# Patient Record
Sex: MALE | NOT HISPANIC OR LATINO | Employment: FULL TIME | URBAN - METROPOLITAN AREA
[De-identification: names, ages, dates, MRNs, and addresses within clinical notes are randomized per-mention and may not be internally consistent; named-entity substitution may affect disease eponyms.]

---

## 2020-01-22 ENCOUNTER — CONSULT (OUTPATIENT)
Dept: CARDIOLOGY | Facility: CLINIC | Age: 55
End: 2020-01-22

## 2020-01-22 VITALS
DIASTOLIC BLOOD PRESSURE: 83 MMHG | HEIGHT: 68 IN | SYSTOLIC BLOOD PRESSURE: 126 MMHG | HEART RATE: 89 BPM | BODY MASS INDEX: 36.37 KG/M2 | WEIGHT: 240 LBS

## 2020-01-22 DIAGNOSIS — R93.1 ABNORMAL ECHOCARDIOGRAM: Primary | ICD-10-CM

## 2020-01-22 DIAGNOSIS — I10 ESSENTIAL HYPERTENSION: ICD-10-CM

## 2020-01-22 DIAGNOSIS — R07.2 PRECORDIAL PAIN: ICD-10-CM

## 2020-01-22 DIAGNOSIS — I42.8 OTHER CARDIOMYOPATHY (HCC): ICD-10-CM

## 2020-01-22 DIAGNOSIS — R00.2 PALPITATIONS: ICD-10-CM

## 2020-01-22 PROCEDURE — 99204 OFFICE O/P NEW MOD 45 MIN: CPT | Performed by: INTERNAL MEDICINE

## 2020-01-22 PROCEDURE — 93000 ELECTROCARDIOGRAM COMPLETE: CPT | Performed by: INTERNAL MEDICINE

## 2020-01-22 RX ORDER — VARENICLINE TARTRATE 1 MG/1
1 TABLET, FILM COATED ORAL 2 TIMES DAILY
COMMUNITY

## 2020-01-22 RX ORDER — METOPROLOL SUCCINATE 50 MG/1
50 TABLET, EXTENDED RELEASE ORAL DAILY
COMMUNITY
End: 2020-02-11 | Stop reason: HOSPADM

## 2020-01-22 NOTE — PROGRESS NOTES
Subjective:     Encounter Date:01/22/2020    Primary Care Physician: Saul Cowan MD      Patient ID: Ross Quintero is a 54 y.o. male.    Chief Complaint:Shortness of Breath (consult)    PROBLEM LIST:  1. Cardiomyopathy/systolic congestive heart failure  a. 1/15/2020 echocardiogram EF 15%.  Mild to moderate mitral regurgitation.  RVSP 45 mmHg.  Biatrial enlargement.  Dilated right ventricle with decreased right ventricular systolic function.  2. Hypertension  3. Dyslipidemia  4. ETOH abuse  5. Tobacco abuse  6. Surgeries:  a. Lasik surgery  b. Vasectomy         No Known Allergies      Current Outpatient Medications:   •  metoprolol succinate XL (TOPROL-XL) 50 MG 24 hr tablet, Take 50 mg by mouth Daily., Disp: , Rfl:   •  varenicline (CHANTIX) 1 MG tablet, Take 1 mg by mouth 2 (Two) Times a Day., Disp: , Rfl:         History of Present Illness    Patient is a 54-year-old  male who we are seeing today for further evaluation of heart failure and abnormal echocardiogram with decreased LVEF of 15%.  He has no previous history of coronary disease.  Patient notes within the last month he has had increased shortness of breath.  Within the last week or so he began to experience waking up short of breath and noted orthopnea.  He also has some occasional chest discomfort which he just describes as an odd sensation.  Describes some intermittent palpitations.  Denies any syncope, or edema.  Started Chantix recently and is a currently attempting smoking cessation.  Has previous history of EtOH abuse but notes no significant intake since November per his report.  Chest discomfort with exertion walking in from parking lot to work    The following portions of the patient's history were reviewed and updated as appropriate: allergies, current medications, past family history, past medical history, past social history, past surgical history and problem list.    Family History   Adopted: Yes       Social History  "    Tobacco Use   • Smoking status: Current Every Day Smoker     Packs/day: 1.00     Years: 30.00     Pack years: 30.00   Substance Use Topics   • Alcohol use: Not Currently   • Drug use: Never         Review of Systems   Constitution: Negative for fever and malaise/fatigue.   HENT: Negative for nosebleeds.    Eyes: Negative for redness and visual disturbance.   Cardiovascular: Positive for chest pain, orthopnea and palpitations. Negative for paroxysmal nocturnal dyspnea.   Respiratory: Positive for cough and shortness of breath. Negative for snoring, sputum production and wheezing.    Hematologic/Lymphatic: Negative for bleeding problem.   Skin: Negative for flushing, itching and rash.   Musculoskeletal: Negative for falls, joint pain and muscle cramps.   Gastrointestinal: Positive for heartburn. Negative for abdominal pain, diarrhea, nausea and vomiting.   Genitourinary: Negative for hematuria.   Neurological: Positive for excessive daytime sleepiness and headaches. Negative for dizziness, tremors and weakness.   Psychiatric/Behavioral: Negative for substance abuse. The patient is not nervous/anxious.           Objective:   /83 (BP Location: Right arm, Patient Position: Sitting)   Pulse 89   Ht 172.7 cm (68\")   Wt 109 kg (240 lb)   BMI 36.49 kg/m²         Physical Exam   Constitutional: He is oriented to person, place, and time. He appears well-developed and well-nourished.   HENT:   Head: Normocephalic and atraumatic.   Mouth/Throat: Oropharynx is clear and moist.   Eyes: Pupils are equal, round, and reactive to light. Conjunctivae are normal.   Neck: Normal carotid pulses and no JVD present. Carotid bruit is not present. No thyromegaly present.   Cardiovascular: Normal rate, regular rhythm, S1 normal and S2 normal. Exam reveals no gallop and no friction rub.   No murmur heard.  Pulses:       Carotid pulses are 2+ on the right side, and 2+ on the left side.       Dorsalis pedis pulses are 2+ on the " right side, and 2+ on the left side.        Posterior tibial pulses are 2+ on the right side, and 2+ on the left side.   Pulmonary/Chest: No respiratory distress. He has no wheezes. He has no rales. He exhibits no tenderness.   Abdominal: He exhibits no distension, no abdominal bruit and no mass. There is no hepatosplenomegaly. There is no tenderness. There is no rebound.   Musculoskeletal: He exhibits no edema, tenderness or deformity.   Lymphadenopathy:     He has no cervical adenopathy.   Neurological: He is alert and oriented to person, place, and time. He has normal strength.   Skin: Skin is warm and dry. No rash noted. No cyanosis. Nails show no clubbing.   Psychiatric: He has a normal mood and affect. Cognition and memory are normal.         ECG 12 Lead  Date/Time: 1/22/2020 3:13 PM  Performed by: Darinel Martinez MD  Authorized by: Darinel Martinez MD   Comparison: compared with previous ECG from 1/13/2020  Similar to previous ECG  Comparison to previous ECG: Possible left atrial enlargement noted. Otherwise no significant change noted  Rhythm: sinus rhythm  Ectopy: unifocal PVCs  Other findings: non-specific ST-T wave changes    Clinical impression: abnormal EKG                  Assessment:   Assessment/Plan      Ross was seen today for shortness of breath.    Diagnoses and all orders for this visit:    Abnormal echocardiogram  -     Case Request Cath Lab: Left Heart Cath  -     ECG 12 Lead    Other cardiomyopathy (CMS/HCC)    Precordial pain    Essential hypertension    Palpitations      1.  Cardiomyopathy, with functional class III heart failure symptoms.  New onset.  2.  Exertional chest discomfort possible angina  3.  Hypertension currently controlled  4.  Dyslipidemia last LDL of 154  5.  Tobacco abuse, patient in the process of quitting.  Start Chantix next week with target date of quitting next week.  6.  Palpitations consistent with PVCs, as noted on EKG.    Recommendations 1.  Long discussion  regarding cardiomyopathy and need to institute medical therapy to improve LVEF.  2.  We will start Entresto 24/26 at this time.  3.  Has malaise fatigue with Toprol.  Once we have stabilized his medical regimen we can switch him to Coreg.  4.  Left heart catheterization as soon as possible to rule out ischemia given his chest discomfort multiple cardiac risk factors.  5.  May institute statin at the time of cath pending the results.  Also after evaluation of renal failure, we may start diuretics.       Radha EDWARDS scribed portions of this dictation for Dr. Darinel Martinez.    Dictated utilizing Dragon dictation

## 2020-01-23 PROBLEM — R93.1 ABNORMAL ECHOCARDIOGRAM: Status: ACTIVE | Noted: 2020-01-23

## 2020-01-30 ENCOUNTER — HOSPITAL ENCOUNTER (OUTPATIENT)
Facility: HOSPITAL | Age: 55
Setting detail: HOSPITAL OUTPATIENT SURGERY
Discharge: HOME | End: 2020-01-30
Attending: INTERNAL MEDICINE | Admitting: INTERNAL MEDICINE

## 2020-01-30 VITALS
SYSTOLIC BLOOD PRESSURE: 101 MMHG | WEIGHT: 236.25 LBS | DIASTOLIC BLOOD PRESSURE: 63 MMHG | TEMPERATURE: 96.8 F | RESPIRATION RATE: 16 BRPM | HEART RATE: 71 BPM | BODY MASS INDEX: 35.8 KG/M2 | OXYGEN SATURATION: 94 % | HEIGHT: 68 IN

## 2020-01-30 DIAGNOSIS — R93.1 ABNORMAL ECHOCARDIOGRAM: ICD-10-CM

## 2020-01-30 LAB
ALBUMIN SERPL-MCNC: 4.2 G/DL (ref 3.5–5.2)
ALBUMIN/GLOB SERPL: 1.1 G/DL
ALP SERPL-CCNC: 83 U/L (ref 39–117)
ALT SERPL W P-5'-P-CCNC: 24 U/L (ref 1–41)
ANION GAP SERPL CALCULATED.3IONS-SCNC: 13 MMOL/L (ref 5–15)
AST SERPL-CCNC: 23 U/L (ref 1–40)
BILIRUB SERPL-MCNC: 0.8 MG/DL (ref 0.2–1.2)
BUN BLD-MCNC: 16 MG/DL (ref 6–20)
BUN/CREAT SERPL: 18 (ref 7–25)
CALCIUM SPEC-SCNC: 10.1 MG/DL (ref 8.6–10.5)
CHLORIDE SERPL-SCNC: 102 MMOL/L (ref 98–107)
CHOLEST SERPL-MCNC: 210 MG/DL (ref 0–200)
CO2 SERPL-SCNC: 24 MMOL/L (ref 22–29)
CREAT BLD-MCNC: 0.89 MG/DL (ref 0.76–1.27)
DEPRECATED RDW RBC AUTO: 52.2 FL (ref 37–54)
ERYTHROCYTE [DISTWIDTH] IN BLOOD BY AUTOMATED COUNT: 14.6 % (ref 12.3–15.4)
GFR SERPL CREATININE-BSD FRML MDRD: 109 ML/MIN/1.73
GFR SERPL CREATININE-BSD FRML MDRD: 90 ML/MIN/1.73
GLOBULIN UR ELPH-MCNC: 3.9 GM/DL
GLUCOSE BLD-MCNC: 113 MG/DL (ref 65–99)
HBA1C MFR BLD: 6.4 % (ref 4.8–5.6)
HCT VFR BLD AUTO: 48.7 % (ref 37.5–51)
HDLC SERPL-MCNC: 42 MG/DL (ref 40–60)
HGB BLD-MCNC: 15.9 G/DL (ref 13–17.7)
LDLC SERPL CALC-MCNC: 149 MG/DL (ref 0–100)
LDLC/HDLC SERPL: 3.54 {RATIO}
MCH RBC QN AUTO: 31.2 PG (ref 26.6–33)
MCHC RBC AUTO-ENTMCNC: 32.6 G/DL (ref 31.5–35.7)
MCV RBC AUTO: 95.5 FL (ref 79–97)
PLATELET # BLD AUTO: 235 10*3/MM3 (ref 140–450)
PMV BLD AUTO: 11.1 FL (ref 6–12)
POTASSIUM BLD-SCNC: 4.5 MMOL/L (ref 3.5–5.2)
PROT SERPL-MCNC: 8.1 G/DL (ref 6–8.5)
RBC # BLD AUTO: 5.1 10*6/MM3 (ref 4.14–5.8)
SODIUM BLD-SCNC: 139 MMOL/L (ref 136–145)
TRIGL SERPL-MCNC: 96 MG/DL (ref 0–150)
VLDLC SERPL-MCNC: 19.2 MG/DL
WBC NRBC COR # BLD: 7.09 10*3/MM3 (ref 3.4–10.8)

## 2020-01-30 PROCEDURE — C1887 CATHETER, GUIDING: HCPCS | Performed by: INTERNAL MEDICINE

## 2020-01-30 PROCEDURE — 80061 LIPID PANEL: CPT | Performed by: NURSE PRACTITIONER

## 2020-01-30 PROCEDURE — C1769 GUIDE WIRE: HCPCS | Performed by: INTERNAL MEDICINE

## 2020-01-30 PROCEDURE — 25010000002 BIVALIRUDIN TRIFLUOROACETATE 250 MG RECONSTITUTED SOLUTION 1 EACH VIAL: Performed by: INTERNAL MEDICINE

## 2020-01-30 PROCEDURE — 80053 COMPREHEN METABOLIC PANEL: CPT | Performed by: NURSE PRACTITIONER

## 2020-01-30 PROCEDURE — 99244 OFF/OP CNSLTJ NEW/EST MOD 40: CPT | Performed by: THORACIC SURGERY (CARDIOTHORACIC VASCULAR SURGERY)

## 2020-01-30 PROCEDURE — 93458 L HRT ARTERY/VENTRICLE ANGIO: CPT | Performed by: INTERNAL MEDICINE

## 2020-01-30 PROCEDURE — 25010000002 HEPARIN (PORCINE) PER 1000 UNITS: Performed by: INTERNAL MEDICINE

## 2020-01-30 PROCEDURE — 25010000002 MIDAZOLAM PER 1 MG: Performed by: INTERNAL MEDICINE

## 2020-01-30 PROCEDURE — 93571 IV DOP VEL&/PRESS C FLO 1ST: CPT | Performed by: INTERNAL MEDICINE

## 2020-01-30 PROCEDURE — C1894 INTRO/SHEATH, NON-LASER: HCPCS | Performed by: INTERNAL MEDICINE

## 2020-01-30 PROCEDURE — 93572 IV DOP VEL&/PRESS C FLO EA: CPT | Performed by: INTERNAL MEDICINE

## 2020-01-30 PROCEDURE — 83036 HEMOGLOBIN GLYCOSYLATED A1C: CPT | Performed by: NURSE PRACTITIONER

## 2020-01-30 PROCEDURE — 85027 COMPLETE CBC AUTOMATED: CPT | Performed by: NURSE PRACTITIONER

## 2020-01-30 PROCEDURE — 36415 COLL VENOUS BLD VENIPUNCTURE: CPT

## 2020-01-30 PROCEDURE — 0 IOPAMIDOL PER 1 ML: Performed by: INTERNAL MEDICINE

## 2020-01-30 PROCEDURE — 25010000002 FENTANYL CITRATE (PF) 100 MCG/2ML SOLUTION: Performed by: INTERNAL MEDICINE

## 2020-01-30 RX ORDER — ACETAMINOPHEN 325 MG/1
650 TABLET ORAL EVERY 4 HOURS PRN
Status: DISCONTINUED | OUTPATIENT
Start: 2020-01-30 | End: 2020-01-30 | Stop reason: HOSPADM

## 2020-01-30 RX ORDER — ATORVASTATIN CALCIUM 40 MG/1
40 TABLET, FILM COATED ORAL DAILY
Qty: 30 TABLET | Refills: 11 | Status: SHIPPED | OUTPATIENT
Start: 2020-01-30 | End: 2020-04-07 | Stop reason: SDUPTHER

## 2020-01-30 RX ORDER — MIDAZOLAM HYDROCHLORIDE 1 MG/ML
INJECTION INTRAMUSCULAR; INTRAVENOUS AS NEEDED
Status: DISCONTINUED | OUTPATIENT
Start: 2020-01-30 | End: 2020-01-30 | Stop reason: HOSPADM

## 2020-01-30 RX ORDER — SODIUM CHLORIDE 9 MG/ML
100 INJECTION, SOLUTION INTRAVENOUS CONTINUOUS
Status: ACTIVE | OUTPATIENT
Start: 2020-01-30 | End: 2020-01-30

## 2020-01-30 RX ORDER — SODIUM CHLORIDE 0.9 % (FLUSH) 0.9 %
3 SYRINGE (ML) INJECTION EVERY 12 HOURS SCHEDULED
Status: DISCONTINUED | OUTPATIENT
Start: 2020-01-30 | End: 2020-01-30 | Stop reason: HOSPADM

## 2020-01-30 RX ORDER — ASPIRIN 325 MG
325 TABLET, DELAYED RELEASE (ENTERIC COATED) ORAL DAILY
Status: DISCONTINUED | OUTPATIENT
Start: 2020-01-31 | End: 2020-01-30 | Stop reason: HOSPADM

## 2020-01-30 RX ORDER — FEXOFENADINE HCL 180 MG/1
180 TABLET ORAL DAILY
COMMUNITY
End: 2020-03-03

## 2020-01-30 RX ORDER — LIDOCAINE HYDROCHLORIDE 10 MG/ML
INJECTION, SOLUTION EPIDURAL; INFILTRATION; INTRACAUDAL; PERINEURAL AS NEEDED
Status: DISCONTINUED | OUTPATIENT
Start: 2020-01-30 | End: 2020-01-30 | Stop reason: HOSPADM

## 2020-01-30 RX ORDER — NITROGLYCERIN 0.4 MG/1
0.4 TABLET SUBLINGUAL
Status: DISCONTINUED | OUTPATIENT
Start: 2020-01-30 | End: 2020-01-30 | Stop reason: HOSPADM

## 2020-01-30 RX ORDER — FENTANYL CITRATE 50 UG/ML
INJECTION, SOLUTION INTRAMUSCULAR; INTRAVENOUS AS NEEDED
Status: DISCONTINUED | OUTPATIENT
Start: 2020-01-30 | End: 2020-01-30 | Stop reason: HOSPADM

## 2020-01-30 RX ORDER — SODIUM CHLORIDE 0.9 % (FLUSH) 0.9 %
10 SYRINGE (ML) INJECTION AS NEEDED
Status: DISCONTINUED | OUTPATIENT
Start: 2020-01-30 | End: 2020-01-30 | Stop reason: HOSPADM

## 2020-01-30 RX ORDER — ONDANSETRON 2 MG/ML
4 INJECTION INTRAMUSCULAR; INTRAVENOUS EVERY 6 HOURS PRN
Status: DISCONTINUED | OUTPATIENT
Start: 2020-01-30 | End: 2020-01-30 | Stop reason: HOSPADM

## 2020-01-30 RX ORDER — ASPIRIN 325 MG
325 TABLET ORAL ONCE
Status: COMPLETED | OUTPATIENT
Start: 2020-01-30 | End: 2020-01-30

## 2020-01-30 RX ADMIN — ASPIRIN 325 MG ORAL TABLET 325 MG: 325 PILL ORAL at 10:54

## 2020-01-30 RX ADMIN — SODIUM CHLORIDE 100 ML/HR: 9 INJECTION, SOLUTION INTRAVENOUS at 10:27

## 2020-01-31 ENCOUNTER — PREP FOR SURGERY (OUTPATIENT)
Dept: OTHER | Facility: HOSPITAL | Age: 55
End: 2020-01-31

## 2020-01-31 DIAGNOSIS — I25.10 ATHEROSCLEROSIS OF NATIVE CORONARY ARTERY OF NATIVE HEART, ANGINA PRESENCE UNSPECIFIED: Primary | ICD-10-CM

## 2020-01-31 DIAGNOSIS — I20.8 ANGINA AT REST (HCC): Primary | ICD-10-CM

## 2020-01-31 PROBLEM — I20.89 ANGINA AT REST: Status: ACTIVE | Noted: 2020-01-31

## 2020-01-31 RX ORDER — ACETAMINOPHEN 325 MG/1
650 TABLET ORAL EVERY 4 HOURS PRN
Status: CANCELLED | OUTPATIENT
Start: 2020-02-05

## 2020-01-31 RX ORDER — ASPIRIN 325 MG
325 TABLET ORAL NIGHTLY
Status: CANCELLED | OUTPATIENT
Start: 2020-02-04 | End: 2020-02-05

## 2020-01-31 RX ORDER — CHLORHEXIDINE GLUCONATE 500 MG/1
1 CLOTH TOPICAL EVERY 12 HOURS PRN
Status: CANCELLED | OUTPATIENT
Start: 2020-02-04

## 2020-01-31 RX ORDER — CHLORHEXIDINE GLUCONATE 500 MG/1
1 CLOTH TOPICAL EVERY 12 HOURS PRN
Status: CANCELLED | OUTPATIENT
Start: 2020-02-05

## 2020-01-31 RX ORDER — NITROGLYCERIN 0.4 MG/1
0.4 TABLET SUBLINGUAL
Status: CANCELLED | OUTPATIENT
Start: 2020-02-05

## 2020-01-31 RX ORDER — CHLORHEXIDINE GLUCONATE 0.12 MG/ML
15 RINSE ORAL ONCE
Status: CANCELLED | OUTPATIENT
Start: 2020-02-05 | End: 2020-02-05

## 2020-02-04 ENCOUNTER — HOSPITAL ENCOUNTER (OUTPATIENT)
Dept: PULMONOLOGY | Facility: HOSPITAL | Age: 55
Discharge: HOME | End: 2020-02-04

## 2020-02-04 ENCOUNTER — ANESTHESIA EVENT (OUTPATIENT)
Dept: PERIOP | Facility: HOSPITAL | Age: 55
End: 2020-02-04

## 2020-02-04 ENCOUNTER — HOSPITAL ENCOUNTER (OUTPATIENT)
Dept: GENERAL RADIOLOGY | Facility: HOSPITAL | Age: 55
Discharge: HOME | End: 2020-02-04
Admitting: PHYSICIAN ASSISTANT

## 2020-02-04 ENCOUNTER — APPOINTMENT (OUTPATIENT)
Dept: PREADMISSION TESTING | Facility: HOSPITAL | Age: 55
End: 2020-02-04

## 2020-02-04 VITALS — WEIGHT: 236.99 LBS | OXYGEN SATURATION: 99 % | BODY MASS INDEX: 35.92 KG/M2 | HEIGHT: 68 IN

## 2020-02-04 DIAGNOSIS — I25.10 ATHEROSCLEROSIS OF NATIVE CORONARY ARTERY OF NATIVE HEART, ANGINA PRESENCE UNSPECIFIED: ICD-10-CM

## 2020-02-04 LAB
ABO GROUP BLD: NORMAL
ALBUMIN SERPL-MCNC: 4.3 G/DL (ref 3.5–5.2)
ALBUMIN/GLOB SERPL: 1.2 G/DL
ALP SERPL-CCNC: 83 U/L (ref 39–117)
ALT SERPL W P-5'-P-CCNC: 19 U/L (ref 1–41)
AMPHET+METHAMPHET UR QL: NEGATIVE
AMPHETAMINES UR QL: NEGATIVE
ANION GAP SERPL CALCULATED.3IONS-SCNC: 13 MMOL/L (ref 5–15)
APTT PPP: 32.1 SECONDS (ref 24–37)
AST SERPL-CCNC: 19 U/L (ref 1–40)
BARBITURATES UR QL SCN: NEGATIVE
BASOPHILS # BLD AUTO: 0.08 10*3/MM3 (ref 0–0.2)
BASOPHILS NFR BLD AUTO: 1.1 % (ref 0–1.5)
BENZODIAZ UR QL SCN: NEGATIVE
BILIRUB SERPL-MCNC: 0.7 MG/DL (ref 0.2–1.2)
BLD GP AB SCN SERPL QL: NEGATIVE
BUN BLD-MCNC: 12 MG/DL (ref 6–20)
BUN/CREAT SERPL: 14 (ref 7–25)
BUPRENORPHINE SERPL-MCNC: NEGATIVE NG/ML
CALCIUM SPEC-SCNC: 9.3 MG/DL (ref 8.6–10.5)
CANNABINOIDS SERPL QL: NEGATIVE
CHLORIDE SERPL-SCNC: 101 MMOL/L (ref 98–107)
CO2 SERPL-SCNC: 25 MMOL/L (ref 22–29)
COCAINE UR QL: NEGATIVE
CREAT BLD-MCNC: 0.86 MG/DL (ref 0.76–1.27)
DEPRECATED RDW RBC AUTO: 50.1 FL (ref 37–54)
EOSINOPHIL # BLD AUTO: 0.17 10*3/MM3 (ref 0–0.4)
EOSINOPHIL NFR BLD AUTO: 2.4 % (ref 0.3–6.2)
ERYTHROCYTE [DISTWIDTH] IN BLOOD BY AUTOMATED COUNT: 14.2 % (ref 12.3–15.4)
GFR SERPL CREATININE-BSD FRML MDRD: 113 ML/MIN/1.73
GFR SERPL CREATININE-BSD FRML MDRD: 93 ML/MIN/1.73
GLOBULIN UR ELPH-MCNC: 3.5 GM/DL
GLUCOSE BLD-MCNC: 84 MG/DL (ref 65–99)
HCT VFR BLD AUTO: 45.1 % (ref 37.5–51)
HGB BLD-MCNC: 15.1 G/DL (ref 13–17.7)
IMM GRANULOCYTES # BLD AUTO: 0.01 10*3/MM3 (ref 0–0.05)
IMM GRANULOCYTES NFR BLD AUTO: 0.1 % (ref 0–0.5)
INR PPP: 1.09 (ref 0.85–1.16)
LYMPHOCYTES # BLD AUTO: 3.63 10*3/MM3 (ref 0.7–3.1)
LYMPHOCYTES NFR BLD AUTO: 51.6 % (ref 19.6–45.3)
MAGNESIUM SERPL-MCNC: 2 MG/DL (ref 1.6–2.6)
MCH RBC QN AUTO: 31.9 PG (ref 26.6–33)
MCHC RBC AUTO-ENTMCNC: 33.5 G/DL (ref 31.5–35.7)
MCV RBC AUTO: 95.1 FL (ref 79–97)
METHADONE UR QL SCN: NEGATIVE
MONOCYTES # BLD AUTO: 0.67 10*3/MM3 (ref 0.1–0.9)
MONOCYTES NFR BLD AUTO: 9.5 % (ref 5–12)
NEUTROPHILS # BLD AUTO: 2.48 10*3/MM3 (ref 1.7–7)
NEUTROPHILS NFR BLD AUTO: 35.3 % (ref 42.7–76)
NRBC BLD AUTO-RTO: 0 /100 WBC (ref 0–0.2)
OPIATES UR QL: NEGATIVE
OXYCODONE UR QL SCN: NEGATIVE
PA ADP PRP-ACNC: 180 PRU
PCP UR QL SCN: NEGATIVE
PLATELET # BLD AUTO: 230 10*3/MM3 (ref 140–450)
PMV BLD AUTO: 10.6 FL (ref 6–12)
POTASSIUM BLD-SCNC: 4.2 MMOL/L (ref 3.5–5.2)
PROPOXYPH UR QL: NEGATIVE
PROT SERPL-MCNC: 7.8 G/DL (ref 6–8.5)
PROTHROMBIN TIME: 13.6 SECONDS (ref 11.2–14.3)
RBC # BLD AUTO: 4.74 10*6/MM3 (ref 4.14–5.8)
RH BLD: POSITIVE
SODIUM BLD-SCNC: 139 MMOL/L (ref 136–145)
T&S EXPIRATION DATE: NORMAL
TRICYCLICS UR QL SCN: NEGATIVE
WBC NRBC COR # BLD: 7.04 10*3/MM3 (ref 3.4–10.8)

## 2020-02-04 PROCEDURE — 36415 COLL VENOUS BLD VENIPUNCTURE: CPT

## 2020-02-04 PROCEDURE — 85730 THROMBOPLASTIN TIME PARTIAL: CPT | Performed by: PHYSICIAN ASSISTANT

## 2020-02-04 PROCEDURE — 94010 BREATHING CAPACITY TEST: CPT | Performed by: INTERNAL MEDICINE

## 2020-02-04 PROCEDURE — 80306 DRUG TEST PRSMV INSTRMNT: CPT | Performed by: PHYSICIAN ASSISTANT

## 2020-02-04 PROCEDURE — 86923 COMPATIBILITY TEST ELECTRIC: CPT

## 2020-02-04 PROCEDURE — 85576 BLOOD PLATELET AGGREGATION: CPT | Performed by: PHYSICIAN ASSISTANT

## 2020-02-04 PROCEDURE — 83735 ASSAY OF MAGNESIUM: CPT | Performed by: PHYSICIAN ASSISTANT

## 2020-02-04 PROCEDURE — 71046 X-RAY EXAM CHEST 2 VIEWS: CPT

## 2020-02-04 PROCEDURE — 86901 BLOOD TYPING SEROLOGIC RH(D): CPT | Performed by: PHYSICIAN ASSISTANT

## 2020-02-04 PROCEDURE — 86850 RBC ANTIBODY SCREEN: CPT | Performed by: PHYSICIAN ASSISTANT

## 2020-02-04 PROCEDURE — 85610 PROTHROMBIN TIME: CPT | Performed by: PHYSICIAN ASSISTANT

## 2020-02-04 PROCEDURE — 86900 BLOOD TYPING SEROLOGIC ABO: CPT | Performed by: PHYSICIAN ASSISTANT

## 2020-02-04 PROCEDURE — 94010 BREATHING CAPACITY TEST: CPT

## 2020-02-04 PROCEDURE — 80053 COMPREHEN METABOLIC PANEL: CPT | Performed by: PHYSICIAN ASSISTANT

## 2020-02-04 PROCEDURE — 85025 COMPLETE CBC W/AUTO DIFF WBC: CPT | Performed by: PHYSICIAN ASSISTANT

## 2020-02-04 RX ORDER — CETIRIZINE HYDROCHLORIDE 10 MG/1
10 TABLET ORAL DAILY
COMMUNITY
End: 2020-03-03

## 2020-02-04 RX ORDER — CHLORHEXIDINE GLUCONATE 500 MG/1
1 CLOTH TOPICAL EVERY 12 HOURS PRN
Status: DISCONTINUED | OUTPATIENT
Start: 2020-02-04 | End: 2020-02-06

## 2020-02-04 RX ORDER — SODIUM CHLORIDE 0.9 % (FLUSH) 0.9 %
10 SYRINGE (ML) INJECTION AS NEEDED
Status: CANCELLED | OUTPATIENT
Start: 2020-02-04

## 2020-02-04 RX ORDER — SODIUM CHLORIDE 0.9 % (FLUSH) 0.9 %
10 SYRINGE (ML) INJECTION EVERY 12 HOURS SCHEDULED
Status: CANCELLED | OUTPATIENT
Start: 2020-02-04

## 2020-02-04 RX ORDER — ASPIRIN 325 MG
325 TABLET ORAL NIGHTLY
Status: SHIPPED | OUTPATIENT
Start: 2020-02-04 | End: 2020-02-05

## 2020-02-04 NOTE — PAT
Patient to apply Chlorhexadine wipes  to surgical area (as instructed) the night before procedure and the AM of procedure. Wipes provided.    Patient/family provided a [facility] Heart Surgery book (if not already provided by the CT surgeon's office).  Encouraged patient and family to read the Heart Surgery book if they had not already done so.  Also completed Heart Surgery pre surgery checklist with patient.  Verified with patient that exercise handout was received, if not, then one was provided during PAT visit.      Education during PAT visit included general perioperative instructions that are routine for all surgical patients (PAT PASS, wipes, directions to pre-op,e tc.).  Additionally, a handout was provided and discussed that stressed the importance of Honesty, Incentive Spirometry use, Ambulation, and Pain control.  This handout also explained the tubes in place during immediate post op recovery.  Verbal instructions given as well.     Patient and/or family verbalized understanding of education and reinforcement was provided as needed.    Instructed patient to take 325 mg the night before heart surgery as per CT surgeon's order.  Patient and/or family verbalized understanding.    Pt had chest x-ray just prior to PAT visit.    EKG done on 1-    A1C  Done 1-    O2 sats 99% on room air.     PFT done.

## 2020-02-05 ENCOUNTER — ANESTHESIA (OUTPATIENT)
Dept: PERIOP | Facility: HOSPITAL | Age: 55
End: 2020-02-05

## 2020-02-05 ENCOUNTER — HOSPITAL ENCOUNTER (INPATIENT)
Facility: HOSPITAL | Age: 55
LOS: 6 days | Discharge: HOME | End: 2020-02-11
Attending: THORACIC SURGERY (CARDIOTHORACIC VASCULAR SURGERY) | Admitting: THORACIC SURGERY (CARDIOTHORACIC VASCULAR SURGERY)

## 2020-02-05 ENCOUNTER — APPOINTMENT (OUTPATIENT)
Dept: GENERAL RADIOLOGY | Facility: HOSPITAL | Age: 55
End: 2020-02-05

## 2020-02-05 DIAGNOSIS — I25.10 ATHEROSCLEROSIS OF NATIVE CORONARY ARTERY OF NATIVE HEART, ANGINA PRESENCE UNSPECIFIED: ICD-10-CM

## 2020-02-05 PROBLEM — I10 HTN (HYPERTENSION): Status: ACTIVE | Noted: 2020-02-05

## 2020-02-05 PROBLEM — I50.22 CHRONIC SYSTOLIC CHF (CONGESTIVE HEART FAILURE) (HCC): Status: ACTIVE | Noted: 2020-02-05

## 2020-02-05 LAB
ABO GROUP BLD: NORMAL
ACT BLD: 120 SECONDS (ref 82–152)
ACT BLD: 120 SECONDS (ref 82–152)
ACT BLD: 494 SECONDS (ref 82–152)
ACT BLD: 543 SECONDS (ref 82–152)
ACT BLD: 543 SECONDS (ref 82–152)
ACT BLD: 555 SECONDS (ref 82–152)
ALBUMIN SERPL-MCNC: 3.7 G/DL (ref 3.5–5.2)
ALBUMIN SERPL-MCNC: 4.2 G/DL (ref 3.5–5.2)
ALBUMIN SERPL-MCNC: 4.3 G/DL (ref 3.5–5.2)
ANION GAP SERPL CALCULATED.3IONS-SCNC: 10 MMOL/L (ref 5–15)
ANION GAP SERPL CALCULATED.3IONS-SCNC: 10 MMOL/L (ref 5–15)
ANION GAP SERPL CALCULATED.3IONS-SCNC: 13 MMOL/L (ref 5–15)
APTT PPP: 31.1 SECONDS (ref 24–37)
ARTERIAL PATENCY WRIST A: ABNORMAL
ARTERIAL PATENCY WRIST A: ABNORMAL
ATMOSPHERIC PRESS: ABNORMAL MM[HG]
ATMOSPHERIC PRESS: ABNORMAL MM[HG]
BASE EXCESS BLDA CALC-SCNC: -0.2 MMOL/L (ref 0–2)
BASE EXCESS BLDA CALC-SCNC: -1 MMOL/L (ref -5–5)
BASE EXCESS BLDA CALC-SCNC: -2 MMOL/L (ref -5–5)
BASE EXCESS BLDA CALC-SCNC: 0 MMOL/L (ref -5–5)
BASE EXCESS BLDA CALC-SCNC: 0.5 MMOL/L (ref 0–2)
BASE EXCESS BLDA CALC-SCNC: 2 MMOL/L (ref -5–5)
BASE EXCESS BLDA CALC-SCNC: 3 MMOL/L (ref -5–5)
BASE EXCESS BLDA CALC-SCNC: 3 MMOL/L (ref -5–5)
BDY SITE: ABNORMAL
BDY SITE: ABNORMAL
BODY TEMPERATURE: 37 C
BODY TEMPERATURE: 37 C
BUN BLD-MCNC: 12 MG/DL (ref 6–20)
BUN BLD-MCNC: 13 MG/DL (ref 6–20)
BUN BLD-MCNC: 13 MG/DL (ref 6–20)
BUN/CREAT SERPL: 13.6 (ref 7–25)
BUN/CREAT SERPL: 14 (ref 7–25)
BUN/CREAT SERPL: 16.7 (ref 7–25)
CA-I BLDA-SCNC: 0.93 MMOL/L (ref 1.2–1.32)
CA-I BLDA-SCNC: 0.95 MMOL/L (ref 1.2–1.32)
CA-I BLDA-SCNC: 0.98 MMOL/L (ref 1.2–1.32)
CA-I BLDA-SCNC: 0.99 MMOL/L (ref 1.2–1.32)
CA-I BLDA-SCNC: 1.12 MMOL/L (ref 1.2–1.32)
CA-I BLDA-SCNC: 1.2 MMOL/L (ref 1.2–1.32)
CA-I SERPL ISE-MCNC: 1.21 MMOL/L (ref 1.12–1.32)
CALCIUM SPEC-SCNC: 8.3 MG/DL (ref 8.6–10.5)
CALCIUM SPEC-SCNC: 8.3 MG/DL (ref 8.6–10.5)
CALCIUM SPEC-SCNC: 8.5 MG/DL (ref 8.6–10.5)
CHLORIDE SERPL-SCNC: 103 MMOL/L (ref 98–107)
CHLORIDE SERPL-SCNC: 108 MMOL/L (ref 98–107)
CHLORIDE SERPL-SCNC: 108 MMOL/L (ref 98–107)
CO2 BLDA-SCNC: 23 MMOL/L (ref 24–29)
CO2 BLDA-SCNC: 25 MMOL/L (ref 24–29)
CO2 BLDA-SCNC: 27 MMOL/L (ref 22–33)
CO2 BLDA-SCNC: 27 MMOL/L (ref 24–29)
CO2 BLDA-SCNC: 28 MMOL/L (ref 24–29)
CO2 BLDA-SCNC: 29 MMOL/L (ref 24–29)
CO2 BLDA-SCNC: 29 MMOL/L (ref 24–29)
CO2 BLDA-SCNC: 29.1 MMOL/L (ref 22–33)
CO2 SERPL-SCNC: 23 MMOL/L (ref 22–29)
CO2 SERPL-SCNC: 24 MMOL/L (ref 22–29)
CO2 SERPL-SCNC: 25 MMOL/L (ref 22–29)
COHGB MFR BLD: 0.5 % (ref 0–2)
COHGB MFR BLD: 0.5 % (ref 0–2)
CPAP: 5 CMH2O
CREAT BLD-MCNC: 0.78 MG/DL (ref 0.76–1.27)
CREAT BLD-MCNC: 0.88 MG/DL (ref 0.76–1.27)
CREAT BLD-MCNC: 0.93 MG/DL (ref 0.76–1.27)
DEPRECATED RDW RBC AUTO: 48 FL (ref 37–54)
DEPRECATED RDW RBC AUTO: 49 FL (ref 37–54)
DEPRECATED RDW RBC AUTO: 49.5 FL (ref 37–54)
ERYTHROCYTE [DISTWIDTH] IN BLOOD BY AUTOMATED COUNT: 14.2 % (ref 12.3–15.4)
ERYTHROCYTE [DISTWIDTH] IN BLOOD BY AUTOMATED COUNT: 14.3 % (ref 12.3–15.4)
ERYTHROCYTE [DISTWIDTH] IN BLOOD BY AUTOMATED COUNT: 14.4 % (ref 12.3–15.4)
GFR SERPL CREATININE-BSD FRML MDRD: 103 ML/MIN/1.73
GFR SERPL CREATININE-BSD FRML MDRD: 105 ML/MIN/1.73
GFR SERPL CREATININE-BSD FRML MDRD: 110 ML/MIN/1.73
GFR SERPL CREATININE-BSD FRML MDRD: 127 ML/MIN/1.73
GFR SERPL CREATININE-BSD FRML MDRD: 85 ML/MIN/1.73
GFR SERPL CREATININE-BSD FRML MDRD: 91 ML/MIN/1.73
GLUCOSE BLD-MCNC: 105 MG/DL (ref 65–99)
GLUCOSE BLD-MCNC: 140 MG/DL (ref 65–99)
GLUCOSE BLD-MCNC: 188 MG/DL (ref 65–99)
GLUCOSE BLDC GLUCOMTR-MCNC: 112 MG/DL (ref 70–130)
GLUCOSE BLDC GLUCOMTR-MCNC: 120 MG/DL (ref 70–130)
GLUCOSE BLDC GLUCOMTR-MCNC: 124 MG/DL (ref 70–130)
GLUCOSE BLDC GLUCOMTR-MCNC: 125 MG/DL (ref 70–130)
GLUCOSE BLDC GLUCOMTR-MCNC: 130 MG/DL (ref 70–130)
GLUCOSE BLDC GLUCOMTR-MCNC: 134 MG/DL (ref 70–130)
GLUCOSE BLDC GLUCOMTR-MCNC: 145 MG/DL (ref 70–130)
GLUCOSE BLDC GLUCOMTR-MCNC: 155 MG/DL (ref 70–130)
GLUCOSE BLDC GLUCOMTR-MCNC: 158 MG/DL (ref 70–130)
GLUCOSE BLDC GLUCOMTR-MCNC: 168 MG/DL (ref 70–130)
GLUCOSE BLDC GLUCOMTR-MCNC: 178 MG/DL (ref 70–130)
GLUCOSE BLDC GLUCOMTR-MCNC: 93 MG/DL (ref 70–130)
HCO3 BLDA-SCNC: 22.3 MMOL/L (ref 22–26)
HCO3 BLDA-SCNC: 23.8 MMOL/L (ref 22–26)
HCO3 BLDA-SCNC: 25.5 MMOL/L (ref 22–26)
HCO3 BLDA-SCNC: 25.6 MMOL/L (ref 20–26)
HCO3 BLDA-SCNC: 26.7 MMOL/L (ref 22–26)
HCO3 BLDA-SCNC: 27.3 MMOL/L (ref 22–26)
HCO3 BLDA-SCNC: 27.4 MMOL/L (ref 20–26)
HCO3 BLDA-SCNC: 27.6 MMOL/L (ref 22–26)
HCT VFR BLD AUTO: 33 % (ref 37.5–51)
HCT VFR BLD AUTO: 35.1 % (ref 37.5–51)
HCT VFR BLD AUTO: 35.2 % (ref 37.5–51)
HCT VFR BLD CALC: 37.3 %
HCT VFR BLD CALC: 39.2 %
HCT VFR BLDA CALC: 26 % (ref 38–51)
HCT VFR BLDA CALC: 29 % (ref 38–51)
HCT VFR BLDA CALC: 29 % (ref 38–51)
HCT VFR BLDA CALC: 30 % (ref 38–51)
HCT VFR BLDA CALC: 37 % (ref 38–51)
HCT VFR BLDA CALC: 40 % (ref 38–51)
HGB BLD-MCNC: 11.1 G/DL (ref 13–17.7)
HGB BLD-MCNC: 12 G/DL (ref 13–17.7)
HGB BLD-MCNC: 12.1 G/DL (ref 13–17.7)
HGB BLDA-MCNC: 10.2 G/DL (ref 12–17)
HGB BLDA-MCNC: 12.2 G/DL (ref 13.5–17.5)
HGB BLDA-MCNC: 12.6 G/DL (ref 12–17)
HGB BLDA-MCNC: 12.8 G/DL (ref 13.5–17.5)
HGB BLDA-MCNC: 13.6 G/DL (ref 12–17)
HGB BLDA-MCNC: 8.8 G/DL (ref 12–17)
HGB BLDA-MCNC: 9.9 G/DL (ref 12–17)
HGB BLDA-MCNC: 9.9 G/DL (ref 12–17)
HOROWITZ INDEX BLD+IHG-RTO: 100 %
HOROWITZ INDEX BLD+IHG-RTO: 40 %
INR PPP: 1.47 (ref 0.85–1.16)
MAGNESIUM SERPL-MCNC: 2.3 MG/DL (ref 1.6–2.6)
MAGNESIUM SERPL-MCNC: 2.6 MG/DL (ref 1.6–2.6)
MAGNESIUM SERPL-MCNC: 2.9 MG/DL (ref 1.6–2.6)
MCH RBC QN AUTO: 32 PG (ref 26.6–33)
MCH RBC QN AUTO: 32.1 PG (ref 26.6–33)
MCH RBC QN AUTO: 32.2 PG (ref 26.6–33)
MCHC RBC AUTO-ENTMCNC: 33.6 G/DL (ref 31.5–35.7)
MCHC RBC AUTO-ENTMCNC: 34.1 G/DL (ref 31.5–35.7)
MCHC RBC AUTO-ENTMCNC: 34.5 G/DL (ref 31.5–35.7)
MCV RBC AUTO: 92.9 FL (ref 79–97)
MCV RBC AUTO: 94.4 FL (ref 79–97)
MCV RBC AUTO: 95.4 FL (ref 79–97)
METHGB BLD QL: 1.1 % (ref 0–1.5)
METHGB BLD QL: 1.3 % (ref 0–1.5)
MODALITY: ABNORMAL
MODALITY: ABNORMAL
NOTE: ABNORMAL
NOTE: ABNORMAL
OXYHGB MFR BLDV: 92.9 % (ref 94–99)
OXYHGB MFR BLDV: 97 % (ref 94–99)
PCO2 BLDA: 34.2 MM HG (ref 35–45)
PCO2 BLDA: 38.3 MM HG (ref 35–45)
PCO2 BLDA: 40.3 MM HG (ref 35–45)
PCO2 BLDA: 41.3 MM HG (ref 35–45)
PCO2 BLDA: 43.2 MM HG (ref 35–45)
PCO2 BLDA: 43.7 MM HG (ref 35–45)
PCO2 BLDA: 45.6 MM HG
PCO2 BLDA: 53.3 MM HG
PCO2 TEMP ADJ BLD: 45.6 MM HG (ref 35–48)
PCO2 TEMP ADJ BLD: 53.3 MM HG (ref 35–48)
PEEP RESPIRATORY: 10 CM[H2O]
PH BLDA: 7.32 PH UNITS (ref 7.35–7.45)
PH BLDA: 7.36 PH UNITS (ref 7.35–7.45)
PH BLDA: 7.38 PH UNITS (ref 7.35–7.6)
PH BLDA: 7.4 PH UNITS (ref 7.35–7.6)
PH BLDA: 7.41 PH UNITS (ref 7.35–7.6)
PH BLDA: 7.42 PH UNITS (ref 7.35–7.6)
PH BLDA: 7.43 PH UNITS (ref 7.35–7.6)
PH BLDA: 7.43 PH UNITS (ref 7.35–7.6)
PH, TEMP CORRECTED: 7.32 PH UNITS
PH, TEMP CORRECTED: 7.36 PH UNITS
PHOSPHATE SERPL-MCNC: 2.2 MG/DL (ref 2.5–4.5)
PHOSPHATE SERPL-MCNC: 3.1 MG/DL (ref 2.5–4.5)
PHOSPHATE SERPL-MCNC: 3.9 MG/DL (ref 2.5–4.5)
PLATELET # BLD AUTO: 133 10*3/MM3 (ref 140–450)
PLATELET # BLD AUTO: 138 10*3/MM3 (ref 140–450)
PLATELET # BLD AUTO: 140 10*3/MM3 (ref 140–450)
PMV BLD AUTO: 10 FL (ref 6–12)
PMV BLD AUTO: 10.4 FL (ref 6–12)
PMV BLD AUTO: 9.9 FL (ref 6–12)
PO2 BLDA: 135 MM HG (ref 83–108)
PO2 BLDA: 139 MMHG (ref 80–105)
PO2 BLDA: 197 MMHG (ref 80–105)
PO2 BLDA: 383 MMHG (ref 80–105)
PO2 BLDA: 394 MMHG (ref 80–105)
PO2 BLDA: 397 MMHG (ref 80–105)
PO2 BLDA: 398 MMHG (ref 80–105)
PO2 BLDA: 80.9 MM HG (ref 83–108)
PO2 TEMP ADJ BLD: 135 MM HG (ref 83–108)
PO2 TEMP ADJ BLD: 80.9 MM HG (ref 83–108)
POTASSIUM BLD-SCNC: 3.6 MMOL/L (ref 3.5–5.2)
POTASSIUM BLD-SCNC: 4.4 MMOL/L (ref 3.5–5.2)
POTASSIUM BLD-SCNC: 4.5 MMOL/L (ref 3.5–5.2)
POTASSIUM BLDA-SCNC: 4.1 MMOL/L (ref 3.5–4.9)
POTASSIUM BLDA-SCNC: 4.5 MMOL/L (ref 3.5–4.9)
POTASSIUM BLDA-SCNC: 5.2 MMOL/L (ref 3.5–4.9)
POTASSIUM BLDA-SCNC: 5.5 MMOL/L (ref 3.5–4.9)
POTASSIUM BLDA-SCNC: 6.3 MMOL/L (ref 3.5–4.9)
POTASSIUM BLDA-SCNC: 6.4 MMOL/L (ref 3.5–4.9)
PROTHROMBIN TIME: 17.2 SECONDS (ref 11.2–14.3)
PSV: 10 CMH2O
PSV: 10 CMH2O
RBC # BLD AUTO: 3.46 10*6/MM3 (ref 4.14–5.8)
RBC # BLD AUTO: 3.73 10*6/MM3 (ref 4.14–5.8)
RBC # BLD AUTO: 3.78 10*6/MM3 (ref 4.14–5.8)
RH BLD: POSITIVE
SAO2 % BLDA: 100 % (ref 95–98)
SAO2 % BLDA: 99 % (ref 95–98)
SET MECH RESP RATE: 18
SODIUM BLD-SCNC: 139 MMOL/L (ref 136–145)
SODIUM BLD-SCNC: 142 MMOL/L (ref 136–145)
SODIUM BLD-SCNC: 143 MMOL/L (ref 136–145)
SODIUM BLDA-SCNC: 135 MMOL/L (ref 138–146)
SODIUM BLDA-SCNC: 136 MMOL/L (ref 138–146)
SODIUM BLDA-SCNC: 137 MMOL/L (ref 138–146)
SODIUM BLDA-SCNC: 141 MMOL/L (ref 138–146)
TOTAL RATE: 18 BREATHS/MINUTE
TOTAL RATE: 19 BREATHS/MINUTE
VENTILATOR MODE: ABNORMAL
VT ON VENT VENT: 500 ML
WBC NRBC COR # BLD: 10.02 10*3/MM3 (ref 3.4–10.8)
WBC NRBC COR # BLD: 10.83 10*3/MM3 (ref 3.4–10.8)
WBC NRBC COR # BLD: 11.07 10*3/MM3 (ref 3.4–10.8)

## 2020-02-05 PROCEDURE — 25010000002 FENTANYL CITRATE (PF) 100 MCG/2ML SOLUTION: Performed by: THORACIC SURGERY (CARDIOTHORACIC VASCULAR SURGERY)

## 2020-02-05 PROCEDURE — 25010000002 PROPOFOL 10 MG/ML EMULSION: Performed by: THORACIC SURGERY (CARDIOTHORACIC VASCULAR SURGERY)

## 2020-02-05 PROCEDURE — C1751 CATH, INF, PER/CENT/MIDLINE: HCPCS | Performed by: THORACIC SURGERY (CARDIOTHORACIC VASCULAR SURGERY)

## 2020-02-05 PROCEDURE — 021209W BYPASS CORONARY ARTERY, THREE ARTERIES FROM AORTA WITH AUTOLOGOUS VENOUS TISSUE, OPEN APPROACH: ICD-10-PCS | Performed by: THORACIC SURGERY (CARDIOTHORACIC VASCULAR SURGERY)

## 2020-02-05 PROCEDURE — 93005 ELECTROCARDIOGRAM TRACING: CPT | Performed by: PHYSICIAN ASSISTANT

## 2020-02-05 PROCEDURE — 25010000002 CEFUROXIME: Performed by: PHYSICIAN ASSISTANT

## 2020-02-05 PROCEDURE — 80069 RENAL FUNCTION PANEL: CPT | Performed by: THORACIC SURGERY (CARDIOTHORACIC VASCULAR SURGERY)

## 2020-02-05 PROCEDURE — 25010000002 ONDANSETRON PER 1 MG: Performed by: PHYSICIAN ASSISTANT

## 2020-02-05 PROCEDURE — 84132 ASSAY OF SERUM POTASSIUM: CPT

## 2020-02-05 PROCEDURE — 25010000002 MIDAZOLAM PER 1 MG: Performed by: ANESTHESIOLOGY

## 2020-02-05 PROCEDURE — 94799 UNLISTED PULMONARY SVC/PX: CPT

## 2020-02-05 PROCEDURE — 5A1221Z PERFORMANCE OF CARDIAC OUTPUT, CONTINUOUS: ICD-10-PCS | Performed by: THORACIC SURGERY (CARDIOTHORACIC VASCULAR SURGERY)

## 2020-02-05 PROCEDURE — P9041 ALBUMIN (HUMAN),5%, 50ML: HCPCS | Performed by: THORACIC SURGERY (CARDIOTHORACIC VASCULAR SURGERY)

## 2020-02-05 PROCEDURE — C1751 CATH, INF, PER/CENT/MIDLINE: HCPCS | Performed by: ANESTHESIOLOGY

## 2020-02-05 PROCEDURE — 85610 PROTHROMBIN TIME: CPT | Performed by: PHYSICIAN ASSISTANT

## 2020-02-05 PROCEDURE — 71045 X-RAY EXAM CHEST 1 VIEW: CPT

## 2020-02-05 PROCEDURE — P9041 ALBUMIN (HUMAN),5%, 50ML: HCPCS

## 2020-02-05 PROCEDURE — 82803 BLOOD GASES ANY COMBINATION: CPT

## 2020-02-05 PROCEDURE — 25010000002 PROTAMINE SULFATE PER 10 MG: Performed by: ANESTHESIOLOGY

## 2020-02-05 PROCEDURE — 25010000002 PROPOFOL 10 MG/ML EMULSION: Performed by: ANESTHESIOLOGY

## 2020-02-05 PROCEDURE — 63710000001 INSULIN REGULAR HUMAN PER 5 UNITS: Performed by: ANESTHESIOLOGY

## 2020-02-05 PROCEDURE — 82330 ASSAY OF CALCIUM: CPT | Performed by: PHYSICIAN ASSISTANT

## 2020-02-05 PROCEDURE — 25010000002 PHENYLEPHRINE PER 1 ML: Performed by: ANESTHESIOLOGY

## 2020-02-05 PROCEDURE — 25010000002 MORPHINE PER 10 MG: Performed by: THORACIC SURGERY (CARDIOTHORACIC VASCULAR SURGERY)

## 2020-02-05 PROCEDURE — 85027 COMPLETE CBC AUTOMATED: CPT | Performed by: PHYSICIAN ASSISTANT

## 2020-02-05 PROCEDURE — 82805 BLOOD GASES W/O2 SATURATION: CPT

## 2020-02-05 PROCEDURE — 25010000002 HEPARIN (PORCINE) PER 1000 UNITS: Performed by: ANESTHESIOLOGY

## 2020-02-05 PROCEDURE — 83735 ASSAY OF MAGNESIUM: CPT | Performed by: THORACIC SURGERY (CARDIOTHORACIC VASCULAR SURGERY)

## 2020-02-05 PROCEDURE — 33519 CABG ARTERY-VEIN THREE: CPT | Performed by: THORACIC SURGERY (CARDIOTHORACIC VASCULAR SURGERY)

## 2020-02-05 PROCEDURE — 25010000002 ALBUMIN HUMAN 5% PER 50 ML: Performed by: THORACIC SURGERY (CARDIOTHORACIC VASCULAR SURGERY)

## 2020-02-05 PROCEDURE — 25010000002 ALBUMIN HUMAN 5% PER 50 ML

## 2020-02-05 PROCEDURE — 94770: CPT

## 2020-02-05 PROCEDURE — 86900 BLOOD TYPING SEROLOGIC ABO: CPT

## 2020-02-05 PROCEDURE — 85347 COAGULATION TIME ACTIVATED: CPT

## 2020-02-05 PROCEDURE — 84295 ASSAY OF SERUM SODIUM: CPT

## 2020-02-05 PROCEDURE — A4648 IMPLANTABLE TISSUE MARKER: HCPCS | Performed by: THORACIC SURGERY (CARDIOTHORACIC VASCULAR SURGERY)

## 2020-02-05 PROCEDURE — 86901 BLOOD TYPING SEROLOGIC RH(D): CPT

## 2020-02-05 PROCEDURE — 94002 VENT MGMT INPAT INIT DAY: CPT

## 2020-02-05 PROCEDURE — 25010000003 POTASSIUM CHLORIDE PER 2 MEQ: Performed by: PHYSICIAN ASSISTANT

## 2020-02-05 PROCEDURE — 85730 THROMBOPLASTIN TIME PARTIAL: CPT | Performed by: PHYSICIAN ASSISTANT

## 2020-02-05 PROCEDURE — 06BP4ZZ EXCISION OF RIGHT SAPHENOUS VEIN, PERCUTANEOUS ENDOSCOPIC APPROACH: ICD-10-PCS | Performed by: THORACIC SURGERY (CARDIOTHORACIC VASCULAR SURGERY)

## 2020-02-05 PROCEDURE — 85014 HEMATOCRIT: CPT

## 2020-02-05 PROCEDURE — 25010000002 ALBUMIN HUMAN 5% PER 50 ML: Performed by: ANESTHESIOLOGY

## 2020-02-05 PROCEDURE — 93010 ELECTROCARDIOGRAM REPORT: CPT | Performed by: INTERNAL MEDICINE

## 2020-02-05 PROCEDURE — 25010000002 HEPARIN (PORCINE) PER 1000 UNITS: Performed by: THORACIC SURGERY (CARDIOTHORACIC VASCULAR SURGERY)

## 2020-02-05 PROCEDURE — 85027 COMPLETE CBC AUTOMATED: CPT | Performed by: THORACIC SURGERY (CARDIOTHORACIC VASCULAR SURGERY)

## 2020-02-05 PROCEDURE — 82947 ASSAY GLUCOSE BLOOD QUANT: CPT

## 2020-02-05 PROCEDURE — 25010000002 PROTAMINE SULFATE PER 10 MG: Performed by: THORACIC SURGERY (CARDIOTHORACIC VASCULAR SURGERY)

## 2020-02-05 PROCEDURE — 33533 CABG ARTERIAL SINGLE: CPT | Performed by: THORACIC SURGERY (CARDIOTHORACIC VASCULAR SURGERY)

## 2020-02-05 PROCEDURE — 25010000002 PAPAVERINE PER 60 MG: Performed by: THORACIC SURGERY (CARDIOTHORACIC VASCULAR SURGERY)

## 2020-02-05 PROCEDURE — 99232 SBSQ HOSP IP/OBS MODERATE 35: CPT | Performed by: PHYSICIAN ASSISTANT

## 2020-02-05 PROCEDURE — 80069 RENAL FUNCTION PANEL: CPT | Performed by: PHYSICIAN ASSISTANT

## 2020-02-05 PROCEDURE — 33508 ENDOSCOPIC VEIN HARVEST: CPT | Performed by: THORACIC SURGERY (CARDIOTHORACIC VASCULAR SURGERY)

## 2020-02-05 PROCEDURE — P9041 ALBUMIN (HUMAN),5%, 50ML: HCPCS | Performed by: ANESTHESIOLOGY

## 2020-02-05 PROCEDURE — C1894 INTRO/SHEATH, NON-LASER: HCPCS | Performed by: THORACIC SURGERY (CARDIOTHORACIC VASCULAR SURGERY)

## 2020-02-05 PROCEDURE — 82330 ASSAY OF CALCIUM: CPT

## 2020-02-05 PROCEDURE — 25010000003 CEFUROXIME SODIUM 1.5 G RECONSTITUTED SOLUTION: Performed by: PHYSICIAN ASSISTANT

## 2020-02-05 PROCEDURE — 02100Z9 BYPASS CORONARY ARTERY, ONE ARTERY FROM LEFT INTERNAL MAMMARY, OPEN APPROACH: ICD-10-PCS | Performed by: THORACIC SURGERY (CARDIOTHORACIC VASCULAR SURGERY)

## 2020-02-05 PROCEDURE — 99232 SBSQ HOSP IP/OBS MODERATE 35: CPT | Performed by: INTERNAL MEDICINE

## 2020-02-05 PROCEDURE — 25810000003 DEXTROSE 5 % WITH KCL 20 MEQ 20-5 MEQ/L-% SOLUTION: Performed by: PHYSICIAN ASSISTANT

## 2020-02-05 PROCEDURE — 83735 ASSAY OF MAGNESIUM: CPT | Performed by: PHYSICIAN ASSISTANT

## 2020-02-05 DEVICE — DISK-SHAPED STYLE, SILICONE (1 PER STERILE PKG)
Type: IMPLANTABLE DEVICE | Status: FUNCTIONAL
Brand: SCANLAN® RADIOMARK® GRAFT MARKERS

## 2020-02-05 RX ORDER — FENTANYL CITRATE 50 UG/ML
25 INJECTION, SOLUTION INTRAMUSCULAR; INTRAVENOUS
Status: DISCONTINUED | OUTPATIENT
Start: 2020-02-05 | End: 2020-02-06

## 2020-02-05 RX ORDER — AMOXICILLIN 250 MG
2 CAPSULE ORAL 2 TIMES DAILY
Status: DISCONTINUED | OUTPATIENT
Start: 2020-02-05 | End: 2020-02-11 | Stop reason: HOSPADM

## 2020-02-05 RX ORDER — PAPAVERINE HYDROCHLORIDE 30 MG/ML
INJECTION INTRAMUSCULAR; INTRAVENOUS AS NEEDED
Status: DISCONTINUED | OUTPATIENT
Start: 2020-02-05 | End: 2020-02-05 | Stop reason: HOSPADM

## 2020-02-05 RX ORDER — CHLORHEXIDINE GLUCONATE 0.12 MG/ML
15 RINSE ORAL ONCE
Status: COMPLETED | OUTPATIENT
Start: 2020-02-05 | End: 2020-02-05

## 2020-02-05 RX ORDER — MORPHINE SULFATE 2 MG/ML
2 INJECTION, SOLUTION INTRAMUSCULAR; INTRAVENOUS
Status: DISCONTINUED | OUTPATIENT
Start: 2020-02-05 | End: 2020-02-06

## 2020-02-05 RX ORDER — ONDANSETRON 2 MG/ML
4 INJECTION INTRAMUSCULAR; INTRAVENOUS EVERY 6 HOURS PRN
Status: DISCONTINUED | OUTPATIENT
Start: 2020-02-05 | End: 2020-02-11 | Stop reason: HOSPADM

## 2020-02-05 RX ORDER — SODIUM CHLORIDE, SODIUM LACTATE, POTASSIUM CHLORIDE, CALCIUM CHLORIDE 600; 310; 30; 20 MG/100ML; MG/100ML; MG/100ML; MG/100ML
9 INJECTION, SOLUTION INTRAVENOUS CONTINUOUS PRN
Status: DISCONTINUED | OUTPATIENT
Start: 2020-02-05 | End: 2020-02-05 | Stop reason: HOSPADM

## 2020-02-05 RX ORDER — ASPIRIN 325 MG
325 TABLET ORAL ONCE
Status: COMPLETED | OUTPATIENT
Start: 2020-02-05 | End: 2020-02-05

## 2020-02-05 RX ORDER — PHENYLEPHRINE HCL IN 0.9% NACL 0.5 MG/5ML
.5-3 SYRINGE (ML) INTRAVENOUS CONTINUOUS PRN
Status: DISCONTINUED | OUTPATIENT
Start: 2020-02-05 | End: 2020-02-07

## 2020-02-05 RX ORDER — ALBUTEROL SULFATE 2.5 MG/3ML
2.5 SOLUTION RESPIRATORY (INHALATION) EVERY 4 HOURS PRN
Status: DISCONTINUED | OUTPATIENT
Start: 2020-02-05 | End: 2020-02-06

## 2020-02-05 RX ORDER — MILRINONE LACTATE 0.2 MG/ML
.25-.75 INJECTION, SOLUTION INTRAVENOUS
Status: DISCONTINUED | OUTPATIENT
Start: 2020-02-05 | End: 2020-02-06

## 2020-02-05 RX ORDER — SUFENTANIL CITRATE 50 UG/ML
INJECTION EPIDURAL; INTRAVENOUS AS NEEDED
Status: DISCONTINUED | OUTPATIENT
Start: 2020-02-05 | End: 2020-02-05 | Stop reason: SURG

## 2020-02-05 RX ORDER — NITROGLYCERIN 0.4 MG/1
0.4 TABLET SUBLINGUAL
Status: DISCONTINUED | OUTPATIENT
Start: 2020-02-05 | End: 2020-02-05 | Stop reason: HOSPADM

## 2020-02-05 RX ORDER — NOREPINEPHRINE BITARTRATE 1 MG/ML
INJECTION, SOLUTION INTRAVENOUS CONTINUOUS PRN
Status: DISCONTINUED | OUTPATIENT
Start: 2020-02-05 | End: 2020-02-05 | Stop reason: SURG

## 2020-02-05 RX ORDER — CALCIUM CHLORIDE 100 MG/ML
INJECTION INTRAVENOUS; INTRAVENTRICULAR AS NEEDED
Status: DISCONTINUED | OUTPATIENT
Start: 2020-02-05 | End: 2020-02-05 | Stop reason: SURG

## 2020-02-05 RX ORDER — OXYCODONE HYDROCHLORIDE AND ACETAMINOPHEN 5; 325 MG/1; MG/1
2 TABLET ORAL EVERY 4 HOURS PRN
Status: DISCONTINUED | OUTPATIENT
Start: 2020-02-05 | End: 2020-02-06

## 2020-02-05 RX ORDER — ATORVASTATIN CALCIUM 40 MG/1
40 TABLET, FILM COATED ORAL NIGHTLY
Status: DISCONTINUED | OUTPATIENT
Start: 2020-02-05 | End: 2020-02-05

## 2020-02-05 RX ORDER — NOREPINEPHRINE BIT/0.9 % NACL 8 MG/250ML
.02-.3 INFUSION BOTTLE (ML) INTRAVENOUS CONTINUOUS PRN
Status: DISCONTINUED | OUTPATIENT
Start: 2020-02-05 | End: 2020-02-07

## 2020-02-05 RX ORDER — MAGNESIUM HYDROXIDE 1200 MG/15ML
LIQUID ORAL AS NEEDED
Status: DISCONTINUED | OUTPATIENT
Start: 2020-02-05 | End: 2020-02-05 | Stop reason: HOSPADM

## 2020-02-05 RX ORDER — DOBUTAMINE HYDROCHLORIDE 100 MG/100ML
2-20 INJECTION INTRAVENOUS CONTINUOUS PRN
Status: DISCONTINUED | OUTPATIENT
Start: 2020-02-05 | End: 2020-02-07

## 2020-02-05 RX ORDER — ROCURONIUM BROMIDE 10 MG/ML
INJECTION, SOLUTION INTRAVENOUS AS NEEDED
Status: DISCONTINUED | OUTPATIENT
Start: 2020-02-05 | End: 2020-02-05 | Stop reason: SURG

## 2020-02-05 RX ORDER — ATORVASTATIN CALCIUM 40 MG/1
80 TABLET, FILM COATED ORAL NIGHTLY
Status: DISCONTINUED | OUTPATIENT
Start: 2020-02-05 | End: 2020-02-11 | Stop reason: HOSPADM

## 2020-02-05 RX ORDER — ALBUMIN, HUMAN INJ 5% 5 %
SOLUTION INTRAVENOUS CONTINUOUS PRN
Status: DISCONTINUED | OUTPATIENT
Start: 2020-02-05 | End: 2020-02-05 | Stop reason: SURG

## 2020-02-05 RX ORDER — BISACODYL 10 MG
10 SUPPOSITORY, RECTAL RECTAL DAILY PRN
Status: DISCONTINUED | OUTPATIENT
Start: 2020-02-05 | End: 2020-02-11 | Stop reason: HOSPADM

## 2020-02-05 RX ORDER — LIDOCAINE HYDROCHLORIDE 20 MG/ML
INJECTION, SOLUTION INFILTRATION; PERINEURAL AS NEEDED
Status: DISCONTINUED | OUTPATIENT
Start: 2020-02-05 | End: 2020-02-05 | Stop reason: SURG

## 2020-02-05 RX ORDER — ETOMIDATE 2 MG/ML
INJECTION INTRAVENOUS AS NEEDED
Status: DISCONTINUED | OUTPATIENT
Start: 2020-02-05 | End: 2020-02-05 | Stop reason: SURG

## 2020-02-05 RX ORDER — ALBUMIN, HUMAN INJ 5% 5 %
500 SOLUTION INTRAVENOUS AS NEEDED
Status: COMPLETED | OUTPATIENT
Start: 2020-02-05 | End: 2020-02-05

## 2020-02-05 RX ORDER — POTASSIUM CHLORIDE 1.5 G/1.77G
40 POWDER, FOR SOLUTION ORAL AS NEEDED
Status: DISCONTINUED | OUTPATIENT
Start: 2020-02-05 | End: 2020-02-11 | Stop reason: HOSPADM

## 2020-02-05 RX ORDER — ASPIRIN 325 MG
325 TABLET, DELAYED RELEASE (ENTERIC COATED) ORAL DAILY
Status: DISCONTINUED | OUTPATIENT
Start: 2020-02-06 | End: 2020-02-11 | Stop reason: HOSPADM

## 2020-02-05 RX ORDER — HYDROCODONE BITARTRATE AND ACETAMINOPHEN 7.5; 325 MG/1; MG/1
1 TABLET ORAL EVERY 4 HOURS PRN
Status: DISCONTINUED | OUTPATIENT
Start: 2020-02-05 | End: 2020-02-11 | Stop reason: HOSPADM

## 2020-02-05 RX ORDER — NITROGLYCERIN 20 MG/100ML
5-200 INJECTION INTRAVENOUS CONTINUOUS PRN
Status: DISCONTINUED | OUTPATIENT
Start: 2020-02-05 | End: 2020-02-07

## 2020-02-05 RX ORDER — VECURONIUM BROMIDE 1 MG/ML
INJECTION, POWDER, LYOPHILIZED, FOR SOLUTION INTRAVENOUS AS NEEDED
Status: DISCONTINUED | OUTPATIENT
Start: 2020-02-05 | End: 2020-02-05 | Stop reason: SURG

## 2020-02-05 RX ORDER — POTASSIUM CHLORIDE 29.8 MG/ML
20 INJECTION INTRAVENOUS
Status: DISCONTINUED | OUTPATIENT
Start: 2020-02-05 | End: 2020-02-06

## 2020-02-05 RX ORDER — PROTAMINE SULFATE 10 MG/ML
100 INJECTION, SOLUTION INTRAVENOUS ONCE
Status: COMPLETED | OUTPATIENT
Start: 2020-02-05 | End: 2020-02-05

## 2020-02-05 RX ORDER — POTASSIUM CHLORIDE, DEXTROSE MONOHYDRATE 150; 5 MG/100ML; G/100ML
30 INJECTION, SOLUTION INTRAVENOUS CONTINUOUS
Status: DISCONTINUED | OUTPATIENT
Start: 2020-02-05 | End: 2020-02-07

## 2020-02-05 RX ORDER — AMINOCAPROIC ACID 250 MG/ML
INJECTION, SOLUTION INTRAVENOUS AS NEEDED
Status: DISCONTINUED | OUTPATIENT
Start: 2020-02-05 | End: 2020-02-05 | Stop reason: SURG

## 2020-02-05 RX ORDER — DOPAMINE HYDROCHLORIDE 160 MG/100ML
2-20 INJECTION, SOLUTION INTRAVENOUS CONTINUOUS PRN
Status: DISCONTINUED | OUTPATIENT
Start: 2020-02-05 | End: 2020-02-06

## 2020-02-05 RX ORDER — PROTAMINE SULFATE 10 MG/ML
50 INJECTION, SOLUTION INTRAVENOUS ONCE
Status: DISCONTINUED | OUTPATIENT
Start: 2020-02-05 | End: 2020-02-05

## 2020-02-05 RX ORDER — GLYCOPYRROLATE 0.2 MG/ML
INJECTION INTRAMUSCULAR; INTRAVENOUS AS NEEDED
Status: DISCONTINUED | OUTPATIENT
Start: 2020-02-05 | End: 2020-02-05 | Stop reason: SURG

## 2020-02-05 RX ORDER — PROTAMINE SULFATE 10 MG/ML
INJECTION, SOLUTION INTRAVENOUS AS NEEDED
Status: DISCONTINUED | OUTPATIENT
Start: 2020-02-05 | End: 2020-02-05 | Stop reason: SURG

## 2020-02-05 RX ORDER — FAMOTIDINE 20 MG/1
20 TABLET, FILM COATED ORAL
Status: DISCONTINUED | OUTPATIENT
Start: 2020-02-05 | End: 2020-02-05 | Stop reason: HOSPADM

## 2020-02-05 RX ORDER — BISACODYL 5 MG/1
10 TABLET, DELAYED RELEASE ORAL DAILY PRN
Status: DISCONTINUED | OUTPATIENT
Start: 2020-02-05 | End: 2020-02-11 | Stop reason: HOSPADM

## 2020-02-05 RX ORDER — ACETAMINOPHEN 325 MG/1
650 TABLET ORAL EVERY 4 HOURS PRN
Status: DISCONTINUED | OUTPATIENT
Start: 2020-02-05 | End: 2020-02-11 | Stop reason: HOSPADM

## 2020-02-05 RX ORDER — MEPERIDINE HYDROCHLORIDE 25 MG/ML
25 INJECTION INTRAMUSCULAR; INTRAVENOUS; SUBCUTANEOUS EVERY 4 HOURS PRN
Status: DISCONTINUED | OUTPATIENT
Start: 2020-02-05 | End: 2020-02-06

## 2020-02-05 RX ORDER — SODIUM CHLORIDE 0.9 % (FLUSH) 0.9 %
30 SYRINGE (ML) INJECTION ONCE AS NEEDED
Status: DISCONTINUED | OUTPATIENT
Start: 2020-02-05 | End: 2020-02-06

## 2020-02-05 RX ORDER — ALBUMIN, HUMAN INJ 5% 5 %
SOLUTION INTRAVENOUS
Status: COMPLETED
Start: 2020-02-05 | End: 2020-02-05

## 2020-02-05 RX ORDER — ACETAMINOPHEN 325 MG/1
650 TABLET ORAL EVERY 4 HOURS PRN
Status: DISCONTINUED | OUTPATIENT
Start: 2020-02-05 | End: 2020-02-05 | Stop reason: HOSPADM

## 2020-02-05 RX ORDER — HEPARIN SODIUM 1000 [USP'U]/ML
INJECTION, SOLUTION INTRAVENOUS; SUBCUTANEOUS AS NEEDED
Status: DISCONTINUED | OUTPATIENT
Start: 2020-02-05 | End: 2020-02-05 | Stop reason: SURG

## 2020-02-05 RX ORDER — POTASSIUM CHLORIDE 750 MG/1
40 CAPSULE, EXTENDED RELEASE ORAL AS NEEDED
Status: DISCONTINUED | OUTPATIENT
Start: 2020-02-05 | End: 2020-02-11 | Stop reason: HOSPADM

## 2020-02-05 RX ORDER — LIDOCAINE HYDROCHLORIDE 10 MG/ML
0.5 INJECTION, SOLUTION EPIDURAL; INFILTRATION; INTRACAUDAL; PERINEURAL ONCE AS NEEDED
Status: COMPLETED | OUTPATIENT
Start: 2020-02-05 | End: 2020-02-05

## 2020-02-05 RX ORDER — NALOXONE HYDROCHLORIDE 0.4 MG/ML
0.2 INJECTION, SOLUTION INTRAMUSCULAR; INTRAVENOUS; SUBCUTANEOUS AS NEEDED
Status: DISCONTINUED | OUTPATIENT
Start: 2020-02-05 | End: 2020-02-11 | Stop reason: HOSPADM

## 2020-02-05 RX ORDER — MIDAZOLAM HYDROCHLORIDE 1 MG/ML
INJECTION INTRAMUSCULAR; INTRAVENOUS AS NEEDED
Status: DISCONTINUED | OUTPATIENT
Start: 2020-02-05 | End: 2020-02-05 | Stop reason: SURG

## 2020-02-05 RX ORDER — SODIUM CHLORIDE 9 MG/ML
INJECTION, SOLUTION INTRAVENOUS AS NEEDED
Status: DISCONTINUED | OUTPATIENT
Start: 2020-02-05 | End: 2020-02-05 | Stop reason: HOSPADM

## 2020-02-05 RX ORDER — CHLORHEXIDINE GLUCONATE 0.12 MG/ML
15 RINSE ORAL EVERY 12 HOURS SCHEDULED
Status: DISCONTINUED | OUTPATIENT
Start: 2020-02-05 | End: 2020-02-06

## 2020-02-05 RX ORDER — METOPROLOL TARTRATE 5 MG/5ML
2.5 INJECTION INTRAVENOUS EVERY 6 HOURS SCHEDULED
Status: DISCONTINUED | OUTPATIENT
Start: 2020-02-05 | End: 2020-02-06

## 2020-02-05 RX ORDER — ALBUMIN, HUMAN INJ 5% 5 %
500 SOLUTION INTRAVENOUS ONCE
Status: COMPLETED | OUTPATIENT
Start: 2020-02-05 | End: 2020-02-05

## 2020-02-05 RX ORDER — SODIUM CHLORIDE 9 MG/ML
30 INJECTION, SOLUTION INTRAVENOUS CONTINUOUS PRN
Status: DISCONTINUED | OUTPATIENT
Start: 2020-02-05 | End: 2020-02-06

## 2020-02-05 RX ORDER — SODIUM CHLORIDE 9 MG/ML
INJECTION, SOLUTION INTRAVENOUS CONTINUOUS PRN
Status: DISCONTINUED | OUTPATIENT
Start: 2020-02-05 | End: 2020-02-05 | Stop reason: SURG

## 2020-02-05 RX ADMIN — CEFUROXIME 1.5 G: 1.5 INJECTION, POWDER, FOR SOLUTION INTRAVENOUS at 07:17

## 2020-02-05 RX ADMIN — SUFENTANIL CITRATE 50 MCG: 50 INJECTION EPIDURAL; INTRAVENOUS at 07:05

## 2020-02-05 RX ADMIN — INSULIN HUMAN 10 UNITS: 100 INJECTION, SOLUTION PARENTERAL at 10:33

## 2020-02-05 RX ADMIN — INSULIN HUMAN 2.4 UNITS/HR: 1 INJECTION, SOLUTION INTRAVENOUS at 15:29

## 2020-02-05 RX ADMIN — ASPIRIN 325 MG ORAL TABLET 325 MG: 325 PILL ORAL at 14:42

## 2020-02-05 RX ADMIN — ALBUMIN HUMAN 500 ML: 0.05 INJECTION, SOLUTION INTRAVENOUS at 12:10

## 2020-02-05 RX ADMIN — ROCURONIUM BROMIDE 100 MG: 10 INJECTION INTRAVENOUS at 07:05

## 2020-02-05 RX ADMIN — CHLORHEXIDINE GLUCONATE 0.12% ORAL RINSE 15 ML: 1.2 LIQUID ORAL at 06:12

## 2020-02-05 RX ADMIN — ALBUMIN HUMAN: 0.05 INJECTION, SOLUTION INTRAVENOUS at 10:30

## 2020-02-05 RX ADMIN — CALCIUM CHLORIDE 1 G: 100 INJECTION INTRAVENOUS; INTRAVENTRICULAR at 10:13

## 2020-02-05 RX ADMIN — LIDOCAINE HYDROCHLORIDE 0.2 ML: 10 INJECTION, SOLUTION EPIDURAL; INFILTRATION; INTRACAUDAL; PERINEURAL at 05:55

## 2020-02-05 RX ADMIN — MUPIROCIN 1 APPLICATION: 20 OINTMENT TOPICAL at 06:11

## 2020-02-05 RX ADMIN — ATORVASTATIN CALCIUM 80 MG: 40 TABLET, FILM COATED ORAL at 22:40

## 2020-02-05 RX ADMIN — PROTAMINE SULFATE 100 MG: 10 INJECTION, SOLUTION INTRAVENOUS at 12:09

## 2020-02-05 RX ADMIN — NOREPINEPHRINE BITARTRATE 0.05 MCG/KG/MIN: 1 INJECTION, SOLUTION, CONCENTRATE INTRAVENOUS at 10:09

## 2020-02-05 RX ADMIN — FENTANYL CITRATE 25 MCG: 0.05 INJECTION, SOLUTION INTRAMUSCULAR; INTRAVENOUS at 18:42

## 2020-02-05 RX ADMIN — ALBUMIN, HUMAN INJ 5% 500 ML: 5 SOLUTION at 12:10

## 2020-02-05 RX ADMIN — LIDOCAINE HYDROCHLORIDE 100 MG: 20 INJECTION, SOLUTION INFILTRATION; PERINEURAL at 07:05

## 2020-02-05 RX ADMIN — HYDROCODONE BITARTRATE AND ACETAMINOPHEN 1 TABLET: 7.5; 325 TABLET ORAL at 14:41

## 2020-02-05 RX ADMIN — MILRINONE LACTATE 0.38 MCG/KG/MIN: 1 INJECTION, SOLUTION INTRAVENOUS at 10:09

## 2020-02-05 RX ADMIN — MORPHINE SULFATE 2 MG: 2 INJECTION, SOLUTION INTRAMUSCULAR; INTRAVENOUS at 19:36

## 2020-02-05 RX ADMIN — ALBUMIN HUMAN: 0.05 INJECTION, SOLUTION INTRAVENOUS at 10:23

## 2020-02-05 RX ADMIN — SUFENTANIL CITRATE 50 MCG: 50 INJECTION EPIDURAL; INTRAVENOUS at 10:10

## 2020-02-05 RX ADMIN — PHENYLEPHRINE HYDROCHLORIDE 100 MCG: 10 INJECTION INTRAVENOUS at 07:24

## 2020-02-05 RX ADMIN — OXYCODONE HYDROCHLORIDE AND ACETAMINOPHEN 2 TABLET: 5; 325 TABLET ORAL at 22:41

## 2020-02-05 RX ADMIN — MORPHINE SULFATE 2 MG: 2 INJECTION, SOLUTION INTRAMUSCULAR; INTRAVENOUS at 23:10

## 2020-02-05 RX ADMIN — FENTANYL CITRATE 25 MCG: 0.05 INJECTION, SOLUTION INTRAMUSCULAR; INTRAVENOUS at 16:35

## 2020-02-05 RX ADMIN — PROPOFOL 40 MCG/KG/MIN: 10 INJECTION, EMULSION INTRAVENOUS at 14:23

## 2020-02-05 RX ADMIN — POTASSIUM CHLORIDE AND DEXTROSE MONOHYDRATE 30 ML/HR: 150; 5 INJECTION, SOLUTION INTRAVENOUS at 11:14

## 2020-02-05 RX ADMIN — CEFUROXIME 1.5 G: 1.5 INJECTION, POWDER, FOR SOLUTION INTRAVENOUS at 10:13

## 2020-02-05 RX ADMIN — AMINOCAPROIC ACID 10 G: 250 INJECTION, SOLUTION INTRAVENOUS at 10:13

## 2020-02-05 RX ADMIN — DEXMEDETOMIDINE 0.2 MCG/KG/HR: 100 INJECTION, SOLUTION, CONCENTRATE INTRAVENOUS at 14:00

## 2020-02-05 RX ADMIN — SODIUM CHLORIDE: 9 INJECTION, SOLUTION INTRAVENOUS at 07:17

## 2020-02-05 RX ADMIN — CEFUROXIME SODIUM 1.5 G: 1.5 INJECTION, POWDER, FOR SOLUTION INTRAVENOUS at 17:09

## 2020-02-05 RX ADMIN — PROPOFOL 25 MCG/KG/MIN: 10 INJECTION, EMULSION INTRAVENOUS at 10:48

## 2020-02-05 RX ADMIN — MIDAZOLAM 2 MG: 1 INJECTION INTRAMUSCULAR; INTRAVENOUS at 07:05

## 2020-02-05 RX ADMIN — ETOMIDATE 6 MG: 2 INJECTION, SOLUTION INTRAVENOUS at 07:05

## 2020-02-05 RX ADMIN — SUFENTANIL CITRATE 50 MCG: 50 INJECTION EPIDURAL; INTRAVENOUS at 07:53

## 2020-02-05 RX ADMIN — ONDANSETRON 4 MG: 2 INJECTION INTRAMUSCULAR; INTRAVENOUS at 17:22

## 2020-02-05 RX ADMIN — HEPARIN SODIUM 39000 UNITS: 1000 INJECTION, SOLUTION INTRAVENOUS; SUBCUTANEOUS at 08:27

## 2020-02-05 RX ADMIN — SODIUM CHLORIDE, POTASSIUM CHLORIDE, SODIUM LACTATE AND CALCIUM CHLORIDE 9 ML/HR: 600; 310; 30; 20 INJECTION, SOLUTION INTRAVENOUS at 05:55

## 2020-02-05 RX ADMIN — GLYCOPYRROLATE 0.2 MG: 0.2 INJECTION, SOLUTION INTRAMUSCULAR; INTRAVENOUS at 10:09

## 2020-02-05 RX ADMIN — POTASSIUM CHLORIDE 20 MEQ: 29.8 INJECTION, SOLUTION INTRAVENOUS at 13:51

## 2020-02-05 RX ADMIN — PHENYLEPHRINE HYDROCHLORIDE 100 MCG: 10 INJECTION INTRAVENOUS at 07:20

## 2020-02-05 RX ADMIN — VECURONIUM BROMIDE 10 MG: 1 INJECTION, POWDER, LYOPHILIZED, FOR SOLUTION INTRAVENOUS at 08:49

## 2020-02-05 RX ADMIN — PROTAMINE SULFATE 500 MG: 10 INJECTION, SOLUTION INTRAVENOUS at 10:13

## 2020-02-05 RX ADMIN — MILRINONE LACTATE IN DEXTROSE 0.38 MCG/KG/MIN: 200 INJECTION, SOLUTION INTRAVENOUS at 14:42

## 2020-02-05 RX ADMIN — FAMOTIDINE 20 MG: 20 TABLET, FILM COATED ORAL at 06:10

## 2020-02-05 RX ADMIN — ALBUMIN HUMAN 500 ML: 0.05 INJECTION, SOLUTION INTRAVENOUS at 11:20

## 2020-02-05 RX ADMIN — SUFENTANIL CITRATE 50 MCG: 50 INJECTION EPIDURAL; INTRAVENOUS at 10:20

## 2020-02-05 RX ADMIN — POTASSIUM CHLORIDE AND DEXTROSE MONOHYDRATE 30 ML/HR: 150; 5 INJECTION, SOLUTION INTRAVENOUS at 11:15

## 2020-02-05 RX ADMIN — SENNOSIDES AND DOCUSATE SODIUM 2 TABLET: 8.6; 5 TABLET ORAL at 22:41

## 2020-02-05 RX ADMIN — SUFENTANIL CITRATE 50 MCG: 50 INJECTION EPIDURAL; INTRAVENOUS at 08:20

## 2020-02-05 RX ADMIN — ALBUMIN HUMAN 250 ML: 0.05 INJECTION, SOLUTION INTRAVENOUS at 21:04

## 2020-02-05 RX ADMIN — MORPHINE SULFATE 2 MG: 2 INJECTION, SOLUTION INTRAMUSCULAR; INTRAVENOUS at 21:35

## 2020-02-05 RX ADMIN — CHLORHEXIDINE GLUCONATE 0.12% ORAL RINSE 15 ML: 1.2 LIQUID ORAL at 14:41

## 2020-02-05 RX ADMIN — POTASSIUM PHOSPHATE, MONOBASIC AND POTASSIUM PHOSPHATE, DIBASIC 15 MMOL: 224; 236 INJECTION, SOLUTION INTRAVENOUS at 14:44

## 2020-02-05 RX ADMIN — MILRINONE LACTATE IN DEXTROSE 0.38 MCG/KG/MIN: 200 INJECTION, SOLUTION INTRAVENOUS at 23:41

## 2020-02-05 RX ADMIN — ALBUMIN, HUMAN INJ 5% 500 ML: 5 SOLUTION at 11:20

## 2020-02-05 RX ADMIN — AMINOCAPROIC ACID 10 G: 250 INJECTION, SOLUTION INTRAVENOUS at 07:30

## 2020-02-05 NOTE — ANESTHESIA PREPROCEDURE EVALUATION
Anesthesia Evaluation     Patient summary reviewed and Nursing notes reviewed   NPO Solid Status: > 8 hours  NPO Liquid Status: > 2 hours           Airway   Mallampati: II  TM distance: >3 FB  Neck ROM: full  No difficulty expected  Dental - normal exam     Pulmonary - normal exam    breath sounds clear to auscultation  (+) a smoker (quit x 2 wks, previous 1 ppd smoker) Former, sleep apnea (Possible),   Cardiovascular - normal exam    ECG reviewed  Rhythm: regular  Rate: normal    (+) hypertension, CAD, CHF ,     ROS comment: 1.  80% ostial LAD stenosis, involving origin of a large ramus, and large bifurcating diagonal.  Abnormal IFR in each vessel.  2.  70% stenosis in mid LAD.  3.  Upper division first diagonal 70% stenosis.  4.  90% distal RCA stenosis  5.  LVEF 25%.    Neuro/Psych- negative ROS  GI/Hepatic/Renal/Endo    (+) obesity,       Musculoskeletal (-) negative ROS    Abdominal    Substance History      OB/GYN          Other - negative ROS                       Anesthesia Plan    ASA 4     general   (A-line, CVP/PAC, ICU/Vent post-op)  intravenous induction     Anesthetic plan, all risks, benefits, and alternatives have been provided, discussed and informed consent has been obtained with: patient.

## 2020-02-05 NOTE — PROGRESS NOTES
Intensive Care Follow-up     Hospital:  LOS: 0 days   Mr. Ross Quintero, 54 y.o. male is followed for:   Atherosclerosis of native coronary artery of native heart   Status post CABG x4 followed postoperatively for respiratory insufficiency and hyperglycemia     Subjective   Interval History:  The chart has been reviewed.  This is a 54-year-old gentleman with a history of probable cardio myopathy secondary to ischemic heart disease with an EF on an outside echocardiogram reportedly in the 20% range and later found to have multivessel coronary artery disease who underwent coronary artery bypass grafting x4 today.  He is brought to the intensive care unit for ventilator weaning and critical care support.  He was a previous smoker in the past.  I have been able to review his preoperative spirometry which shows normal flows with no evidence of obstruction.    The patient's past medical, surgical and social history were reviewed and updated in Epic as appropriate.        Objective     Infusions:    dexmedetomidine 0.2-1.5 mcg/kg/hr    dextrose 5 % with KCl 20 mEq 30 mL/hr Last Rate: 30 mL/hr (02/05/20 1115)   DOBUTamine 2-20 mcg/kg/min    DOPamine 2-20 mcg/kg/min    EPINEPHrine 0.02-0.3 mcg/kg/min    insulin 0-50 Units/hr    milrinone 0.25-0.75 mcg/kg/min Last Rate: 0.375 mcg/kg/min (02/05/20 1442)   niCARdipine 5-15 mg/hr    nitroglycerin 5-200 mcg/min    norepinephrine 0.02-0.3 mcg/kg/min Last Rate: 0.12 mcg/kg/min (02/05/20 1125)   phenylephrine 0.5-3 mcg/kg/min    propofol 5-50 mcg/kg/min Last Rate: 40 mcg/kg/min (02/05/20 1423)   sodium chloride 30 mL/hr    vasopressin 0.02-0.1 Units/min      Medications:    [START ON 2/6/2020] aspirin 325 mg Oral Daily   atorvastatin 40 mg Oral Nightly   cefuroxime 1.5 g Intravenous Q8H   chlorhexidine 15 mL Mouth/Throat Q12H   [START ON 2/6/2020] metoprolol tartrate 12.5 mg Oral Q12H   metoprolol tartrate 2.5 mg Intravenous Q6H   [START ON 2/6/2020] pharmacy consult - MTM   Does not apply Daily   sennosides-docusate 2 tablet Oral BID       Vital Sign Min/Max for last 24 hours  Temp  Min: 97.1 °F (36.2 °C)  Max: 97.9 °F (36.6 °C)   BP  Min: 90/57  Max: 135/96   Pulse  Min: 74  Max: 95   Resp  Min: 18  Max: 18   SpO2  Min: 97 %  Max: 100 %   No data recorded       Input/Output for last 24 hour shift  No intake/output data recorded.   FiO2 (%):  [40 %-100 %] 40 %  S RR:  [18] 18  PEEP/CPAP (cm H2O):  [5 cm H20-10 cm H20] 7.5 cm H20  TX SUP:  [10 cm H20] 10 cm H20  MAP (cm H2O):  [10-16] 13  Objective:  General Appearance:  Comfortable and well-appearing (The patient is currently sedated on the ventilator.).    Vital signs: (most recent): Blood pressure 98/60, pulse 81, temperature 98.6 °F (37 °C), temperature source Core, resp. rate 18, SpO2 99 %.    HEENT: (Endotracheal tube in place.  There is a right internal jugular introducer with PA catheter.)    Lungs:  Normal effort and normal respiratory rate.  Breath sounds clear to auscultation.  He is not in respiratory distress.  No wheezes.    Heart: (Minimal rub is heard.  No murmurs heard.  Patient is status post median sternotomy.  Mediastinal tubes in place.)  Chest: Symmetric chest wall expansion.   Abdomen: Abdomen is soft and non-distended.    Extremities: Normal range of motion.  There is no dependent edema.    Neurological: (Sedated).    Pupils:  Pupils are equal, round, and reactive to light.  Pupils are equal.   Skin:  Warm.              Results from last 7 days   Lab Units 02/05/20  1134 02/05/20  1027 02/05/20  1000  02/04/20  1535 01/30/20  1000   WBC 10*3/mm3 10.02  --   --   --  7.04 7.09   HEMOGLOBIN g/dL 12.0*  --   --   --  15.1 15.9   HEMOGLOBIN, POC g/dL  --  9.9* 10.2*   < >  --   --    PLATELETS 10*3/mm3 133*  --   --   --  230 235    < > = values in this interval not displayed.     Results from last 7 days   Lab Units 02/05/20  1134 02/04/20  1535 01/30/20  1000   SODIUM mmol/L 142 139 139   POTASSIUM mmol/L 3.6 4.2  4.5   CO2 mmol/L 24.0 25.0 24.0   BUN mg/dL 13 12 16   CREATININE mg/dL 0.78 0.86 0.89   MAGNESIUM mg/dL 2.9* 2.0  --    PHOSPHORUS mg/dL 2.2*  --   --    GLUCOSE mg/dL 105* 84 113*     Estimated Creatinine Clearance: 131.9 mL/min (by C-G formula based on SCr of 0.78 mg/dL).    Results from last 7 days   Lab Units 02/05/20  1140   PH, ARTERIAL pH units 7.358   PCO2, ARTERIAL mm Hg 45.6   PO2 ART mm Hg 135.0*           I reviewed the patient's results and images.     Assessment/Plan   Impression        Coronary artery disease status post coronary artery bypass grafting x4.    Chronic systolic CHF (congestive heart failure) (CMS/[facility])    HTN (hypertension)       Plan        We will wean the ventilator as tolerated.  The patient's preoperative spirometry was normal.  Continue with insulin drip.  He likely will be able to come off of this easily and just be covered with subcutaneous insulin for hyperglycemia.  There is no evidence or history of diabetes mellitus and his A1c was 6.4.  Pain control will be ensured.       I discussed the patient's findings and my recommendations with nursing staff       Bryon Hamilton MD, [facility]  Pulmonology and Critical Care Medicine

## 2020-02-05 NOTE — ANESTHESIA PROCEDURE NOTES
Airway  Urgency: elective    Date/Time: 2/5/2020 7:08 AM  Airway not difficult    General Information and Staff    Patient location during procedure: OR    Indications and Patient Condition  Indications for airway management: airway protection    Preoxygenated: yes  MILS not maintained throughout  Mask difficulty assessment: 1 - vent by mask    Final Airway Details  Final airway type: endotracheal airway      Successful airway: ETT  Cuffed: yes   Successful intubation technique: direct laryngoscopy  Endotracheal tube insertion site: oral  Blade: Kendrick  Blade size: 3  ETT size (mm): 8.0  Cormack-Lehane Classification: grade I - full view of glottis  Placement verified by: chest auscultation and capnometry   Measured from: lips  ETT/EBT  to lips (cm): 20  Number of attempts at approach: 1  Assessment: lips, teeth, and gum same as pre-op and atraumatic intubation    Additional Comments  Negative epigastric sounds, Breath sound equal bilaterally with symmetric chest rise and fall

## 2020-02-05 NOTE — ANESTHESIA PROCEDURE NOTES
Central Line      Patient reassessed immediately prior to procedure    Patient location during procedure: OR  Indications: vascular access  Staff  Anesthesiologist: Joel Shipman MD  Preanesthetic Checklist  Completed: patient identified, site marked, surgical consent, pre-op evaluation, timeout performed, IV checked, risks and benefits discussed and monitors and equipment checked  Central Line Prep  Sterile Tech:cap, gloves, gown, mask and sterile barriers  Prep: chloraprep  Patient monitoring: blood pressure monitoring, continuous pulse oximetry and EKG  Central Line Procedure  Laterality:right  Location:internal jugular  Catheter Type:Cordis and Dunning-Dawit  Catheter Size:9 Fr  Guidance:landmark technique and palpation technique  PROCEDURE NOTE/ULTRASOUND INTERPRETATION.  Using ultrasound guidance the potential vascular sites for insertion of the catheter were visualized to determine the patency of the vessel to be used for vascular access.  After selecting the appropriate site for insertion, the needle was visualized under ultrasound being inserted into the internal jugular vein, followed by ultrasound confirmation of wire and catheter placement. There were no abnormalities seen on ultrasound; an image was taken; and the patient tolerated the procedure with no complications. Images: still images obtained, printed/placed on chart  Assessment  Post procedure:biopatch applied, line sutured, occlusive dressing applied and secured with tape  Assessement:blood return through all ports, free fluid flow, chest x-ray ordered and Jelani Test  Complications:no  Patient Tolerance:patient tolerated the procedure well with no apparent complications

## 2020-02-05 NOTE — RESEARCH
RISK SCORES Procedure: Isolated CAB CALCULATE   Risk of Mortality:  0.810%    Renal Failure:  1.345%    Permanent Stroke:  0.584%    Prolonged Ventilation:  10.140%    DSW Infection:  0.356%    Reoperation:  1.411%    Morbidity or Mortality:  12.603%    Short Length of Stay:  54.339%    Long Length of Stay:  3.275%

## 2020-02-05 NOTE — PROGRESS NOTES
Continued Stay Note   Mary     Patient Name: Ross Quintero  MRN: [id number]  Today's Date: 2/5/2020    Admit Date: 2/5/2020    Discharge Plan     Row Name 02/05/20 1235       Plan    Plan  case management following    Plan Comments  Pt is sedated and on ventilator following surgery.  SW will follow-up with pt/family tomorrow, 2/6/20 regarding consult for discharge planning.          Discharge Codes    No documentation.             ANTONIO Castillo

## 2020-02-05 NOTE — ANESTHESIA PROCEDURE NOTES
Arterial Line      Patient reassessed immediately prior to procedure    Patient location during procedure: pre-op  Start time: 2/5/2020 6:30 AM   Line placed for hemodynamic monitoring.  Performed By   Anesthesiologist: Joel Shipman MD  Preanesthetic Checklist  Completed: patient identified, site marked, surgical consent, pre-op evaluation, timeout performed, IV checked, risks and benefits discussed and monitors and equipment checked  Arterial Line Prep   Sterile Tech: cap, gloves and sterile barriers  Prep: ChloraPrep  Patient monitoring: blood pressure monitoring, continuous pulse oximetry and EKG  Arterial Line Procedure   Laterality:right  Location:  radial artery  Catheter size: 20 G   Guidance: palpation technique  Number of attempts: 1  Successful placement: yes  Post Assessment   Dressing Type: line sutured, occlusive dressing applied, secured with tape and wrist guard applied.   Complications no  Circ/Move/Sens Assessment: normal and unchanged.   Patient Tolerance: patient tolerated the procedure well with no apparent complications

## 2020-02-05 NOTE — OP NOTE
Operative Report  Ross Quintero  [id number]  1965    Preop Diagnosis: Severe multivessel coronary artery disease        Postop Diagnosis same        Procedure: Coronary artery bypass graft x4 with EVH #1 saphenous vein graft to diagonal and to ramus circumflex #2 saphenous vein graft to PDA #3 left internal mammary artery LAD        Surgeons: Darinel Pardo        Assistant: Roman Joshi        Operative Findings:         Description: The patient was brought to the operating room placed under general anesthesia.  Patient had placement of [place]-Dawit catheter, arterial line, and Contreras catheter.  Patient was sterilely prepped and draped.  The vein was harvested from the right leg from mid tibia to the groin using EVH technique.  Simultaneously median sternotomy incision was then made.  The left internal mammary arteries taken down divided distally.  The patient was heparinized activating clotting time was confirmed be adequate.  Pericardium is open stay sutures were placed.  2 Ethibond sutures and placed in a sending aorta and right atrial appendage for cannulation purposes.  The patient was cannulated arterial venous cannula.  Cardiopulmonary bypass initiated aorta is crossclamped blood cardioplegia is given.  The heart was elevated the ramus circumflex vessels open sharply extended proximally and distally.  End-to-side anastomosis running 7-0 Prolene suture was constructed was tied down.  The diagonal vessels open sharply.  The side of the vein graft was open.  A side-to-side anastomosis running 7-0 Prolene suture was constructed was tied down.  The PDA vessel was opened sharply extended proximally and distally.  An end-to-side anastomosis running 7-0 Prolene suture was constructed was tied down.  LAD was open midportion.  Left internal mammary was brought to the pericardial tunnel.  Is anastomosed to this running 7-0 Prolene suture.  2 aortotomies were then made.  Proximal anastomosis was carried out with  running 6-0 Prolene suture.  Prior to tying the last one down a hotshot of cardioplegia given.  Aortic cross-clamp was removed.  The patient was warmed and weaned from cardiopulmonary bypass in standard fashion as protamine is administered reversal of heparin decannulation was carried out.  Single mediastinal tube was placed.  Left chest tube was placed.  The sternum was reapproximated #7 wire.  The linea alba was closed with #1 Ethibond sutures.  The subcutaneous tissues were closed with 4 layers of running 0 Vicryl, 2-0 Vicryl, 3-0 Vicryl, 3-0 Monocryl subcuticular stitch.  The cardiopulmonary bypass was 84 minutes and cross-clamp was 77 minutes.        EBL: 700 cc      Please note that portions of this note were completed with a voice recognition program. Efforts were made to edit the dictations, but words may be mistranscribed      Darinel Pardo MD  02/05/20 10:49 AM

## 2020-02-05 NOTE — ANESTHESIA POSTPROCEDURE EVALUATION
Patient: Ross Quintero    Procedure Summary     Date:  02/05/20 Room / Location:   [facility] [address] [place] [address] [facility]    Anesthesia Start:  0701 Anesthesia Stop:  1115    Procedure:  MEDIAN STERNOTOMY/ CORONARY ARTERY BYPASS X 4 WITH LEFT INTERNAL MAMMARY ARTERY GRAFT, ENDOSCOPIC VEIN HARVEST OF RIGHT GREATER SAPHENOUS VEIN (N/A Chest) Diagnosis:       Angina at rest (CMS/HCC)      (Angina at rest (CMS/HCC) [I20.8])    Surgeon:  Darinel Pardo MD Provider:  Joel Shipman MD    Anesthesia Type:  general ASA Status:  4          Anesthesia Type: general    Vitals  Vitals Value Taken Time   BP 96/37 2/5/2020 11:15 AM   Temp 97.9 °F (36.6 °C) 2/5/2020 11:15 AM   Pulse 93 2/5/2020 11:15 AM   Resp     SpO2 99 % 2/5/2020 11:15 AM           Post Anesthesia Care and Evaluation    Patient location during evaluation: ICU  Patient participation: complete - patient cannot participate  Post-procedure mental status: sedated.  Pain score: 0  Pain management: adequate  Airway patency: intubated.  Anesthetic complications: No anesthetic complications  PONV Status: none  Cardiovascular status: acceptable  Respiratory status: intubated and ventilator  Hydration status: acceptable

## 2020-02-05 NOTE — PROGRESS NOTES
[facility] at [facility]  PROGRESS NOTE    Date of Admission: 2/5/2020  Date of Service: 02/05/20    Primary Care Physician: Saul Cowan MD    Chief Complaint: f/u SHF, HLD  Problem List:   1. Ischemic Cardiomyopathy/systolic congestive heart failure  a. 1/15/2020 echocardiogram EF 15%.  Mild to moderate mitral regurgitation.  RVSP 45 mmHg.  Biatrial enlargement.  Dilated right ventricle with decreased right ventricular systolic function.  b. LHC 1/30/2020: MVCAD, LVEF 25%  c. CABG x4 2/5/2020: LIMA to LAD, SVG to diagonal and to ramus, SVG to PDA  2. Hypertension  3. Dyslipidemia  4. ETOH abuse  5. Tobacco abuse  6. Surgeries:  a. Lasik surgery  b. Vasectomy        Subjective      Events noted. Patient seen immediately post-op, intubated and sedated       Objective   Vitals: BP 98/60   Pulse 81   Temp 98.6 °F (37 °C) (Core)   Resp 18   SpO2 99%     Physical Exam:  GENERAL: Sedated, intubated   HEART: No discrete PMI is noted. Regular rhythm, normal rate, and no murmurs, gallops, or rubs.   LUNGS: Intubated on mechanical ventilation. No wheezing, rales or rhonchi.  ABDOMEN: Soft, bowel sounds present  NEUROLOGIC: Sedated   EXTREMITIES: No clubbing, cyanosis, or edema noted. RLE wrap in place     Results:  Results from last 7 days   Lab Units 02/05/20  1134 02/05/20  1027 02/05/20  1000  02/04/20  1535 01/30/20  1000   WBC 10*3/mm3 10.02  --   --   --  7.04 7.09   HEMOGLOBIN g/dL 12.0*  --   --   --  15.1 15.9   HEMOGLOBIN, POC g/dL  --  9.9* 10.2*   < >  --   --    HEMATOCRIT % 35.2*  --   --   --  45.1 48.7   HEMATOCRIT POC %  --  29* 30*   < >  --   --    PLATELETS 10*3/mm3 133*  --   --   --  230 235    < > = values in this interval not displayed.     Results from last 7 days   Lab Units 02/05/20  1134 02/04/20  1535 01/30/20  1000   SODIUM mmol/L 142 139 139   POTASSIUM mmol/L 3.6 4.2 4.5   CHLORIDE mmol/L 108* 101 102   CO2 mmol/L 24.0 25.0 24.0   BUN mg/dL 13 12 16    CREATININE mg/dL 0.78 0.86 0.89   GLUCOSE mg/dL 105* 84 113*      Lab Results   Component Value Date    CHOL 210 (H) 01/30/2020    TRIG 96 01/30/2020    HDL 42 01/30/2020     (H) 01/30/2020    AST 19 02/04/2020    ALT 19 02/04/2020     Results from last 7 days   Lab Units 01/30/20  1000   HEMOGLOBIN A1C % 6.40*     Results from last 7 days   Lab Units 01/30/20  1000   CHOLESTEROL mg/dL 210*   TRIGLYCERIDES mg/dL 96   HDL CHOL mg/dL 42   LDL CHOL mg/dL 149*         Results from last 7 days   Lab Units 02/05/20  1134 02/04/20  1535   PROTIME Seconds 17.2* 13.6   INR  1.47* 1.09   APTT seconds 31.1 32.1       Intake/Output Summary (Last 24 hours) at 2/5/2020 1509  Last data filed at 2/5/2020 1400  Gross per 24 hour   Intake 1500 ml   Output 3905 ml   Net -2405 ml       I personally reviewed the patient's EKG/Telemetry data    Radiology Data:   CXR 2/5/2020:  IMPRESSION:  Poststernotomy chest with minimal left basilar discoid  atelectasis. No evidence of pneumothorax or other significant disease is  seen.       Current Medications:    [START ON 2/6/2020] aspirin 325 mg Oral Daily   atorvastatin 40 mg Oral Nightly   cefuroxime 1.5 g Intravenous Q8H   chlorhexidine 15 mL Mouth/Throat Q12H   [START ON 2/6/2020] metoprolol tartrate 12.5 mg Oral Q12H   metoprolol tartrate 2.5 mg Intravenous Q6H   [START ON 2/6/2020] pharmacy consult - MTM  Does not apply Daily   sennosides-docusate 2 tablet Oral BID       dexmedetomidine 0.2-1.5 mcg/kg/hr    dextrose 5 % with KCl 20 mEq 30 mL/hr Last Rate: 30 mL/hr (02/05/20 1115)   DOBUTamine 2-20 mcg/kg/min    DOPamine 2-20 mcg/kg/min    EPINEPHrine 0.02-0.3 mcg/kg/min    insulin 0-50 Units/hr    milrinone 0.25-0.75 mcg/kg/min Last Rate: 0.375 mcg/kg/min (02/05/20 1442)   niCARdipine 5-15 mg/hr    nitroglycerin 5-200 mcg/min    norepinephrine 0.02-0.3 mcg/kg/min Last Rate: 0.12 mcg/kg/min (02/05/20 1125)   phenylephrine 0.5-3 mcg/kg/min    propofol 5-50 mcg/kg/min Last Rate: 40  mcg/kg/min (02/05/20 1423)   sodium chloride 30 mL/hr    vasopressin 0.02-0.1 Units/min        Assessment and Plan:   1. Ischemic CM  - LVEF 25% pre-op with MVCAD by cath   - CABG x4, POD 0  - on low dose Milrinone 0.375mcg/kg/min and NE at 0.08mcg/kg/min with CI 2.2  - continue support   - post op EKG reviewed     2. SHF  - will need to resume Entresto, BB when BP allows for SHF  - will also need Lifevest at discharge     3. HLD  - , needs high dose statin  - will increase Lipitor to 80mg     Electronically signed by Cata Zuniga PA-C, 02/05/20, 3:21 PM.

## 2020-02-05 NOTE — INTERVAL H&P NOTE
2/5/2020      Chief complaint inability to sleep due to shortness of air and fatigue  Subjective:    Patient is a 54 y.o.male presents with abnormal EKG and found to have an EF of 15 - 20% along with 3 vessel disease   Review of Systems:  General ROS: negative  Cardiovascular ROS: no chest pain or dyspnea on exertion  Respiratory ROS:  cough, shortness of breath      Allergies: No Known Allergies       Latex: no allergy  Contrast Dye no allergy    Home Meds    Medications Prior to Admission   Medication Sig Dispense Refill Last Dose   • aspirin 81 MG tablet Take 1 tablet by mouth Daily. (Patient taking differently: Take 81 mg by mouth Daily. Instructed to take 4 tablets at 2100 per orders) 30 tablet 11 2/5/2020 at 0330   • atorvastatin (LIPITOR) 40 MG tablet Take 1 tablet by mouth Daily. 30 tablet 11 2/5/2020 at 0330   • cetirizine (zyrTEC) 10 MG tablet Take 10 mg by mouth Daily. Alternates with Allegra.   2/4/2020 at Unknown time   • metoprolol succinate XL (TOPROL-XL) 50 MG 24 hr tablet Take 50 mg by mouth Daily.   2/5/2020 at 0330   • sacubitril-valsartan (ENTRESTO) 24-26 MG tablet Take 1 tablet by mouth 2 (Two) Times a Day.   2/5/2020 at 0330   • varenicline (CHANTIX) 1 MG tablet Take 1 mg by mouth 2 (Two) Times a Day.   2/5/2020 at 0330   • fexofenadine (ALLEGRA) 180 MG tablet Take 180 mg by mouth Daily. Pt states he alternates with Zyrtec   2/2/2020     PMH:   Past Medical History:   Diagnosis Date   • Cardiomyopathy (CMS/HCC)    • CHF (congestive heart failure) (CMS/HCC)    • Hyperlipidemia    • Hypertension    • Tobacco abuse      PSH:    Past Surgical History:   Procedure Laterality Date   • CARDIAC CATHETERIZATION N/A 1/30/2020    Procedure: Left Heart Cath;  Surgeon: Darinel Martinez MD;  [facility];  Service: Cardiovascular   • CARDIAC CATHETERIZATION  01/30/2020   • LASIK     • VASECTOMY      STATES RECEIVED GENERAL ANESTHESIA FOR THIS   • WISDOM TOOTH EXTRACTION        Immunization History: pneumo no   Flu no  Tetanus no  Social History:   Tobacco quit smoking cigarettes three days ago   Alcohol quit drinking daily 2 months ago      Physical Exam:/69 (BP Location: Left arm, Patient Position: Lying)   Pulse 74   Temp 97.9 °F (36.6 °C) (Temporal)   Resp 18   SpO2 97%       General Appearance:    Alert, cooperative, no distress, appears stated age   Head:    Normocephalic, without obvious abnormality, atraumatic   Lungs:     Clear to auscultation bilaterally, respirations unlabored    Heart: Regular rate and rhythm, S1 and S2 normal, no murmur, rub    or gallop    Abdomen:    Soft without tenderness   Breast Exam:    deferred   Genitalia:    deferred   Extremities:   Extremities normal, atraumatic, no cyanosis or edema   Skin:   Skin color, texture, turgor normal, no rashes or lesions   Neurologic:   Grossly intact     Results Review:   LABS:  Lab Results   Component Value Date    WBC 7.04 02/04/2020    HGB 15.1 02/04/2020    HCT 45.1 02/04/2020    MCV 95.1 02/04/2020     02/04/2020    NEUTROABS 2.48 02/04/2020    GLUCOSE 84 02/04/2020    BUN 12 02/04/2020    CREATININE 0.86 02/04/2020    EGFRIFNONA 93 02/04/2020    EGFRIFAFRI 113 02/04/2020     02/04/2020    K 4.2 02/04/2020     02/04/2020    CO2 25.0 02/04/2020    MG 2.0 02/04/2020    CALCIUM 9.3 02/04/2020    ALBUMIN 4.30 02/04/2020    AST 19 02/04/2020    ALT 19 02/04/2020    BILITOT 0.7 02/04/2020       RADIOLOGY:  Imaging Results (Last 72 Hours)     ** No results found for the last 72 hours. **               Cancer Patient: __ yes __no __unknown; If yes, clinical stage T:__ N:__M:__, stage group    Impression: 54 male presenting with CAD  Plan: CABG today by Dr Darinel Haney, PA 2/5/2020 6:41 AM

## 2020-02-06 ENCOUNTER — APPOINTMENT (OUTPATIENT)
Dept: GENERAL RADIOLOGY | Facility: HOSPITAL | Age: 55
End: 2020-02-06

## 2020-02-06 LAB
ALBUMIN SERPL-MCNC: 4.3 G/DL (ref 3.5–5.2)
ALBUMIN SERPL-MCNC: 4.4 G/DL (ref 3.5–5.2)
ALBUMIN SERPL-MCNC: 4.5 G/DL (ref 3.5–5.2)
ANION GAP SERPL CALCULATED.3IONS-SCNC: 11 MMOL/L (ref 5–15)
ANION GAP SERPL CALCULATED.3IONS-SCNC: 12 MMOL/L (ref 5–15)
ANION GAP SERPL CALCULATED.3IONS-SCNC: 9 MMOL/L (ref 5–15)
BASOPHILS # BLD AUTO: 0.07 10*3/MM3 (ref 0–0.2)
BASOPHILS NFR BLD AUTO: 0.8 % (ref 0–1.5)
BUN BLD-MCNC: 11 MG/DL (ref 6–20)
BUN BLD-MCNC: 13 MG/DL (ref 6–20)
BUN BLD-MCNC: 13 MG/DL (ref 6–20)
BUN/CREAT SERPL: 12.4 (ref 7–25)
BUN/CREAT SERPL: 12.6 (ref 7–25)
BUN/CREAT SERPL: 12.6 (ref 7–25)
CALCIUM SPEC-SCNC: 8 MG/DL (ref 8.6–10.5)
CALCIUM SPEC-SCNC: 8.1 MG/DL (ref 8.6–10.5)
CALCIUM SPEC-SCNC: 8.6 MG/DL (ref 8.6–10.5)
CHLORIDE SERPL-SCNC: 102 MMOL/L (ref 98–107)
CHLORIDE SERPL-SCNC: 105 MMOL/L (ref 98–107)
CHLORIDE SERPL-SCNC: 107 MMOL/L (ref 98–107)
CO2 SERPL-SCNC: 24 MMOL/L (ref 22–29)
CO2 SERPL-SCNC: 24 MMOL/L (ref 22–29)
CO2 SERPL-SCNC: 25 MMOL/L (ref 22–29)
CREAT BLD-MCNC: 0.89 MG/DL (ref 0.76–1.27)
CREAT BLD-MCNC: 1.03 MG/DL (ref 0.76–1.27)
CREAT BLD-MCNC: 1.03 MG/DL (ref 0.76–1.27)
DEPRECATED RDW RBC AUTO: 47.3 FL (ref 37–54)
DEPRECATED RDW RBC AUTO: 49.1 FL (ref 37–54)
EOSINOPHIL # BLD AUTO: 0.06 10*3/MM3 (ref 0–0.4)
EOSINOPHIL NFR BLD AUTO: 0.7 % (ref 0.3–6.2)
ERYTHROCYTE [DISTWIDTH] IN BLOOD BY AUTOMATED COUNT: 14.2 % (ref 12.3–15.4)
ERYTHROCYTE [DISTWIDTH] IN BLOOD BY AUTOMATED COUNT: 14.2 % (ref 12.3–15.4)
GFR SERPL CREATININE-BSD FRML MDRD: 109 ML/MIN/1.73
GFR SERPL CREATININE-BSD FRML MDRD: 76 ML/MIN/1.73
GFR SERPL CREATININE-BSD FRML MDRD: 76 ML/MIN/1.73
GFR SERPL CREATININE-BSD FRML MDRD: 90 ML/MIN/1.73
GFR SERPL CREATININE-BSD FRML MDRD: 92 ML/MIN/1.73
GFR SERPL CREATININE-BSD FRML MDRD: 92 ML/MIN/1.73
GLUCOSE BLD-MCNC: 127 MG/DL (ref 65–99)
GLUCOSE BLD-MCNC: 132 MG/DL (ref 65–99)
GLUCOSE BLD-MCNC: 138 MG/DL (ref 65–99)
GLUCOSE BLDC GLUCOMTR-MCNC: 122 MG/DL (ref 70–130)
GLUCOSE BLDC GLUCOMTR-MCNC: 123 MG/DL (ref 70–130)
GLUCOSE BLDC GLUCOMTR-MCNC: 134 MG/DL (ref 70–130)
GLUCOSE BLDC GLUCOMTR-MCNC: 134 MG/DL (ref 70–130)
GLUCOSE BLDC GLUCOMTR-MCNC: 135 MG/DL (ref 70–130)
GLUCOSE BLDC GLUCOMTR-MCNC: 136 MG/DL (ref 70–130)
GLUCOSE BLDC GLUCOMTR-MCNC: 142 MG/DL (ref 70–130)
GLUCOSE BLDC GLUCOMTR-MCNC: 150 MG/DL (ref 70–130)
GLUCOSE BLDC GLUCOMTR-MCNC: 151 MG/DL (ref 70–130)
GLUCOSE BLDC GLUCOMTR-MCNC: 154 MG/DL (ref 70–130)
GLUCOSE BLDC GLUCOMTR-MCNC: 156 MG/DL (ref 70–130)
GLUCOSE BLDC GLUCOMTR-MCNC: 169 MG/DL (ref 70–130)
GLUCOSE BLDC GLUCOMTR-MCNC: 180 MG/DL (ref 70–130)
HCT VFR BLD AUTO: 29.4 % (ref 37.5–51)
HCT VFR BLD AUTO: 30.9 % (ref 37.5–51)
HGB BLD-MCNC: 10.5 G/DL (ref 13–17.7)
HGB BLD-MCNC: 9.7 G/DL (ref 13–17.7)
IMM GRANULOCYTES # BLD AUTO: 0.03 10*3/MM3 (ref 0–0.05)
IMM GRANULOCYTES NFR BLD AUTO: 0.4 % (ref 0–0.5)
INR PPP: 1.38 (ref 0.85–1.16)
LYMPHOCYTES # BLD AUTO: 1.74 10*3/MM3 (ref 0.7–3.1)
LYMPHOCYTES NFR BLD AUTO: 20.8 % (ref 19.6–45.3)
MAGNESIUM SERPL-MCNC: 2.1 MG/DL (ref 1.6–2.6)
MAGNESIUM SERPL-MCNC: 2.2 MG/DL (ref 1.6–2.6)
MAGNESIUM SERPL-MCNC: 2.2 MG/DL (ref 1.6–2.6)
MCH RBC QN AUTO: 31.1 PG (ref 26.6–33)
MCH RBC QN AUTO: 31.7 PG (ref 26.6–33)
MCHC RBC AUTO-ENTMCNC: 33 G/DL (ref 31.5–35.7)
MCHC RBC AUTO-ENTMCNC: 34 G/DL (ref 31.5–35.7)
MCV RBC AUTO: 93.4 FL (ref 79–97)
MCV RBC AUTO: 94.2 FL (ref 79–97)
MONOCYTES # BLD AUTO: 0.8 10*3/MM3 (ref 0.1–0.9)
MONOCYTES NFR BLD AUTO: 9.5 % (ref 5–12)
NEUTROPHILS # BLD AUTO: 5.68 10*3/MM3 (ref 1.7–7)
NEUTROPHILS NFR BLD AUTO: 67.8 % (ref 42.7–76)
NRBC BLD AUTO-RTO: 0 /100 WBC (ref 0–0.2)
PHOSPHATE SERPL-MCNC: 2.7 MG/DL (ref 2.5–4.5)
PHOSPHATE SERPL-MCNC: 4 MG/DL (ref 2.5–4.5)
PHOSPHATE SERPL-MCNC: 4 MG/DL (ref 2.5–4.5)
PLATELET # BLD AUTO: 127 10*3/MM3 (ref 140–450)
PLATELET # BLD AUTO: 134 10*3/MM3 (ref 140–450)
PMV BLD AUTO: 10.4 FL (ref 6–12)
PMV BLD AUTO: 10.7 FL (ref 6–12)
POTASSIUM BLD-SCNC: 4.1 MMOL/L (ref 3.5–5.2)
POTASSIUM BLD-SCNC: 4.1 MMOL/L (ref 3.5–5.2)
POTASSIUM BLD-SCNC: 4.3 MMOL/L (ref 3.5–5.2)
PROTHROMBIN TIME: 16.4 SECONDS (ref 11.2–14.3)
RBC # BLD AUTO: 3.12 10*6/MM3 (ref 4.14–5.8)
RBC # BLD AUTO: 3.31 10*6/MM3 (ref 4.14–5.8)
SODIUM BLD-SCNC: 135 MMOL/L (ref 136–145)
SODIUM BLD-SCNC: 141 MMOL/L (ref 136–145)
SODIUM BLD-SCNC: 143 MMOL/L (ref 136–145)
WBC NRBC COR # BLD: 8.38 10*3/MM3 (ref 3.4–10.8)
WBC NRBC COR # BLD: 9.04 10*3/MM3 (ref 3.4–10.8)

## 2020-02-06 PROCEDURE — 85610 PROTHROMBIN TIME: CPT | Performed by: PHYSICIAN ASSISTANT

## 2020-02-06 PROCEDURE — 83735 ASSAY OF MAGNESIUM: CPT | Performed by: PHYSICIAN ASSISTANT

## 2020-02-06 PROCEDURE — 83735 ASSAY OF MAGNESIUM: CPT | Performed by: THORACIC SURGERY (CARDIOTHORACIC VASCULAR SURGERY)

## 2020-02-06 PROCEDURE — 80069 RENAL FUNCTION PANEL: CPT | Performed by: PHYSICIAN ASSISTANT

## 2020-02-06 PROCEDURE — 82962 GLUCOSE BLOOD TEST: CPT

## 2020-02-06 PROCEDURE — 63710000001 INSULIN LISPRO (HUMAN) PER 5 UNITS: Performed by: THORACIC SURGERY (CARDIOTHORACIC VASCULAR SURGERY)

## 2020-02-06 PROCEDURE — 99232 SBSQ HOSP IP/OBS MODERATE 35: CPT | Performed by: INTERNAL MEDICINE

## 2020-02-06 PROCEDURE — 25010000002 MORPHINE PER 10 MG: Performed by: THORACIC SURGERY (CARDIOTHORACIC VASCULAR SURGERY)

## 2020-02-06 PROCEDURE — 85025 COMPLETE CBC W/AUTO DIFF WBC: CPT | Performed by: PHYSICIAN ASSISTANT

## 2020-02-06 PROCEDURE — 85027 COMPLETE CBC AUTOMATED: CPT | Performed by: THORACIC SURGERY (CARDIOTHORACIC VASCULAR SURGERY)

## 2020-02-06 PROCEDURE — 80069 RENAL FUNCTION PANEL: CPT | Performed by: THORACIC SURGERY (CARDIOTHORACIC VASCULAR SURGERY)

## 2020-02-06 PROCEDURE — 93010 ELECTROCARDIOGRAM REPORT: CPT | Performed by: INTERNAL MEDICINE

## 2020-02-06 PROCEDURE — 71045 X-RAY EXAM CHEST 1 VIEW: CPT

## 2020-02-06 PROCEDURE — 93005 ELECTROCARDIOGRAM TRACING: CPT | Performed by: PHYSICIAN ASSISTANT

## 2020-02-06 PROCEDURE — 25010000003 CEFUROXIME SODIUM 1.5 G RECONSTITUTED SOLUTION: Performed by: PHYSICIAN ASSISTANT

## 2020-02-06 PROCEDURE — 25010000002 DOBUTAMINE PER 250 MG: Performed by: PHYSICIAN ASSISTANT

## 2020-02-06 RX ORDER — ZOLPIDEM TARTRATE 10 MG/1
10 TABLET ORAL NIGHTLY PRN
COMMUNITY

## 2020-02-06 RX ORDER — MORPHINE SULFATE 2 MG/ML
2 INJECTION, SOLUTION INTRAMUSCULAR; INTRAVENOUS
Status: DISCONTINUED | OUTPATIENT
Start: 2020-02-06 | End: 2020-02-11 | Stop reason: HOSPADM

## 2020-02-06 RX ORDER — OXYCODONE HYDROCHLORIDE 5 MG/1
10 TABLET ORAL EVERY 4 HOURS PRN
Status: DISCONTINUED | OUTPATIENT
Start: 2020-02-06 | End: 2020-02-11 | Stop reason: HOSPADM

## 2020-02-06 RX ADMIN — OXYCODONE HYDROCHLORIDE AND ACETAMINOPHEN 2 TABLET: 5; 325 TABLET ORAL at 06:31

## 2020-02-06 RX ADMIN — MORPHINE SULFATE 2 MG: 2 INJECTION, SOLUTION INTRAMUSCULAR; INTRAVENOUS at 06:59

## 2020-02-06 RX ADMIN — MORPHINE SULFATE 2 MG: 2 INJECTION, SOLUTION INTRAMUSCULAR; INTRAVENOUS at 02:36

## 2020-02-06 RX ADMIN — NOREPINEPHRINE BITARTRATE 0.1 MCG/KG/MIN: 1 INJECTION, SOLUTION, CONCENTRATE INTRAVENOUS at 14:13

## 2020-02-06 RX ADMIN — CEFUROXIME SODIUM 1.5 G: 1.5 INJECTION, POWDER, FOR SOLUTION INTRAVENOUS at 10:06

## 2020-02-06 RX ADMIN — MORPHINE SULFATE 2 MG: 2 INJECTION, SOLUTION INTRAMUSCULAR; INTRAVENOUS at 19:55

## 2020-02-06 RX ADMIN — SENNOSIDES AND DOCUSATE SODIUM 2 TABLET: 8.6; 5 TABLET ORAL at 08:23

## 2020-02-06 RX ADMIN — MORPHINE SULFATE 2 MG: 2 INJECTION, SOLUTION INTRAMUSCULAR; INTRAVENOUS at 11:48

## 2020-02-06 RX ADMIN — OXYCODONE HYDROCHLORIDE 10 MG: 5 TABLET ORAL at 20:42

## 2020-02-06 RX ADMIN — CEFUROXIME SODIUM 1.5 G: 1.5 INJECTION, POWDER, FOR SOLUTION INTRAVENOUS at 02:09

## 2020-02-06 RX ADMIN — MORPHINE SULFATE 2 MG: 2 INJECTION, SOLUTION INTRAMUSCULAR; INTRAVENOUS at 22:55

## 2020-02-06 RX ADMIN — ASPIRIN 325 MG: 325 TABLET, DELAYED RELEASE ORAL at 08:23

## 2020-02-06 RX ADMIN — MORPHINE SULFATE 2 MG: 2 INJECTION, SOLUTION INTRAMUSCULAR; INTRAVENOUS at 08:24

## 2020-02-06 RX ADMIN — HYDROCODONE BITARTRATE AND ACETAMINOPHEN 1 TABLET: 7.5; 325 TABLET ORAL at 14:22

## 2020-02-06 RX ADMIN — NOREPINEPHRINE BITARTRATE 0.2 MCG/KG/MIN: 1 INJECTION, SOLUTION, CONCENTRATE INTRAVENOUS at 04:41

## 2020-02-06 RX ADMIN — OXYCODONE HYDROCHLORIDE AND ACETAMINOPHEN 2 TABLET: 5; 325 TABLET ORAL at 16:32

## 2020-02-06 RX ADMIN — HYDROCODONE BITARTRATE AND ACETAMINOPHEN 1 TABLET: 7.5; 325 TABLET ORAL at 08:23

## 2020-02-06 RX ADMIN — MORPHINE SULFATE 2 MG: 2 INJECTION, SOLUTION INTRAMUSCULAR; INTRAVENOUS at 05:05

## 2020-02-06 RX ADMIN — OXYCODONE HYDROCHLORIDE AND ACETAMINOPHEN 2 TABLET: 5; 325 TABLET ORAL at 02:32

## 2020-02-06 RX ADMIN — OXYCODONE HYDROCHLORIDE AND ACETAMINOPHEN 2 TABLET: 5; 325 TABLET ORAL at 11:47

## 2020-02-06 RX ADMIN — HYDROCODONE BITARTRATE AND ACETAMINOPHEN 1 TABLET: 7.5; 325 TABLET ORAL at 22:00

## 2020-02-06 RX ADMIN — INSULIN LISPRO 2 UNITS: 100 INJECTION, SOLUTION INTRAVENOUS; SUBCUTANEOUS at 18:17

## 2020-02-06 RX ADMIN — CEFUROXIME SODIUM 1.5 G: 1.5 INJECTION, POWDER, FOR SOLUTION INTRAVENOUS at 18:17

## 2020-02-06 RX ADMIN — SENNOSIDES AND DOCUSATE SODIUM 2 TABLET: 8.6; 5 TABLET ORAL at 20:01

## 2020-02-06 RX ADMIN — DOBUTAMINE HYDROCHLORIDE 2 MCG/KG/MIN: 100 INJECTION INTRAVENOUS at 06:58

## 2020-02-06 RX ADMIN — ATORVASTATIN CALCIUM 80 MG: 40 TABLET, FILM COATED ORAL at 20:01

## 2020-02-06 NOTE — PLAN OF CARE
Problem: Patient Care Overview  Goal: Plan of Care Review  Outcome: Ongoing (interventions implemented as appropriate)  Flowsheets (Taken 2020 1830)  Progress: improving  Outcome Summary: maxx surgery well. out on levo and primacor. volume given and drips titrated. weaned vent without problem. extubated at 1620. passed swallow test without problem. using precedex for anxiety. on insulin drip. bp labile post extubation. using volume and levo. pleasant mood.  Goal: Individualization and Mutuality  Outcome: Ongoing (interventions implemented as appropriate)  Goal: Discharge Needs Assessment  Outcome: Ongoing (interventions implemented as appropriate)  Goal: Interprofessional Rounds/Family Conf  Outcome: Ongoing (interventions implemented as appropriate)     Problem: Ventilation, Mechanical Invasive (Adult)  Description  Prevent and manage potential problems includin. artificial airway-induced skin/tissue breakdown  2. gastritis/stress ulcer  3. immobility  4. inability to wean  5. malnutrition  6. mechanical dysfunction  7. situational response  8. ventilator-induced lung injury  Goal: Signs and Symptoms of Listed Potential Problems Will be Absent, Minimized or Managed (Ventilation, Mechanical Invasive)  Description  Signs and symptoms of listed potential problems will be absent, minimized or managed by discharge/transition of care (reference Ventilation, Mechanical Invasive (Adult) CPG).  Outcome: Ongoing (interventions implemented as appropriate)     Problem: Cardiac Surgery (Adult)  Description  Prevent and manage potential problems includin. bleeding  2. bowel motility decreased  3. cardiac complications  4. functional deficit  5. hemodynamic instability  6. infection  7. neurologic complications  8. pain  9. postoperative nausea and vomiting  10. postoperative urinary retention  11. respiratory compromise  12. situational response  13. VTE (venous thromboembolism)  14. wound healing impaired  Goal:  Signs and Symptoms of Listed Potential Problems Will be Absent, Minimized or Managed (Cardiac Surgery)  Description  Signs and symptoms of listed potential problems will be absent, minimized or managed by discharge/transition of care (reference Cardiac Surgery (Adult) CPG).  Outcome: Ongoing (interventions implemented as appropriate)  Goal: Anesthesia/Sedation Recovery  Outcome: Ongoing (interventions implemented as appropriate)     Problem: Skin Injury Risk (Adult)  Goal: Identify Related Risk Factors and Signs and Symptoms  Description  Related risk factors and signs and symptoms are identified upon initiation of Human Response Clinical Practice Guideline (CPG).  Outcome: Ongoing (interventions implemented as appropriate)  Goal: Skin Health and Integrity  Description  Patient will demonstrate the desired outcomes by discharge/transition of care.  Outcome: Ongoing (interventions implemented as appropriate)

## 2020-02-06 NOTE — PROGRESS NOTES
Intensive Care Follow-up     Hospital:  LOS: 1 day   Mr. Ross Quintero, 54 y.o. male is followed for:   Atherosclerosis of native coronary artery of native heart   Status post CABG followed postoperatively for hyperglycemia and ventilator management.     Subjective   Interval History:  The chart has been reviewed.  The patient was extubated without difficulty.  Pain control has been good.  He remains on Levophed and Primacor and Dobutrex has been started and attempts to wean these others off.  He has been breathing without much difficulty.    The patient's past medical, surgical and social history were reviewed and updated in Epic as appropriate.        Objective     Infusions:    dextrose 5 % with KCl 20 mEq 30 mL/hr Last Rate: 30 mL/hr (02/05/20 1115)   DOBUTamine 2-20 mcg/kg/min Last Rate: 2 mcg/kg/min (02/06/20 0658)   insulin 0-50 Units/hr Last Rate: 3.5 Units/hr (02/06/20 0800)   milrinone 0.25-0.75 mcg/kg/min Last Rate: 0.5 mcg/kg/min (02/06/20 0800)   niCARdipine 5-15 mg/hr    nitroglycerin 5-200 mcg/min    norepinephrine 0.02-0.3 mcg/kg/min Last Rate: 0.17 mcg/kg/min (02/06/20 0800)   phenylephrine 0.5-3 mcg/kg/min      Medications:    aspirin 325 mg Oral Daily   atorvastatin 80 mg Oral Nightly   cefuroxime 1.5 g Intravenous Q8H   insulin lispro 0-9 Units Subcutaneous 4x Daily With Meals & Nightly   metoprolol tartrate 12.5 mg Oral Q12H   pharmacy consult - MTM  Does not apply Daily   sennosides-docusate 2 tablet Oral BID       Vital Sign Min/Max for last 24 hours  Temp  Min: 97.1 °F (36.2 °C)  Max: 100.7 °F (38.2 °C)   BP  Min: 72/52  Max: 119/69   Pulse  Min: 80  Max: 111   Resp  Min: 18  Max: 20   SpO2  Min: 90 %  Max: 100 %   Flow (L/min)  Min: 3  Max: 4       Input/Output for last 24 hour shift  02/05 0701 - 02/06 0700  In: 5301 [I.V.:4051]  Out: 6185 [Urine:5515]   FiO2 (%):  [40 %-100 %] 40 %  S RR:  [18] 18  PEEP/CPAP (cm H2O):  [5 cm H20-10 cm H20] 5 cm H20  ME SUP:  [0 cm H20-10 cm H20] 0 cm  H20  MAP (cm H2O):  [5-16] 5  Objective:  General Appearance:  Comfortable, well-appearing and in no acute distress.    Vital signs: (most recent): Blood pressure 94/54, pulse 101, temperature 100.3 °F (37.9 °C), temperature source Core, resp. rate 18, SpO2 95 %.    HEENT: (There is a right internal jugular introducer with PA catheter.)    Lungs:  Normal effort and normal respiratory rate.  Breath sounds clear to auscultation.  He is not in respiratory distress.  No wheezes.    Heart: Tachycardia.  Regular rhythm.  S1 normal.  No murmur or friction rub.   Chest: Symmetric chest wall expansion. (Status post median sternotomy.  Mediastinal tubes in place.)  Abdomen: Abdomen is soft and non-distended.  There is no abdominal tenderness.     Extremities: Normal range of motion.  There is no dependent edema.    Neurological: Patient is alert and oriented to person, place and time.    Pupils:  Pupils are equal, round, and reactive to light.  Pupils are equal.   Skin:  Warm.              Results from last 7 days   Lab Units 02/06/20 0319 02/06/20  0000 02/05/20 2002   WBC 10*3/mm3 8.38 9.04 11.07*   HEMOGLOBIN g/dL 9.7* 10.5* 11.1*   PLATELETS 10*3/mm3 127* 134* 140     Results from last 7 days   Lab Units 02/06/20 0319 02/06/20  0000 02/05/20 2002   SODIUM mmol/L 141 143 143   POTASSIUM mmol/L 4.1 4.1 4.4   CO2 mmol/L 24.0 25.0 25.0   BUN mg/dL 13 13 13   CREATININE mg/dL 1.03 1.03 0.93   MAGNESIUM mg/dL 2.2 2.2 2.3   PHOSPHORUS mg/dL 4.0 4.0 3.9   GLUCOSE mg/dL 132* 127* 140*     Estimated Creatinine Clearance: 99.9 mL/min (by C-G formula based on SCr of 1.03 mg/dL).    Results from last 7 days   Lab Units 02/05/20  1617   PH, ARTERIAL pH units 7.320*   PCO2, ARTERIAL mm Hg 53.3   PO2 ART mm Hg 80.9*         I reviewed the patient's results and images.     Assessment/Plan   Impression        Coronary artery disease status post coronary artery bypass grafting x4.    Chronic systolic CHF (congestive heart failure)  (CMS/[facility])    HTN (hypertension)  Postoperative hyperglycemia     Plan        Mobilize after [place] catheter is removed.  Wean pressors and inotropes as able.  Later today I will change the insulin drip over to sliding scale insulin as needed.  Pulmonary hygiene has been discussed.  Chest x-ray does show the lungs are grossly clear with no sign of atelectasis so he is actually doing a very good job with his deep breathing.  Follow-up labs and orders have been placed.    Plan of care and goals reviewed with mulitdisciplinary/antibiotic stewardship team during rounds.   I discussed the patient's findings and my recommendations with patient and nursing staff       Bryon Hamilton MD, [facility]  Pulmonology and Critical Care Medicine

## 2020-02-06 NOTE — PROGRESS NOTES
Multidisciplinary Rounds    Patient Name: Ross Quintero  Date of Encounter: 02/06/20 9:12 AM  MRN: [id number]  Admission date: 2/5/2020    Reason for visit: MDR. RD to continue to follow per protocol.     Additional information obtained during MDR:  S/P CABG x4 yesterday.  Extubated last night.  Passed post extubation swallow eval and clear liquid started. Continue to require multiple pressors.      GTT: Dobutamine 2mcg/kg/mmin, milrinone 0.5mcg/kg/min, Levo 0.17mcg/kg/min (18.3mcg/kg)     Current diet: Diet Clear Liquid; Cardiac  No active supplement orders      Evaluation of Received Nutrient/Fluid Intake:  Insufficient data       EMR reviewed     Intervention:  Follow treatment plan  Care plan reviewed  Advance die to full liquids while on current does of pressors (discussed during MDR)     Follow up:   Per protocol      Desire Leonard RD  9:12 AM  Time: 10min

## 2020-02-06 NOTE — PLAN OF CARE
Problem: Patient Care Overview  Goal: Plan of Care Review  Outcome: Ongoing (interventions implemented as appropriate)  Flowsheets (Taken 2/6/2020 1756)  Progress: improving  Plan of Care Reviewed With: patient; spouse  Note:   Neurologically intact. Currently on 3L NC with no complications. MTx2 draining adequate amount. Pulmonary toileting encouraged. NSR, labile pressures r/t hypovolemia, albumin administered. Pt currently on primacor d/t low EF. CI >2.3, fluid responsive. Pt on a moderate dose of levophed, unable to wean throughout night. SVR remains low. NG pulled, passed dysphagia screen. No c/o n/v, tolerating clears. [place] remains, adequate output. Afebrile, all incisions and dressing CDI. PRN morphine and percocet given for pain control. Insulin, levo, and primacor gtts infusing. Pt up in chair, tolerating well. No other concerns at this time, will continue to monitor.

## 2020-02-06 NOTE — PLAN OF CARE
Pt stable throughout the day. Weaned primacor, dobutrex, and weaning levo. SPB greater than 90 all day. [place] sepideh still in place, pt not yet ambulated. Pt complains of pain, working to get pain under control. Will continue to monitor.

## 2020-02-06 NOTE — PROGRESS NOTES
Ross Quintero  [id number]  1965     LOS: 1 day   Patient Care Team:  Saul Cowan MD as PCP - General (Internal Medicine)  Darinel Martinez MD as Consulting Physician (Cardiology)  Darinel Pardo MD as Surgeon (Cardiothoracic Surgery)    Chief Complaint: Coronary artery disease      Subjective: No complaints, sitting in chair    Objective:     Vital Sign Min/Max for last 24 hours  Temp  Min: 97.1 °F (36.2 °C)  Max: 100.7 °F (38.2 °C)   BP  Min: 72/52  Max: 110/71   Pulse  Min: 80  Max: 97   Resp  Min: 18  Max: 20   SpO2  Min: 90 %  Max: 100 %   No data recorded   No data recorded         Physical Exam:    Wound: Satisfactory    Pulses:     Mediastinal and Chest Tube Drainage:       Results Review:   Results from last 7 days   Lab Units 02/06/20  0319   WBC 10*3/mm3 8.38   HEMOGLOBIN g/dL 9.7*   HEMATOCRIT % 29.4*   PLATELETS 10*3/mm3 127*     Results from last 7 days   Lab Units 02/06/20  0319   SODIUM mmol/L 141   POTASSIUM mmol/L 4.1   CHLORIDE mmol/L 105   CO2 mmol/L 24.0   BUN mg/dL 13   CREATININE mg/dL 1.03   GLUCOSE mg/dL 132*   CALCIUM mg/dL 8.0*     Results from last 7 days   Lab Units 02/05/20  1617   PH, ARTERIAL pH units 7.320*   PO2 ART mm Hg 80.9*   PCO2, ARTERIAL mm Hg 53.3   HCO3 ART mmol/L 27.4*         Assessment      Coronary artery disease status post coronary artery bypass grafting x4.    Chronic systolic CHF (congestive heart failure) (CMS/[facility])    HTN (hypertension)      Doing satisfactory.  Wean Primacor and Levophed.  Start Dobutrex        Darinel Pardo MD  02/06/20  6:45 AM      Please note that portions of this note were completed with a voice recognition program. Efforts were made to edit the dictations, but words may be mistranscribed

## 2020-02-06 NOTE — PROGRESS NOTES
[facility] at [facility]   Inpatient Progress Note       LOS: 1 day   Patient Care Team:  Saul Cowan MD as PCP - General (Internal Medicine)  Darinel Martinez MD as Consulting Physician (Cardiology)  Darinel Pardo MD as Surgeon (Cardiothoracic Surgery)    Chief Complaint:  Follow-up for CHF and ICM    Subjective     Interval History:   Patient up in chair. Has usual post operative pleuritic complaints. Feels a little swollen.  Overall feels well      Review of Systems:   Pertinent positives noted in history, exam, and assessment. Otherwise reviewed and negative.      Objective     Vitals:  Blood pressure 101/50, pulse 111, temperature 100.3 °F (37.9 °C), temperature source Core, resp. rate 18, SpO2 94 %.     Intake/Output Summary (Last 24 hours) at 2/6/2020 1007  Last data filed at 2/6/2020 0800  Gross per 24 hour   Intake 5661 ml   Output 6155 ml   Net -494 ml     Physical Exam   Constitutional: He is oriented to person, place, and time. He appears well-developed and well-nourished.   Neck: No JVD present. No tracheal deviation present.   RIJ line in place. Dressing CDI   Cardiovascular: Normal rate, regular rhythm, normal heart sounds and intact distal pulses. Exam reveals no friction rub.   No murmur heard.  Pulmonary/Chest: Effort normal.   Decreased bases   Abdominal: Soft. Bowel sounds are normal. There is no tenderness.   Musculoskeletal: He exhibits no edema or deformity.   Neurological: He is alert and oriented to person, place, and time.   Skin: Skin is warm and dry.     I have examined the patient and agree with the above       Results Review:     I reviewed the patient's new clinical results.    Results from last 7 days   Lab Units 02/06/20  0319   WBC 10*3/mm3 8.38   HEMOGLOBIN g/dL 9.7*   HEMATOCRIT % 29.4*   PLATELETS 10*3/mm3 127*     Results from last 7 days   Lab Units 02/06/20  0319  02/04/20  1535   SODIUM mmol/L 141   < > 139   POTASSIUM mmol/L 4.1   < > 4.2    CHLORIDE mmol/L 105   < > 101   CO2 mmol/L 24.0   < > 25.0   BUN mg/dL 13   < > 12   CREATININE mg/dL 1.03   < > 0.86   CALCIUM mg/dL 8.0*   < > 9.3   BILIRUBIN mg/dL  --   --  0.7   ALK PHOS U/L  --   --  83   ALT (SGPT) U/L  --   --  19   AST (SGOT) U/L  --   --  19   GLUCOSE mg/dL 132*   < > 84    < > = values in this interval not displayed.     Results from last 7 days   Lab Units 02/06/20  0319   SODIUM mmol/L 141   POTASSIUM mmol/L 4.1   CHLORIDE mmol/L 105   CO2 mmol/L 24.0   BUN mg/dL 13   CREATININE mg/dL 1.03   GLUCOSE mg/dL 132*   CALCIUM mg/dL 8.0*     Results from last 7 days   Lab Units 02/06/20  0319 02/05/20  1134 02/04/20  1535   INR  1.38* 1.47* 1.09     No results found for: TROPONINI                  Tele:  SR    Assessment/Plan       Coronary artery disease status post coronary artery bypass grafting x4.    Chronic systolic CHF (congestive heart failure) (CMS/HCC)    HTN (hypertension)      1. Ischemic CM/MVCAD  - LVEF 25% pre-op   - CABG x4, POD 0  - continue support      2. SHF  - will need to resume Entresto, BB when BP allows for SHF       3. HLD  -   - Lipitor to 80mg     Plan:  · Wean dobutamine and milrinone.  As cardiac outputs are greater than 6 at this time.  · Continue other current medications.  Hold beta-blocker for now until off inotropes    GRACE Pink   Dictated utilizing Dragon dictation  I have seen and examined the patient, I have reviewed the note, discussed the case with the advance practice clinician, made necessary changes and I agree with the final note.    Darinel Martinez MD  02/06/20  10:36 AM

## 2020-02-06 NOTE — PROGRESS NOTES
Discharge Planning Assessment  [facility]     Patient Name: Ross Quintero  MRN: [id number]  Today's Date: 2/6/2020    Admit Date: 2/5/2020    Discharge Needs Assessment     Row Name 02/06/20 1219       Living Environment    Lives With  spouse;child(nicolle), adult    Name(s) of Who Lives With Patient  wife and three adult daughters     Current Living Arrangements  home/apartment/condo    Primary Care Provided by  self    Provides Primary Care For  no one    Family Caregiver if Needed  spouse    Quality of Family Relationships  helpful;involved;supportive    Able to Return to Prior Arrangements  yes       Resource/Environmental Concerns    Resource/Environmental Concerns  none       Transition Planning    Patient/Family Anticipates Transition to  home with family    Patient/Family Anticipated Services at Transition  rehabilitation services Cardiac rehab is following    Transportation Anticipated  family or friend will provide       Discharge Needs Assessment    Readmission Within the Last 30 Days  no previous admission in last 30 days    Concerns to be Addressed  denies needs/concerns at this time;discharge planning    Equipment Currently Used at Home  none    Equipment Needed After Discharge  none    Outpatient/Agency/Support Group Needs  outpatient therapy Cardiac Rehab is following    Discharge Coordination/Progress  Pt lives with his wife and adult daughters in [place].  Pt. did not use DME or home health services prior to admission.  Pt's wife can assist at discharge and will provide transportation.  Pt denies needs at this time.        Discharge Plan     Row Name 02/06/20 1222       Plan    Plan  Cardiac Rehab in [place]    Plan Comments  SW met with pt and his wife.  Pt. is hopeful that he will do well enough at discharge to only need outpatient therapy services. He is aware of cardiac rehab programming it seems and prefers therapy in [place]. Case management will continue to follow.           Destination      Coordination has not been started for this encounter.      Durable Medical Equipment      Coordination has not been started for this encounter.      Dialysis/Infusion      Coordination has not been started for this encounter.      Home Medical Care      Coordination has not been started for this encounter.      Therapy      Coordination has not been started for this encounter.      Community Resources      Coordination has not been started for this encounter.          Demographic Summary     Row Name 02/06/20 1218       General Information    Admission Type  inpatient    Arrived From  operating room    Referral Source  nursing    Reason for Consult  discharge planning    Preferred Language  English     Used During This Interaction  no    General Information Comments  PCP is Saul Cowan        Functional Status     Row Name 02/06/20 1219       Functional Status, IADL    IADL Comments  Pt was independent with ADLs prior to admission.         Employment/    Employment/ Comments  Pt. has insurance and prescription coverage through [employer].        Psychosocial    No documentation.       Abuse/Neglect    No documentation.       Legal    No documentation.       Substance Abuse    No documentation.       Patient Forms    No documentation.           ANTONIO Castillo

## 2020-02-07 ENCOUNTER — APPOINTMENT (OUTPATIENT)
Dept: GENERAL RADIOLOGY | Facility: HOSPITAL | Age: 55
End: 2020-02-07

## 2020-02-07 LAB
ANION GAP SERPL CALCULATED.3IONS-SCNC: 9 MMOL/L (ref 5–15)
BUN BLD-MCNC: 10 MG/DL (ref 6–20)
BUN/CREAT SERPL: 11.2 (ref 7–25)
CALCIUM SPEC-SCNC: 8.6 MG/DL (ref 8.6–10.5)
CHLORIDE SERPL-SCNC: 103 MMOL/L (ref 98–107)
CO2 SERPL-SCNC: 24 MMOL/L (ref 22–29)
CREAT BLD-MCNC: 0.89 MG/DL (ref 0.76–1.27)
DEPRECATED RDW RBC AUTO: 52.2 FL (ref 37–54)
ERYTHROCYTE [DISTWIDTH] IN BLOOD BY AUTOMATED COUNT: 14.6 % (ref 12.3–15.4)
GFR SERPL CREATININE-BSD FRML MDRD: 109 ML/MIN/1.73
GFR SERPL CREATININE-BSD FRML MDRD: 90 ML/MIN/1.73
GLUCOSE BLD-MCNC: 126 MG/DL (ref 65–99)
GLUCOSE BLDC GLUCOMTR-MCNC: 107 MG/DL (ref 70–130)
GLUCOSE BLDC GLUCOMTR-MCNC: 118 MG/DL (ref 70–130)
GLUCOSE BLDC GLUCOMTR-MCNC: 127 MG/DL (ref 70–130)
GLUCOSE BLDC GLUCOMTR-MCNC: 165 MG/DL (ref 70–130)
HCT VFR BLD AUTO: 30.1 % (ref 37.5–51)
HGB BLD-MCNC: 9.7 G/DL (ref 13–17.7)
MCH RBC QN AUTO: 31.3 PG (ref 26.6–33)
MCHC RBC AUTO-ENTMCNC: 32.2 G/DL (ref 31.5–35.7)
MCV RBC AUTO: 97.1 FL (ref 79–97)
PLATELET # BLD AUTO: 99 10*3/MM3 (ref 140–450)
PMV BLD AUTO: 10.7 FL (ref 6–12)
POTASSIUM BLD-SCNC: 4.5 MMOL/L (ref 3.5–5.2)
RBC # BLD AUTO: 3.1 10*6/MM3 (ref 4.14–5.8)
SODIUM BLD-SCNC: 136 MMOL/L (ref 136–145)
WBC NRBC COR # BLD: 9.38 10*3/MM3 (ref 3.4–10.8)

## 2020-02-07 PROCEDURE — 25010000003 CEFUROXIME SODIUM 1.5 G RECONSTITUTED SOLUTION: Performed by: PHYSICIAN ASSISTANT

## 2020-02-07 PROCEDURE — 93010 ELECTROCARDIOGRAM REPORT: CPT | Performed by: INTERNAL MEDICINE

## 2020-02-07 PROCEDURE — 80048 BASIC METABOLIC PNL TOTAL CA: CPT | Performed by: PHYSICIAN ASSISTANT

## 2020-02-07 PROCEDURE — 99232 SBSQ HOSP IP/OBS MODERATE 35: CPT | Performed by: INTERNAL MEDICINE

## 2020-02-07 PROCEDURE — 93005 ELECTROCARDIOGRAM TRACING: CPT | Performed by: THORACIC SURGERY (CARDIOTHORACIC VASCULAR SURGERY)

## 2020-02-07 PROCEDURE — 93005 ELECTROCARDIOGRAM TRACING: CPT | Performed by: INTERNAL MEDICINE

## 2020-02-07 PROCEDURE — 82962 GLUCOSE BLOOD TEST: CPT

## 2020-02-07 PROCEDURE — 85027 COMPLETE CBC AUTOMATED: CPT | Performed by: PHYSICIAN ASSISTANT

## 2020-02-07 PROCEDURE — 97161 PT EVAL LOW COMPLEX 20 MIN: CPT

## 2020-02-07 PROCEDURE — 25010000002 MORPHINE PER 10 MG: Performed by: THORACIC SURGERY (CARDIOTHORACIC VASCULAR SURGERY)

## 2020-02-07 PROCEDURE — 93005 ELECTROCARDIOGRAM TRACING: CPT | Performed by: PHYSICIAN ASSISTANT

## 2020-02-07 PROCEDURE — 71045 X-RAY EXAM CHEST 1 VIEW: CPT

## 2020-02-07 RX ORDER — NOREPINEPHRINE BIT/0.9 % NACL 8 MG/250ML
INFUSION BOTTLE (ML) INTRAVENOUS
Status: COMPLETED
Start: 2020-02-07 | End: 2020-02-07

## 2020-02-07 RX ORDER — NOREPINEPHRINE BIT/0.9 % NACL 8 MG/250ML
.02-.3 INFUSION BOTTLE (ML) INTRAVENOUS
Status: DISCONTINUED | OUTPATIENT
Start: 2020-02-07 | End: 2020-02-09

## 2020-02-07 RX ORDER — CARVEDILOL 6.25 MG/1
6.25 TABLET ORAL EVERY 12 HOURS SCHEDULED
Status: DISCONTINUED | OUTPATIENT
Start: 2020-02-07 | End: 2020-02-09

## 2020-02-07 RX ADMIN — CEFUROXIME SODIUM 1.5 G: 1.5 INJECTION, POWDER, FOR SOLUTION INTRAVENOUS at 00:37

## 2020-02-07 RX ADMIN — HYDROCODONE BITARTRATE AND ACETAMINOPHEN 1 TABLET: 7.5; 325 TABLET ORAL at 06:21

## 2020-02-07 RX ADMIN — MORPHINE SULFATE 2 MG: 2 INJECTION, SOLUTION INTRAMUSCULAR; INTRAVENOUS at 19:50

## 2020-02-07 RX ADMIN — ATORVASTATIN CALCIUM 80 MG: 40 TABLET, FILM COATED ORAL at 20:40

## 2020-02-07 RX ADMIN — OXYCODONE HYDROCHLORIDE 10 MG: 5 TABLET ORAL at 14:21

## 2020-02-07 RX ADMIN — MORPHINE SULFATE 2 MG: 2 INJECTION, SOLUTION INTRAMUSCULAR; INTRAVENOUS at 11:09

## 2020-02-07 RX ADMIN — METOPROLOL TARTRATE 12.5 MG: 25 TABLET, FILM COATED ORAL at 08:12

## 2020-02-07 RX ADMIN — SENNOSIDES AND DOCUSATE SODIUM 2 TABLET: 8.6; 5 TABLET ORAL at 20:40

## 2020-02-07 RX ADMIN — NOREPINEPHRINE BITARTRATE 0.02 MCG/KG/MIN: 1 INJECTION, SOLUTION, CONCENTRATE INTRAVENOUS at 17:21

## 2020-02-07 RX ADMIN — MORPHINE SULFATE 2 MG: 2 INJECTION, SOLUTION INTRAMUSCULAR; INTRAVENOUS at 00:37

## 2020-02-07 RX ADMIN — Medication 0.02 MCG/KG/MIN: at 17:21

## 2020-02-07 RX ADMIN — OXYCODONE HYDROCHLORIDE 10 MG: 5 TABLET ORAL at 00:37

## 2020-02-07 RX ADMIN — HYDROCODONE BITARTRATE AND ACETAMINOPHEN 1 TABLET: 7.5; 325 TABLET ORAL at 19:50

## 2020-02-07 RX ADMIN — SENNOSIDES AND DOCUSATE SODIUM 2 TABLET: 8.6; 5 TABLET ORAL at 08:12

## 2020-02-07 RX ADMIN — SODIUM CHLORIDE 250 ML: 9 INJECTION, SOLUTION INTRAVENOUS at 17:17

## 2020-02-07 RX ADMIN — ASPIRIN 325 MG: 325 TABLET, DELAYED RELEASE ORAL at 08:13

## 2020-02-07 RX ADMIN — HYDROCODONE BITARTRATE AND ACETAMINOPHEN 1 TABLET: 7.5; 325 TABLET ORAL at 11:09

## 2020-02-07 RX ADMIN — OXYCODONE HYDROCHLORIDE 10 MG: 5 TABLET ORAL at 08:12

## 2020-02-07 NOTE — PROGRESS NOTES
"Ross Quintero  [id number]  1965     LOS: 2 days   Patient Care Team:  Saul Cowan MD as PCP - General (Internal Medicine)  Darinel Martinez MD as Consulting Physician (Cardiology)  Darinel Pardo MD as Surgeon (Cardiothoracic Surgery)    Chief Complaint: Coronary artery disease      Subjective: No specific complaints    Objective:     Vital Sign Min/Max for last 24 hours  Temp  Min: 98.6 °F (37 °C)  Max: 100.3 °F (37.9 °C)   BP  Min: 86/56  Max: 119/69   Pulse  Min: 85  Max: 111   Resp  Min: 18  Max: 22   SpO2  Min: 91 %  Max: 99 %   No data recorded   Weight  Min: 107 kg (236 lb 15.9 oz)  Max: 107 kg (236 lb 15.9 oz)     Flowsheet Rows      First Filed Value   Admission Height  172.7 cm (67.99\") Documented at 02/06/2020 2321   Admission Weight  107 kg (236 lb 15.9 oz) Documented at 02/06/2020 2321          Physical Exam:    Wound: Satisfactory    Pulses:     Mediastinal and Chest Tube Drainage:       Results Review:   Results from last 7 days   Lab Units 02/07/20  0342   WBC 10*3/mm3 9.38   HEMOGLOBIN g/dL 9.7*   HEMATOCRIT % 30.1*   PLATELETS 10*3/mm3 99*     Results from last 7 days   Lab Units 02/07/20  0342   SODIUM mmol/L 136   POTASSIUM mmol/L 4.5   CHLORIDE mmol/L 103   CO2 mmol/L 24.0   BUN mg/dL 10   CREATININE mg/dL 0.89   GLUCOSE mg/dL 126*   CALCIUM mg/dL 8.6     Results from last 7 days   Lab Units 02/05/20  1617   PH, ARTERIAL pH units 7.320*   PO2 ART mm Hg 80.9*   PCO2, ARTERIAL mm Hg 53.3   HCO3 ART mmol/L 27.4*         Assessment      Coronary artery disease status post coronary artery bypass grafting x4.    Chronic systolic CHF (congestive heart failure) (CMS/HCC)    HTN (hypertension)      DC [place] catheter and MTs, keep in ICU 1 more day        Darinel Pardo MD  02/07/20  6:46 AM      Please note that portions of this note were completed with a voice recognition program. Efforts were made to edit the dictations, but words may be mistranscribed  "

## 2020-02-07 NOTE — PROGRESS NOTES
"Intensive Care Follow-up     Hospital:  LOS: 2 days   Mr. Ross Quintero, 54 y.o. male is followed for:   Atherosclerosis of native coronary artery of native heart   Followed post CABG for medical management including hyperglycemia.     Subjective   Interval History:  The chart has been reviewed.  The patient has had his tubes removed and he states that he is breathing much better.  Pressor was off since early this morning.  He has been off inotropes since yesterday.  He states that his pain is still significant but has gotten better since his tubes were removed.    The patient's past medical, surgical and social history were reviewed and updated in Epic as appropriate.        Objective     Infusions:    dextrose 5 % with KCl 20 mEq 30 mL/hr Last Rate: 30 mL/hr (02/05/20 1115)   DOBUTamine 2-20 mcg/kg/min Last Rate: 2 mcg/kg/min (02/06/20 0658)   niCARdipine 5-15 mg/hr    nitroglycerin 5-200 mcg/min    norepinephrine 0.02-0.3 mcg/kg/min Last Rate: Stopped (02/07/20 0532)   phenylephrine 0.5-3 mcg/kg/min      Medications:    aspirin 325 mg Oral Daily   atorvastatin 80 mg Oral Nightly   carvedilol 6.25 mg Oral Q12H   pharmacy consult - MTM  Does not apply Daily   sennosides-docusate 2 tablet Oral BID       Vital Sign Min/Max for last 24 hours  Temp  Min: 98.3 °F (36.8 °C)  Max: 99.7 °F (37.6 °C)   BP  Min: 91/69  Max: 118/66   Pulse  Min: 85  Max: 101   Resp  Min: 20  Max: 22   SpO2  Min: 89 %  Max: 99 %   Flow (L/min)  Min: 3  Max: 3       Input/Output for last 24 hour shift  02/06 0701 - 02/07 0700  In: 2246 [P.O.:900; I.V.:1296]  Out: 1925 [Urine:1625]      Objective:  General Appearance:  Comfortable, well-appearing and in no acute distress.    Vital signs: (most recent): Blood pressure 96/57, pulse 92, temperature 98.3 °F (36.8 °C), temperature source Oral, resp. rate 20, height 172.7 cm (67.99\"), weight 107 kg (236 lb 15.9 oz), SpO2 93 %.    HEENT: (There is a right internal jugular introducer)    Lungs:  " Normal effort and normal respiratory rate.  Breath sounds clear to auscultation.  He is not in respiratory distress.  No wheezes.    Heart: Tachycardia.  Regular rhythm.  S1 normal.  No murmur or friction rub.   Chest: Symmetric chest wall expansion. (Status post median sternotomy. )  Abdomen: Abdomen is soft and non-distended.  There is no abdominal tenderness.     Extremities: Normal range of motion.  There is no dependent edema.    Neurological: Patient is alert and oriented to person, place and time.    Pupils:  Pupils are equal, round, and reactive to light.  Pupils are equal.   Skin:  Warm.              Results from last 7 days   Lab Units 02/07/20  0342 02/06/20  0319 02/06/20  0000   WBC 10*3/mm3 9.38 8.38 9.04   HEMOGLOBIN g/dL 9.7* 9.7* 10.5*   PLATELETS 10*3/mm3 99* 127* 134*     Results from last 7 days   Lab Units 02/07/20  0342 02/06/20  2259 02/06/20  0319 02/06/20  0000   SODIUM mmol/L 136 135* 141 143   POTASSIUM mmol/L 4.5 4.3 4.1 4.1   CO2 mmol/L 24.0 24.0 24.0 25.0   BUN mg/dL 10 11 13 13   CREATININE mg/dL 0.89 0.89 1.03 1.03   MAGNESIUM mg/dL  --  2.1 2.2 2.2   PHOSPHORUS mg/dL  --  2.7 4.0 4.0   GLUCOSE mg/dL 126* 138* 132* 127*     Estimated Creatinine Clearance: 115.6 mL/min (by C-G formula based on SCr of 0.89 mg/dL).    Results from last 7 days   Lab Units 02/05/20  1617   PH, ARTERIAL pH units 7.320*   PCO2, ARTERIAL mm Hg 53.3   PO2 ART mm Hg 80.9*         I reviewed the patient's results and images.     Assessment/Plan   Impression        Coronary artery disease status post coronary artery bypass grafting x4.    Chronic systolic CHF (congestive heart failure) (CMS/HCC)    HTN (hypertension)       Plan        We will watch for any need for further pressor support.  Mobilize as tolerated now the tubes are out.  Pulmonary hygiene has been encouraged.  Follow-up labs and orders have been placed.    Plan of care and goals reviewed with mulitdisciplinary/antibiotic stewardship team during  rounds.   I discussed the patient's findings and my recommendations with patient and nursing staff       Bryon Hamilton MD, [facility]  Pulmonology and Critical Care Medicine

## 2020-02-07 NOTE — PROGRESS NOTES
Multidisciplinary Rounds    Time: 20min  Patient Name: Ross Quintero  Date of Encounter: 02/07/20 9:17 AM  MRN: [id number]  Admission date: 2/5/2020      Reason for visit: MDR. RD to continue to follow per protocol.     Additional information obtained during MDR: OK to advance pt's diet to solid foods, RN took food preferences for the next 3 days, RD placed orders in [facility] for diet office.     Current diet: Diet Clear Liquid; Cardiac  Diet Regular; Cardiac      Intervention:  Follow treatment plan  Care plan reviewed    Follow up:   Per protocol      Joana Correia MS RD/PORTER CNSC  9:17 AM

## 2020-02-07 NOTE — THERAPY EVALUATION
Patient Name: Ross Quintero  : 1965    MRN: [id number]                              Today's Date: 2020       Admit Date: 2020    Visit Dx:     ICD-10-CM ICD-9-CM   1. Atherosclerosis of native coronary artery of native heart, angina presence unspecified I25.10 414.01     Patient Active Problem List   Diagnosis   • Abnormal echocardiogram   • Angina at rest (CMS/[facility])   • Coronary artery disease status post coronary artery bypass grafting x4.   • Chronic systolic CHF (congestive heart failure) (CMS/[facility])   • HTN (hypertension)     Past Medical History:   Diagnosis Date   • Cardiomyopathy (CMS/HCC)    • CHF (congestive heart failure) (CMS/[facility])    • Hyperlipidemia    • Hypertension    • Tobacco abuse      Past Surgical History:   Procedure Laterality Date   • CARDIAC CATHETERIZATION N/A 2020    Procedure: Left Heart Cath;  Surgeon: Darinel Martinez MD;  Location:  [email or web address] CATH INVASIVE LOCATION;  Service: Cardiovascular   • CARDIAC CATHETERIZATION  2020   • CORONARY ARTERY BYPASS GRAFT N/A 2020    Procedure: MEDIAN STERNOTOMY, CORONARY ARTERY BYPASS X 4 WITH LEFT INTERNAL MAMMARY ARTERY GRAFT, ENDOSCOPIC VEIN HARVEST OF RIGHT GREATER SAPHENOUS VEIN;  Surgeon: Darinel Pardo MD;  Location:  [facility];  Service: Cardiothoracic;  Laterality: N/A;  VEIN OUT: 0832  VEIN READY: 0843     • LASIK     • VASECTOMY      STATES RECEIVED GENERAL ANESTHESIA FOR THIS   • WISDOM TOOTH EXTRACTION       General Information     Row Name 20 105          PT Evaluation Time/Intention    Document Type  evaluation  -SIMEON     Mode of Treatment  physical therapy  -SIMEON     Row Name 20 1055          General Information    Patient Profile Reviewed?  yes  -SIMEON     Prior Level of Function  independent:;gait;transfer;bed mobility;ADL's  -SIMEON     Existing Precautions/Restrictions  cardiac;oxygen therapy device and L/min;sternal  -SIMEON     Barriers to Rehab  none identified  -SIMEON     Row Name 20 2375           Relationship/Environment    Lives With  spouse  -SIMEON     Row Name 02/07/20 1054          Resource/Environmental Concerns    Current Living Arrangements  home/apartment/condo  -SIMEON     Row Name 02/07/20 1054          Cognitive Assessment/Intervention- PT/OT    Orientation Status (Cognition)  oriented x 4  -SIMEON     Row Name 02/07/20 1054          Safety Issues, Functional Mobility    Safety Issues Affecting Function (Mobility)  safety precautions follow-through/compliance;safety precaution awareness  -SIMEON     Impairments Affecting Function (Mobility)  balance;endurance/activity tolerance;shortness of breath  -SIMEON       User Key  (r) = Recorded By, (t) = Taken By, (c) = Cosigned By    Initials Name Provider Type    Alia Narvaez PT Physical Therapist        Mobility     Row Name 02/07/20 1055          Bed Mobility Assessment/Treatment    Comment (Bed Mobility)  patient is OOB and returns to the chair  -SIMEON     Row Name 02/07/20 1055          Transfer Assessment/Treatment    Comment (Transfers)  cues for sternal precautions  -SIMEON     Row Name 02/07/20 1055          Bed-Chair Transfer    Bed-Chair [place] (Transfers)  contact guard  -SIMEON     Row Name 02/07/20 1055          Sit-Stand Transfer    Sit-Stand [place] (Transfers)  contact guard  -SIMEON     Row Name 02/07/20 1055          Gait/Stairs Assessment/Training    Gait/Stairs Assessment/Training  gait/ambulation independence  -SIMEON     [place] Level (Gait)  contact guard  -SIMEON     Distance in Feet (Gait)  220  -SIMEON     Comment (Gait/Stairs)  patient had good gait pattern he felt better with ambulation  -SIMEON       User Key  (r) = Recorded By, (t) = Taken By, (c) = Cosigned By    Initials Name Provider Type    Alia Narvaez PT Physical Therapist        Obj/Interventions     Row Name 02/07/20 1057          General ROM    GENERAL ROM COMMENTS  no ROM deficits  -SIMEON     Row Name 02/07/20 1057          MMT (Manual Muscle Testing)    General MMT Comments   BLE are WFL  -SIMEON     Row Name 02/07/20 1057          Therapeutic Exercise    Lower Extremity Range of Motion (Therapeutic Exercise)  hip flexion/extension, bilateral;knee flexion/extension, bilateral;ankle dorsiflexion/plantar flexion, bilateral  -SIMEON     Exercise Type (Therapeutic Exercise)  AROM (active range of motion)  -SIMEON     Position (Therapeutic Exercise)  seated  -SIMEON     Sets/Reps (Therapeutic Exercise)  1/10  -SIMEON     Expected Outcome (Therapeutic Exercise)  facilitate normal movement patterns;improve functional tolerance, self-care activity;improve functional stability  -SIMEON     Row Name 02/07/20 1057          Static Sitting Balance    Level of [place] (Unsupported Sitting, Static Balance)  independent  -SIMEON     Sitting Position (Unsupported Sitting, Static Balance)  sitting in chair  -SIMEON     Row Name 02/07/20 1057          Dynamic Sitting Balance    Level of [place], Reaches Outside Midline (Sitting, Dynamic Balance)  independent  -SIMEON     Sitting Position, Reaches Outside Midline (Sitting, Dynamic Balance)  sitting in chair  -SIMEON     Row Name 02/07/20 1057          Static Standing Balance    Level of [place] (Supported Standing, Static Balance)  contact guard assist  -SIMEON     Row Name 02/07/20 1057          Dynamic Standing Balance    Level of [place], Reaches Outside Midline (Standing, Dynamic Balance)  contact guard assist  -SIMEON       User Key  (r) = Recorded By, (t) = Taken By, (c) = Cosigned By    Initials Name Provider Type    Alia Narvaez, PT Physical Therapist        Goals/Plan     Row Name 02/07/20 1101          Bed Mobility Goal 1 (PT)    Activity/Assistive Device (Bed Mobility Goal 1, PT)  sit to supine/supine to sit  -SIMEON     [place] Level/Cues Needed (Bed Mobility Goal 1, PT)  independent  -SIMEON     Time Frame (Bed Mobility Goal 1, PT)  long term goal (LTG);10 days  -SIMEON     Progress/Outcomes (Bed Mobility Goal 1, PT)  goal ongoing  -SIMEON     Row Name 02/07/20 1101           Transfer Goal 1 (PT)    Activity/Assistive Device (Transfer Goal 1, PT)  sit-to-stand/stand-to-sit  -SIMEON     [place] Level/Cues Needed (Transfer Goal 1, PT)  independent  -SIMEON     Time Frame (Transfer Goal 1, PT)  long term goal (LTG);10 days  -SIMEON     Progress/Outcome (Transfer Goal 1, PT)  goal ongoing  -SIMEON     Row Name 02/07/20 1101          Gait Training Goal 1 (PT)    Activity/Assistive Device (Gait Training Goal 1, PT)  gait (walking locomotion)  -SIMEON     [place] Level (Gait Training Goal 1, PT)  independent  -SIMEON     Distance (Gait Goal 1, PT)  400  -SIMEON     Time Frame (Gait Training Goal 1, PT)  long term goal (LTG);10 days  -SIMEON     Progress/Outcome (Gait Training Goal 1, PT)  goal ongoing  -SIMEON     Row Name 02/07/20 1101          Patient Education Goal (PT)    Activity (Patient Education Goal, PT)  HEP  -SIMEON     [place]/Cues/Accuracy (Memory Goal 2, PT)  verbalizes understanding  -SIMEON     Time Frame (Patient Education Goal, PT)  long term goal (LTG);10 days  -SIMEON     Progress/Outcome (Patient Education Goal, PT)  goal ongoing  -SIMEON       User Key  (r) = Recorded By, (t) = Taken By, (c) = Cosigned By    Initials Name Provider Type    Alia Narvaez, PT Physical Therapist        Clinical Impression     Row Name 02/07/20 1058          Pain Assessment    Additional Documentation  Pain Scale: Numbers Pre/Post-Treatment (Group)  -SIMEON     Row Name 02/07/20 1058          Pain Scale: Numbers Pre/Post-Treatment    Pain Scale: Numbers, Pretreatment  3/10  -SIMEON     Pain Scale: Numbers, Post-Treatment  3/10  -SIMEON     Pain Location - Orientation  incisional  -SIMEON     Pain Location  chest  -SIMEON     Pain Intervention(s)  Repositioned;Ambulation/increased activity;Splinting  -SIMEON     Row Name 02/07/20 1056          Plan of Care Review    Plan of Care Reviewed With  patient  -SIMEON     Progress  improving  -SIMEON     Outcome Summary  PT eval is completed patient is able to ambulate 220 ft with CGA he felt better post  ambulation. anticipate patient will go home with family at D/C  -SIMEON     Row Name 02/07/20 1058          Physical Therapy Clinical Impression    Patient/Family Goals Statement (PT Clinical Impression)  to return to PLOF  -SIMEON     Criteria for Skilled Interventions Met (PT Clinical Impression)  yes;treatment indicated  -SIMEON     Rehab Potential (PT Clinical Summary)  good, to achieve stated therapy goals  -SIMEON     Row Name 02/07/20 1058          Vital Signs    Pre Systolic BP Rehab  108  -SIMEON     Pre Treatment Diastolic BP  64  -SIMEON     Post Systolic BP Rehab  113  -SIMEON     Post Treatment Diastolic BP  67  -SIMEON     Pretreatment Heart Rate (beats/min)  96 PVC's  -SIMEON     Posttreatment Heart Rate (beats/min)  100  -SIMEON     Pre SpO2 (%)  94  -SIMEON     O2 Delivery Pre Treatment  nasal cannula  -SIMEON     Post SpO2 (%)  93  -SIMEON     O2 Delivery Post Treatment  nasal cannula  -SIMEON     Pre Patient Position  Sitting  -SIMEON     Intra Patient Position  Standing  -SIMEON     Post Patient Position  Sitting  -SIMEON     Row Name 02/07/20 1058          Positioning and Restraints    Pre-Treatment Position  sitting in chair/recliner  -SIMEON     Post Treatment Position  chair  -SIMEON     In Chair  notified nsg;reclined;sitting;call light within reach;with family/caregiver  -SIEMON       User Key  (r) = Recorded By, (t) = Taken By, (c) = Cosigned By    Initials Name Provider Type    Alia Narvaez, PT Physical Therapist        Outcome Measures     Row Name 02/07/20 1102          How much help from another person do you currently need...    Turning from your back to your side while in flat bed without using bedrails?  3  -SIMEON     Moving from lying on back to sitting on the side of a flat bed without bedrails?  3  -SIMEON     Moving to and from a bed to a chair (including a wheelchair)?  3  -SIMEON     Standing up from a chair using your arms (e.g., wheelchair, bedside chair)?  3  -SIMEON     Climbing 3-5 steps with a railing?  3  -SIMEON     To walk in hospital room?  3  -SIMEON      AM-PAC 6 Clicks Score (PT)  18  -SIMEON     Row Name 02/07/20 1102          Functional Assessment    Outcome Measure Options  AM-PAC 6 Clicks Basic Mobility (PT)  -SIMEON       User Key  (r) = Recorded By, (t) = Taken By, (c) = Cosigned By    Initials Name Provider Type    Alia Narvaez PT Physical Therapist          PT Recommendation and Plan  Planned Therapy Interventions (PT Eval): balance training, bed mobility training, gait training, home exercise program, transfer training  Outcome Summary/Treatment Plan (PT)  Anticipated Discharge Disposition (PT): home with assist  Plan of Care Reviewed With: patient  Progress: improving  Outcome Summary: PT eval is completed patient is able to ambulate 220 ft with CGA he felt better post ambulation. anticipate patient will go home with family at D/C     Time Calculation:   PT Charges     Row Name 02/07/20 0915             Time Calculation    Start Time  0915  -SIMEON      PT Received On  02/07/20  -SIMEON      PT Goal Re-Cert Due Date  02/17/20  -SIMEON        User Key  (r) = Recorded By, (t) = Taken By, (c) = Cosigned By    Initials Name Provider Type    Alia Narvaez PT Physical Therapist        Therapy Charges for Today     Code Description Service Date Service Provider Modifiers Qty    [id number] HC PT EVAL LOW COMPLEXITY 3 2/7/2020 Alia Crane PT GP 1          PT G-Codes  Outcome Measure Options: AM-PAC 6 Clicks Basic Mobility (PT)  AM-PAC 6 Clicks Score (PT): 18    Alia Crane PT  2/7/2020

## 2020-02-07 NOTE — PLAN OF CARE
Problem: Patient Care Overview  Goal: Plan of Care Review  Outcome: Ongoing (interventions implemented as appropriate)  Flowsheets (Taken 2/7/2020 3682)  Progress: improving  Plan of Care Reviewed With: patient; spouse  Good day for pt. Ambulated twice this shift; 200 ft in a.m. & 400 ft this afternoon. [place] d/c'd just before change of shift. Voided around noon, 200 ml dark, concentrated urine. That's the only output he has had during the shift. Bladder scanned 5 times 0 ml retained. SBP in high 70's to low 80's. Called CT surgery. Orders for 250 ml bolus of fluid and restart low dose Levophed. Pt and his wife updated on plan of care. He is CA/O x 4. COLBY. Pain management better today.----------------------LAUREN Arias RN-------------------

## 2020-02-07 NOTE — PROGRESS NOTES
Continued Stay Note   Mary     Patient Name: Ross Quintero  MRN: [id number]  Today's Date: 2/7/2020    Admit Date: 2/5/2020    Discharge Plan     Row Name 02/07/20 1212       Plan    Plan  home with family    Plan Comments  SW met with pt at bedside.  Pt feels better post ambulation.  He may be discharged on Monday depending on progress.  Cardiac Rehab is following and a referral was faxed to [facility].  Pt denies having any discharge needs at this time.  ANTONIO Balderas @ [phone]    Final Discharge Disposition Code  01 - home or self-care        Discharge Codes    No documentation.       Expected Discharge Date and Time     Expected Discharge Date Expected Discharge Time    Feb 10, 2020             ANTONIO Castillo

## 2020-02-07 NOTE — PLAN OF CARE
Problem: Patient Care Overview  Goal: Plan of Care Review  Outcome: Ongoing (interventions implemented as appropriate)  Flowsheets (Taken 2/7/2020 3537)  Progress: improving  Plan of Care Reviewed With: patient  Outcome Summary: VSS, pt AOx4 throughout shift with c/o moderate-severe pain. Pt still requiring morphine, lortab, and roxicodone around the clock. Pt SBP has been 90s-110s througout shift-- levo currently at 0.02 mcgs/kg/min. [place] still in place per Edvin. SR with occasional PVCs. CT adequate output of 100. Adequate UOP. No other concerns at this time. Will continue to monitor.

## 2020-02-07 NOTE — NURSING NOTE
Pt. Referred for Phase II Cardiac Rehab. Staff discussed benefits of exercise, program protocol, and educational material provided. Teach back verified.  Permission granted from patient for staff to fax referral information to outlying program at this time.  Staff to fax referral info to [facility].

## 2020-02-07 NOTE — PROGRESS NOTES
"  [facility]   Inpatient Progress Note       LOS: 2 days   Patient Care Team:  Saul Cowan MD as PCP - General (Internal Medicine)  Darinel Martinez MD as Consulting Physician (Cardiology)  Darinel Pardo MD as Surgeon (Cardiothoracic Surgery)    Chief Complaint:  Follow-up for CHF and ICM    Subjective     Interval History:   Patient up in chair. Has had chest tubes removed. Has some mild left shoulder discomfort. Still feels a little swollen. Is on supplemental oxygen at 3L.  Overall he feels significantly better since his NG tube have been removed.  Has ambulated without difficulty.      Review of Systems:   Pertinent positives noted in history, exam, and assessment. Otherwise reviewed and negative.      Objective     Vitals:  Blood pressure 113/65, pulse 99, temperature 99.1 °F (37.3 °C), temperature source Core, resp. rate 20, height 172.7 cm (67.99\"), weight 107 kg (236 lb 15.9 oz), SpO2 (!) 89 %.     Intake/Output Summary (Last 24 hours) at 2/7/2020 0956  Last data filed at 2/7/2020 0600  Gross per 24 hour   Intake 1886 ml   Output 1675 ml   Net 211 ml     Physical Exam   Constitutional: He is oriented to person, place, and time. He appears well-developed and well-nourished.   Neck: No JVD present. No tracheal deviation present.   Cardiovascular: Normal rate, regular rhythm, normal heart sounds and intact distal pulses. Exam reveals no friction rub.   No murmur heard.  Pulmonary/Chest: Effort normal. No respiratory distress. He has no rales.   Decreased bases  On NC   Abdominal: Soft. Bowel sounds are normal. There is no tenderness.   Musculoskeletal: He exhibits no edema or deformity. Tenderness: geo.   Neurological: He is alert and oriented to person, place, and time.   Skin: Skin is warm and dry.     I have examined the patient and agree with the above       Results Review:     I reviewed the patient's new clinical results.    Results from last 7 days   Lab Units " 02/07/20  0342   WBC 10*3/mm3 9.38   HEMOGLOBIN g/dL 9.7*   HEMATOCRIT % 30.1*   PLATELETS 10*3/mm3 99*     Results from last 7 days   Lab Units 02/07/20  0342  02/04/20  1535   SODIUM mmol/L 136   < > 139   POTASSIUM mmol/L 4.5   < > 4.2   CHLORIDE mmol/L 103   < > 101   CO2 mmol/L 24.0   < > 25.0   BUN mg/dL 10   < > 12   CREATININE mg/dL 0.89   < > 0.86   CALCIUM mg/dL 8.6   < > 9.3   BILIRUBIN mg/dL  --   --  0.7   ALK PHOS U/L  --   --  83   ALT (SGPT) U/L  --   --  19   AST (SGOT) U/L  --   --  19   GLUCOSE mg/dL 126*   < > 84    < > = values in this interval not displayed.     Results from last 7 days   Lab Units 02/07/20  0342   SODIUM mmol/L 136   POTASSIUM mmol/L 4.5   CHLORIDE mmol/L 103   CO2 mmol/L 24.0   BUN mg/dL 10   CREATININE mg/dL 0.89   GLUCOSE mg/dL 126*   CALCIUM mg/dL 8.6     Results from last 7 days   Lab Units 02/06/20  0319 02/05/20  1134 02/04/20  1535   INR  1.38* 1.47* 1.09     No results found for: TROPONINI                  Tele:  SR    Assessment/Plan       Coronary artery disease status post coronary artery bypass grafting x4.    Chronic systolic CHF (congestive heart failure) (CMS/[facility])    HTN (hypertension)      1. Ischemic CM/MVCAD  - LVEF 25% pre-op   - CABG x4 2/5/2020     2. SHF  - will need to resume Entresto  - currently getting low dose lopressor       3. HLD  -   - Lipitor to 80mg     Plan:  · Switch beta-blocker to carvedilol due to bad LVEF  · Encourage IS use and ambulation  · Will need limited echo prior to discharge (likely on Monday).  As may need LifeVest.  · Holding ACE/arm/Arni due to hypotension.  Will need to resume sometime over the weekend once blood pressure allows    GRACE Pink     I have seen and examined the patient, I have reviewed the note, discussed the case with the advance practice clinician, made necessary changes and I agree with the final note.    Darinel Martinez MD  02/07/20  10:45 AM        Dictated utilizing Dragon  dictation

## 2020-02-08 LAB
ABO + RH BLD: NORMAL
ABO + RH BLD: NORMAL
ANION GAP SERPL CALCULATED.3IONS-SCNC: 10 MMOL/L (ref 5–15)
BH BB BLOOD EXPIRATION DATE: NORMAL
BH BB BLOOD EXPIRATION DATE: NORMAL
BH BB BLOOD TYPE BARCODE: 7300
BH BB BLOOD TYPE BARCODE: 7300
BH BB DISPENSE STATUS: NORMAL
BH BB DISPENSE STATUS: NORMAL
BH BB PRODUCT CODE: NORMAL
BH BB PRODUCT CODE: NORMAL
BH BB UNIT NUMBER: NORMAL
BH BB UNIT NUMBER: NORMAL
BUN BLD-MCNC: 15 MG/DL (ref 6–20)
BUN/CREAT SERPL: 17.2 (ref 7–25)
CALCIUM SPEC-SCNC: 8.6 MG/DL (ref 8.6–10.5)
CHLORIDE SERPL-SCNC: 98 MMOL/L (ref 98–107)
CO2 SERPL-SCNC: 25 MMOL/L (ref 22–29)
CREAT BLD-MCNC: 0.87 MG/DL (ref 0.76–1.27)
DEPRECATED RDW RBC AUTO: 50.9 FL (ref 37–54)
ERYTHROCYTE [DISTWIDTH] IN BLOOD BY AUTOMATED COUNT: 14.6 % (ref 12.3–15.4)
GFR SERPL CREATININE-BSD FRML MDRD: 112 ML/MIN/1.73
GFR SERPL CREATININE-BSD FRML MDRD: 92 ML/MIN/1.73
GLUCOSE BLD-MCNC: 137 MG/DL (ref 65–99)
GLUCOSE BLDC GLUCOMTR-MCNC: 107 MG/DL (ref 70–130)
GLUCOSE BLDC GLUCOMTR-MCNC: 117 MG/DL (ref 70–130)
GLUCOSE BLDC GLUCOMTR-MCNC: 127 MG/DL (ref 70–130)
GLUCOSE BLDC GLUCOMTR-MCNC: 132 MG/DL (ref 70–130)
HCT VFR BLD AUTO: 28.9 % (ref 37.5–51)
HGB BLD-MCNC: 9.2 G/DL (ref 13–17.7)
MCH RBC QN AUTO: 30.7 PG (ref 26.6–33)
MCHC RBC AUTO-ENTMCNC: 31.8 G/DL (ref 31.5–35.7)
MCV RBC AUTO: 96.3 FL (ref 79–97)
PLATELET # BLD AUTO: 89 10*3/MM3 (ref 140–450)
PMV BLD AUTO: 11.2 FL (ref 6–12)
POTASSIUM BLD-SCNC: 4 MMOL/L (ref 3.5–5.2)
RBC # BLD AUTO: 3 10*6/MM3 (ref 4.14–5.8)
SODIUM BLD-SCNC: 133 MMOL/L (ref 136–145)
UNIT  ABO: NORMAL
UNIT  ABO: NORMAL
UNIT  RH: NORMAL
UNIT  RH: NORMAL
WBC NRBC COR # BLD: 6.92 10*3/MM3 (ref 3.4–10.8)

## 2020-02-08 PROCEDURE — 82962 GLUCOSE BLOOD TEST: CPT

## 2020-02-08 PROCEDURE — 99232 SBSQ HOSP IP/OBS MODERATE 35: CPT | Performed by: INTERNAL MEDICINE

## 2020-02-08 PROCEDURE — 25010000002 HEPARIN (PORCINE) PER 1000 UNITS: Performed by: INTERNAL MEDICINE

## 2020-02-08 PROCEDURE — 85027 COMPLETE CBC AUTOMATED: CPT | Performed by: PHYSICIAN ASSISTANT

## 2020-02-08 PROCEDURE — 80048 BASIC METABOLIC PNL TOTAL CA: CPT | Performed by: PHYSICIAN ASSISTANT

## 2020-02-08 PROCEDURE — 99024 POSTOP FOLLOW-UP VISIT: CPT | Performed by: THORACIC SURGERY (CARDIOTHORACIC VASCULAR SURGERY)

## 2020-02-08 PROCEDURE — 97116 GAIT TRAINING THERAPY: CPT

## 2020-02-08 RX ORDER — HEPARIN SODIUM 5000 [USP'U]/ML
5000 INJECTION, SOLUTION INTRAVENOUS; SUBCUTANEOUS EVERY 8 HOURS SCHEDULED
Status: DISCONTINUED | OUTPATIENT
Start: 2020-02-08 | End: 2020-02-11 | Stop reason: HOSPADM

## 2020-02-08 RX ADMIN — OXYCODONE HYDROCHLORIDE 10 MG: 5 TABLET ORAL at 01:49

## 2020-02-08 RX ADMIN — SENNOSIDES AND DOCUSATE SODIUM 2 TABLET: 8.6; 5 TABLET ORAL at 21:19

## 2020-02-08 RX ADMIN — ASPIRIN 325 MG: 325 TABLET, DELAYED RELEASE ORAL at 08:18

## 2020-02-08 RX ADMIN — OXYCODONE HYDROCHLORIDE 10 MG: 5 TABLET ORAL at 08:19

## 2020-02-08 RX ADMIN — HYDROCODONE BITARTRATE AND ACETAMINOPHEN 1 TABLET: 7.5; 325 TABLET ORAL at 13:11

## 2020-02-08 RX ADMIN — ATORVASTATIN CALCIUM 80 MG: 40 TABLET, FILM COATED ORAL at 21:19

## 2020-02-08 RX ADMIN — SENNOSIDES AND DOCUSATE SODIUM 2 TABLET: 8.6; 5 TABLET ORAL at 08:18

## 2020-02-08 RX ADMIN — CARVEDILOL 6.25 MG: 6.25 TABLET, FILM COATED ORAL at 08:18

## 2020-02-08 RX ADMIN — HEPARIN SODIUM 5000 UNITS: 5000 INJECTION, SOLUTION INTRAVENOUS; SUBCUTANEOUS at 13:12

## 2020-02-08 RX ADMIN — HEPARIN SODIUM 5000 UNITS: 5000 INJECTION, SOLUTION INTRAVENOUS; SUBCUTANEOUS at 21:19

## 2020-02-08 RX ADMIN — HYDROCODONE BITARTRATE AND ACETAMINOPHEN 1 TABLET: 7.5; 325 TABLET ORAL at 21:19

## 2020-02-08 NOTE — PROGRESS NOTES
CTS Progress Note       LOS: 3 days   Patient Care Team:  Saul Cowan MD as PCP - General (Internal Medicine)  Darinel Martinez MD as Consulting Physician (Cardiology)  Darinel Pardo MD as Surgeon (Cardiothoracic Surgery)    Chief Complaint: Atherosclerosis of native coronary artery of native heart    Vital Signs:  Temp:  [98.2 °F (36.8 °C)-98.5 °F (36.9 °C)] 98.2 °F (36.8 °C)  Heart Rate:  [] 96  Resp:  [18-22] 18  BP: ()/(39-96) 101/53    Physical Exam: Alert conversant breathing unlabored neurologically intact       Results:   Results from last 7 days   Lab Units 02/08/20  0343   WBC 10*3/mm3 6.92   HEMOGLOBIN g/dL 9.2*   HEMATOCRIT % 28.9*   PLATELETS 10*3/mm3 89*     Results from last 7 days   Lab Units 02/08/20  0343   SODIUM mmol/L 133*   POTASSIUM mmol/L 4.0   CHLORIDE mmol/L 98   CO2 mmol/L 25.0   BUN mg/dL 15   CREATININE mg/dL 0.87   GLUCOSE mg/dL 137*   CALCIUM mg/dL 8.6           Imaging Results (Last 24 Hours)     Procedure Component Value Units Date/Time    XR Chest 1 View [[id number]] Collected:  02/07/20 0901     Updated:  02/07/20 0912    Narrative:       EXAMINATION: XR CHEST 1 VW- 02/07/2020     INDICATION: Post-Op Heart Surgery; I25.10-Atherosclerotic heart disease  of native coronary artery without angina pectoris     COMPARISON: 02/06/2020     FINDINGS: PA catheter tip lies in the main pulmonary segment. The heart  is enlarged. Vasculature appears normal. There is very mild bibasilar  discoid atelectasis. No pneumothorax is seen.       Impression:       1. Cardiomegaly without evidence of congestive failure.  2. Mild bibasilar discoid atelectasis.         D:  02/07/2020  E:  02/07/2020             Assessment      Coronary artery disease status post coronary artery bypass grafting x4.    Chronic systolic CHF (congestive heart failure) (CMS/[facility])    HTN (hypertension)    All pressor agents are off.  Chest tubes were removed yesterday.    Plan   Transfer to  telemetry    Please note that portions of this note were completed with a voice recognition program. Efforts were made to edit the dictations, but occasionally words are mistranscribed.    Dhruv Uribe MD  02/08/20  7:04 AM

## 2020-02-08 NOTE — THERAPY TREATMENT NOTE
Patient Name: Ross Quintero  : 1965    MRN: [id number]                              Today's Date: 2020       Admit Date: 2020    Visit Dx:     ICD-10-CM ICD-9-CM   1. Atherosclerosis of native coronary artery of native heart, angina presence unspecified I25.10 414.01     Patient Active Problem List   Diagnosis   • Abnormal echocardiogram   • Angina at rest (CMS/[facility])   • Coronary artery disease status post coronary artery bypass grafting x4.   • Chronic systolic CHF (congestive heart failure) (CMS/[facility])   • HTN (hypertension)     Past Medical History:   Diagnosis Date   • Cardiomyopathy (CMS/HCC)    • CHF (congestive heart failure) (CMS/[facility])    • Hyperlipidemia    • Hypertension    • Tobacco abuse      Past Surgical History:   Procedure Laterality Date   • CARDIAC CATHETERIZATION N/A 2020    Procedure: Left Heart Cath;  Surgeon: Darinel Martinez MD;  Location:  [facility];  Service: Cardiovascular   • CARDIAC CATHETERIZATION  2020   • CORONARY ARTERY BYPASS GRAFT N/A 2020    Procedure: MEDIAN STERNOTOMY, CORONARY ARTERY BYPASS X 4 WITH LEFT INTERNAL MAMMARY ARTERY GRAFT, ENDOSCOPIC VEIN HARVEST OF RIGHT GREATER SAPHENOUS VEIN;  Surgeon: Darinel Pardo MD;  Location:  [facility] OR;  Service: Cardiothoracic;  Laterality: N/A;  VEIN OUT: 0832  VEIN READY: 0843     • LASIK     • VASECTOMY      STATES RECEIVED GENERAL ANESTHESIA FOR THIS   • WISDOM TOOTH EXTRACTION       General Information     Row Name 20 1118          PT Evaluation Time/Intention    Document Type  therapy note (daily note)  -KR     Mode of Treatment  physical therapy  -KR     Row Name 20 1118          General Information    Existing Precautions/Restrictions  cardiac;fall;oxygen therapy device and L/min;sternal  -KR     Row Name 20 1118          Cognitive Assessment/Intervention- PT/OT    Orientation Status (Cognition)  oriented x 4  -KR     Row Name 20 1118          Safety  Issues, Functional Mobility    Safety Issues Affecting Function (Mobility)  insight into deficits/self awareness;safety precaution awareness;safety precautions follow-through/compliance  -KR     Impairments Affecting Function (Mobility)  balance;endurance/activity tolerance;strength;shortness of breath  -KR       User Key  (r) = Recorded By, (t) = Taken By, (c) = Cosigned By    Initials Name Provider Type    Taylor Agarwal, PT Physical Therapist        Mobility     Row Name 02/08/20 1118          Bed Mobility Assessment/Treatment    Comment (Bed Mobility)  UIC  -KR     Row Name 02/08/20 1118          Transfer Assessment/Treatment    Comment (Transfers)  VC's for hand placement to maintain sternal precautions.   -KR     Row Name 02/08/20 1118          Sit-Stand Transfer    Sit-Stand [place] (Transfers)  contact guard;verbal cues  -KR     Row Name 02/08/20 1118          Gait/Stairs Assessment/Training    Gait/Stairs Assessment/Training  gait/ambulation independence  -KR     [place] Level (Gait)  contact guard;verbal cues  -KR     Distance in Feet (Gait)  200  -KR     Pattern (Gait)  step-through  -KR     Deviations/Abnormal Patterns (Gait)  barbra decreased;stride length decreased  -KR     Comment (Gait/Stairs)  Pt demonstrated step through gait pattern with slow barbra and decreased step length. Pt demonstrated good stability with no LOB. Pt progressed to periods of SBA. Pt declined additional ambulation despite increased encouragement.   -KR       User Key  (r) = Recorded By, (t) = Taken By, (c) = Cosigned By    Initials Name Provider Type    Taylor Agarwal, PT Physical Therapist        Obj/Interventions     Row Name 02/08/20 1123          Static Sitting Balance    Level of [place] (Unsupported Sitting, Static Balance)  independent  -KR     Sitting Position (Unsupported Sitting, Static Balance)  sitting in chair  -KR     Row Name 02/08/20 1123          Static Standing Balance    Level of  [place] (Supported Standing, Static Balance)  standby assist  -KR     Row Name 02/08/20 1123          Dynamic Standing Balance    Level of [place], Reaches Outside Midline (Standing, Dynamic Balance)  contact guard assist  -KR       User Key  (r) = Recorded By, (t) = Taken By, (c) = Cosigned By    Initials Name Provider Type    Taylor Agarwal PT Physical Therapist        Goals/Plan    No documentation.       Clinical Impression     Row Name 02/08/20 1123          Pain Assessment    Additional Documentation  Pain Scale: Numbers Pre/Post-Treatment (Group)  -KR     Row Name 02/08/20 1123          Pain Scale: Numbers Pre/Post-Treatment    Pain Scale: Numbers, Pretreatment  3/10  -KR     Pain Scale: Numbers, Post-Treatment  3/10  -KR     Pain Location - Orientation  incisional  -KR     Pain Location  chest  -KR     Pain Intervention(s)  Repositioned;Ambulation/increased activity  -KR     Row Name 02/08/20 1123          Vital Signs    Pre Systolic BP Rehab  134  -KR     Pre Treatment Diastolic BP  81  -KR     Post Systolic BP Rehab  144  -KR     Post Treatment Diastolic BP  78  -KR     Pretreatment Heart Rate (beats/min)  103  -KR     Posttreatment Heart Rate (beats/min)  107  -KR     Pre SpO2 (%)  95  -KR     O2 Delivery Pre Treatment  supplemental O2  -KR     Post SpO2 (%)  94  -KR     O2 Delivery Post Treatment  supplemental O2  -KR     Pre Patient Position  Sitting  -KR     Intra Patient Position  Standing  -KR     Post Patient Position  Sitting  -KR     Row Name 02/08/20 1123          Positioning and Restraints    Pre-Treatment Position  sitting in chair/recliner  -KR     Post Treatment Position  chair  -KR     In Chair  notified nsg;sitting;call light within reach;encouraged to call for assist;with family/caregiver;RUE elevated;LUE elevated  -KR       User Key  (r) = Recorded By, (t) = Taken By, (c) = Cosigned By    Initials Name Provider Type    Taylor Agarwal PT Physical Therapist         Outcome Measures     Row Name 02/08/20 1124          How much help from another person do you currently need...    Turning from your back to your side while in flat bed without using bedrails?  3  -KR     Moving from lying on back to sitting on the side of a flat bed without bedrails?  3  -KR     Moving to and from a bed to a chair (including a wheelchair)?  3  -KR     Standing up from a chair using your arms (e.g., wheelchair, bedside chair)?  3  -KR     Climbing 3-5 steps with a railing?  3  -KR     To walk in hospital room?  3  -KR     AM-PAC 6 Clicks Score (PT)  18  -KR     Row Name 02/08/20 1124          Functional Assessment    Outcome Measure Options  AM-PAC 6 Clicks Basic Mobility (PT)  -KR       User Key  (r) = Recorded By, (t) = Taken By, (c) = Cosigned By    Initials Name Provider Type    Taylor Agarwal PT Physical Therapist          PT Recommendation and Plan     Plan of Care Reviewed With: patient  Progress: improving  Outcome Summary: Pt ambulated 200ft with CGA, progressing to periods of SBA. VC's for increased stride length. Pt demonstrated good stability with no LOB. Pt declined additional ambulation despite increased encouragement. Continue to progress as appropriate.     Time Calculation:   PT Charges     Row Name 02/08/20 1028             Time Calculation    Start Time  1028  -KR      PT Received On  02/08/20  -KR      PT Goal Re-Cert Due Date  02/17/20  -KR         Time Calculation- PT    Total Timed Code Minutes- PT  17 minute(s)  -KR         Timed Charges    02558 - Gait Training Minutes   17  -KR        User Key  (r) = Recorded By, (t) = Taken By, (c) = Cosigned By    Initials Name Provider Type    Taylor Agarwal PT Physical Therapist        Therapy Charges for Today     Code Description Service Date Service Provider Modifiers Qty    35989816005 HC GAIT TRAINING EA 15 MIN 2/8/2020 Taylor Emanuel, PT GP 1    17742770205 HC PT THER SUPP EA 15 MIN 2/8/2020 Taylor Emanuel PT GP 1           PT G-Codes  Outcome Measure Options: AM-PAC 6 Clicks Basic Mobility (PT)  AM-PAC 6 Clicks Score (PT): 18    Yanely Emanuel, PT  2/8/2020

## 2020-02-08 NOTE — PLAN OF CARE
Problem: Patient Care Overview  Goal: Plan of Care Review  Outcome: Ongoing (interventions implemented as appropriate)  Flowsheets (Taken 2/8/2020 8249)  Progress: improving  Plan of Care Reviewed With: patient; spouse; daughter  More independent today. Coughing up secretions. Using IS, frequently. Off vasoactive meds thus far this shift. Good UOP. VSS. Continues w/ runs of trigemeny and bigemeny occasionally. A.M. Coreg given today. Pain better controlled today. Weaned from 3l/nc to 2l/nc. -------------------------LAUREN Arias RN---------------------------------

## 2020-02-08 NOTE — PROGRESS NOTES
INTENSIVIST   PROGRESS NOTE     Hospital:  LOS: 3 days     Mr. Ross Quintero, 54 y.o. male is followed for a Chief Complaint of: Postoperative management      Subjective   S     Interval History:  No acute events. Breathing better this AM. Pain better controlled.        The patient's relevant past medical, surgical and social history were reviewed and updated in Epic as appropriate.      ROS:   Constitutional: Negative for fever.   Respiratory: Positive for dyspnea.   Cardiovascular: Negative for chest pain.   Gastrointestinal: Negative for  nausea, vomiting and diarrhea.     Objective   O     Vitals:  Temp  Min: 98.2 °F (36.8 °C)  Max: 98.5 °F (36.9 °C)  BP  Min: 77/61  Max: 135/64  Pulse  Min: 83  Max: 102  Resp  Min: 18  Max: 20  SpO2  Min: 89 %  Max: 99 % Flow (L/min)  Min: 3  Max: 3    Intake/Ouptut 24 hrs (7:00AM - 6:59 AM)  Intake & Output (last 3 days)       02/05 0701 - 02/06 0700 02/06 0701 - 02/07 0700 02/07 0701 - 02/08 0700 02/08 0701 - 02/09 0700    P.O.  900 1575 268    I.V. (mL/kg) 4051 1296 (12.1) 56 (0.5)     IV Piggyback 1250 50 250     Total Intake(mL/kg) 5301 2246 (21) 1881 (17.6) 268 (2.5)    Urine (mL/kg/hr) 5515 1625 (0.6) 750 (0.3)     Emesis/NG output 40       Chest Tube 630 300      Total Output 6185 1925 750     Net -884 +321 +1131 +268            Urine Unmeasured Occurrence   1 x           Medications (drips):    niCARdipine    norepinephrine Last Rate: Stopped (02/08/20 0545)         Physical Examination  Telemetry:  Normal sinus rhythm.    Constitutional:  No acute distress.  Sitting up in a chair.    Cardiovascular: Normal rate, regular and rhythm. Normal heart sounds.  No murmurs, gallop or rub.   Respiratory: No respiratory distress. Normal respiratory effort.  Normal breath sounds  Clear to ascultation   Abdominal:  Soft. No masses. Non-tender. No distension. No HSM.   Extremities: No digital cyanosis. No clubbing.  No peripheral edema.   Neurological:   Alert and Oriented to  person, place, and time.              Results from last 7 days   Lab Units 02/08/20  0343 02/07/20  0342 02/06/20  0319   WBC 10*3/mm3 6.92 9.38 8.38   HEMOGLOBIN g/dL 9.2* 9.7* 9.7*   MCV fL 96.3 97.1* 94.2   PLATELETS 10*3/mm3 89* 99* 127*     Results from last 7 days   Lab Units 02/08/20  0343 02/07/20  0342 02/06/20  2259 02/06/20  0319 02/06/20  0000   SODIUM mmol/L 133* 136 135* 141 143   POTASSIUM mmol/L 4.0 4.5 4.3 4.1 4.1   CO2 mmol/L 25.0 24.0 24.0 24.0 25.0   CREATININE mg/dL 0.87 0.89 0.89 1.03 1.03   GLUCOSE mg/dL 137* 126* 138* 132* 127*   MAGNESIUM mg/dL  --   --  2.1 2.2 2.2   PHOSPHORUS mg/dL  --   --  2.7 4.0 4.0     Estimated Creatinine Clearance: 118.3 mL/min (by C-G formula based on SCr of 0.87 mg/dL).  Results from last 7 days   Lab Units 02/04/20  1535   ALK PHOS U/L 83   BILIRUBIN mg/dL 0.7   ALT (SGPT) U/L 19   AST (SGOT) U/L 19       Results from last 7 days   Lab Units 02/05/20  1617 02/05/20  1140 02/05/20  1027 02/05/20  1000 02/05/20  0933   PH, ARTERIAL pH units 7.320* 7.358 7.40 7.43 7.43   PCO2, ARTERIAL mm Hg 53.3 45.6  --   --   --    PO2 ART mm Hg 80.9* 135.0*  --   --   --    FIO2 % 40 100  --   --   --        Images:  Imaging Results (Last 24 Hours)     ** No results found for the last 24 hours. **            Results: Reviewed.  I reviewed the patient's new laboratory and imaging results.  I independently reviewed the patient's new images.    Medications: Reviewed.    Assessment/Plan   A / P     Mr. Quintero is a 55yo M who underwent CABG x 4 by Dr. Pardo on 2/5/20.     Nutrition:   Diet Regular; Cardiac  Advance Directives:   Code Status and Medical Interventions:   Ordered at: 02/05/20 1120     Code Status:    CPR     Medical Interventions (Level of Support Prior to Arrest):    Full       Active Hospital Problems    Diagnosis   • **Coronary artery disease status post coronary artery bypass grafting x4.   • Chronic systolic CHF (congestive heart failure) (CMS/[facility])   • HTN  (hypertension)       Assessment / Plan:    1. Off all pressors.   2. Chest tubes removed on 2/7/20. Start SQH.   3. Blood glucose well controlled.   4. Ambulate  5. AM labs  6. Okay to transfer to telemetry when a bed is available.       Plan of care and goals reviewed during interdisciplinary rounds.  I discussed the patient's findings and my recommendations with patient and nursing staff      Agnieszka Hayward, DO    Intensive Care Medicine and Pulmonary Medicine

## 2020-02-08 NOTE — PLAN OF CARE
Problem: Patient Care Overview  Goal: Plan of Care Review  Outcome: Ongoing (interventions implemented as appropriate)  Flowsheets (Taken 2/8/2020 7213)  Progress: improving  Plan of Care Reviewed With: patient  Outcome Summary: Took over pt care at 0100, Pt AOx4 with c/o moderate pain-- PRN pain medication given-- tolerating well. Able to titrate levophed down to 0.02 with SBP 90s-130s during my shift. Pt voided 550 with no problems. Pt resting comfortably with daughter at bedside-- will continue to closely monitor.

## 2020-02-08 NOTE — PROGRESS NOTES
[facility] at [facility]  IP Progress Note      Chief Complaint/Reason for service: #1 ischemic cardiomyopathy    Subjective   Subjective: Patient states he feels better today.  Still has a little pain in his shoulder when he takes a deep breath.  He states other than a little bit of shortness of breath at night he was asymptomatic with his CAD.  Appetite is poor right now but he denies nausea and vomiting    Past medical, surgical, social and family history reviewed in the patient's electronic medical record.    Objective     Vital Sign Min/Max for last 24 hours  Temp  Min: 98.2 °F (36.8 °C)  Max: 98.5 °F (36.9 °C)   BP  Min: 77/61  Max: 135/64   Pulse  Min: 83  Max: 102   Resp  Min: 18  Max: 22   SpO2  Min: 89 %  Max: 99 %   Flow (L/min)  Min: 3  Max: 3      Intake/Output Summary (Last 24 hours) at 2/8/2020 0704  Last data filed at 2/8/2020 0600  Gross per 24 hour   Intake 1881 ml   Output 750 ml   Net 1131 ml             Current Facility-Administered Medications:   •  acetaminophen (TYLENOL) tablet 650 mg, 650 mg, Oral, Q4H PRN, Darinel Pardo MD  •  aspirin EC tablet 325 mg, 325 mg, Oral, Daily, Roman Joshi PA, 325 mg at 02/07/20 0813  •  atorvastatin (LIPITOR) tablet 80 mg, 80 mg, Oral, Nightly, Cata Zuniga PA-C, 80 mg at 02/07/20 2040  •  bisacodyl (DULCOLAX) EC tablet 10 mg, 10 mg, Oral, Daily PRN, Darinel Pardo MD  •  bisacodyl (DULCOLAX) suppository 10 mg, 10 mg, Rectal, Daily PRN, Darinel Pardo MD  •  carvedilol (COREG) tablet 6.25 mg, 6.25 mg, Oral, Q12H, Darinel Martinez MD, Stopped at 02/07/20 1200  •  HYDROcodone-acetaminophen (NORCO) 7.5-325 MG per tablet 1 tablet, 1 tablet, Oral, Q4H PRN, Darinel Pardo MD, 1 tablet at 02/07/20 1950  •  magnesium hydroxide (MILK OF MAGNESIA) suspension 2400 mg/10mL 10 mL, 10 mL, Oral, Daily PRN, Darinel Pardo MD  •  morphine injection 2 mg, 2 mg, Intravenous, Q2H PRN, Darinel Pardo MD, 2 mg at  02/07/20 1950  •  naloxone (NARCAN) injection 0.2 mg, 0.2 mg, Intravenous, PRN, Darinel Pardo MD  •  niCARdipine (CARDENE) 40 mg in 200 mL NS (0.2 mg/mL) infusion, 5-15 mg/hr, Intravenous, Continuous PRN, Roman Joshi PA  •  norepinephrine (LEVOPHED) 8 mg/250 mL (32 mcg/mL) in sodium chloride 0.9% infusion (premix), 0.02-0.3 mcg/kg/min, Intravenous, Titrated, Roman Joshi PA, Stopped at 02/08/20 0545  •  ondansetron (ZOFRAN) injection 4 mg, 4 mg, Intravenous, Q6H PRN, Roman Joshi PA, 4 mg at 02/05/20 1722  •  oxyCODONE (ROXICODONE) immediate release tablet 10 mg, 10 mg, Oral, Q4H PRN, Darinel Pardo MD, 10 mg at 02/08/20 0149  •  Pharmacy Consult - [facility], , Does not apply, Daily, Shekhar Bhakta, [facility], 1 each at 02/07/20 0814  •  phenol (CHLORASEPTIC) 1.4 % liquid 2 spray, 2 spray, Mouth/Throat, Q2H PRN, Darinel Pardo MD  •  potassium chloride (MICRO-K) CR capsule 40 mEq, 40 mEq, Oral, PRN **OR** potassium chloride (KLOR-CON) packet 40 mEq, 40 mEq, Oral, PRN, Roman Joshi PA  •  sennosides-docusate (PERICOLACE) 8.6-50 MG per tablet 2 tablet, 2 tablet, Oral, BID, Darinel Pardo MD, 2 tablet at 02/07/20 2040    Physical Exam: General well-developed well-nourished white male in his bed at 60 degree angle not dyspneic not tachypneic but is wearing nasal O2        HEENT: No JVP no icterus       Respiratory: Equal bilateral symmetrical expansion with isolated crackles and reduced breath sounds on the right       Cardiovascular: Regular rate and rhythm with occasional ectopic no murmur no edema       GI: Positive bowel sounds       Lower Extremities: No lesions       Neuro: Facial expressions are symmetrical moves all 4 extremities       Skin: Warm and dry with no edema to palpation is a large tattoo on the right lateral leg       Psych: Pleasant affect    Results Review: Input exceeds outtake by 1.1 L.  Hemoglobin is 9.7.  Renal function is normal.  Blood pressure is more stable but he is on  low-dose nor epi    Radiology Results:  Imaging Results (Last 72 Hours)     Procedure Component Value Units Date/Time    XR Chest 1 View [[id number]] Collected:  02/07/20 0901     Updated:  02/07/20 0912    Narrative:       EXAMINATION: XR CHEST 1 VW- 02/07/2020     INDICATION: Post-Op Heart Surgery; I25.10-Atherosclerotic heart disease  of native coronary artery without angina pectoris     COMPARISON: 02/06/2020     FINDINGS: PA catheter tip lies in the main pulmonary segment. The heart  is enlarged. Vasculature appears normal. There is very mild bibasilar  discoid atelectasis. No pneumothorax is seen.       Impression:       1. Cardiomegaly without evidence of congestive failure.  2. Mild bibasilar discoid atelectasis.         D:  02/07/2020  E:  02/07/2020       XR Chest 1 View [[id number]] Collected:  02/06/20 0901     Updated:  02/06/20 2234    Narrative:       EXAMINATION: XR CHEST 1 VW- 02/06/2020     INDICATION: Post-Op Heart Surgery; I25.10-Atherosclerotic heart disease  of native coronary artery without angina pectoris     COMPARISON: 02/05/2020     FINDINGS: ET tube and NG tube appear to be in good position. PA catheter  tip lies in the main pulmonary artery segment. The heart is enlarged.  The vasculature appears upper limits of normal. There appears to be  trace bibasilar discoid atelectasis, but no other new pulmonary disease  following extubation.       Impression:       Post extubation chest radiograph with very mild bibasilar  discoid atelectasis. No other new chest disease is seen.     D:  02/06/2020  E:  02/06/2020         This report was finalized on 2/6/2020 10:30 PM by Dr. Homar High MD.       XR Chest 1 View [[id number]] Collected:  02/05/20 1149     Updated:  02/05/20 2247    Narrative:       EXAMINATION: XR CHEST 1 VW-02/05/2020:     INDICATION: Post-Op Check Line & Tube Placement;  I25.10-Atherosclerotic heart disease of native coronary artery without  angina pectoris.     COMPARISON:  02/04/2020.     FINDINGS: Sternotomy wires are now seen. ET tube, NG tube and PA  catheter appear to be in good position. The heart is borderline  enlarged. The vasculature appears upper limits of normal. The lungs  appear practically clear with only trace discoid atelectasis in the left  base. No pneumothorax or effusion is seen.       Impression:       Poststernotomy chest with minimal left basilar discoid  atelectasis. No evidence of pneumothorax or other significant disease is  seen.     D:  02/05/2020  E:  02/05/2020            This report was finalized on 2/5/2020 10:44 PM by Dr. Homar High MD.             EKG: EKG from yesterday showed sinus rhythm no ischemia with PVCs    ECHO: Preop EF in the 20s    Tele: Sinus rhythm    Assessment   Assessment/Plan: 1 ischemic cardiomyopathy-the patient status post coronary revascularization.  He has no overt signs of CHF  2 will need a LifeVest at discharge  3 hopefully before discharge we can add Entresto or an ACE inhibitor to his regimen    Dhruv Amezquita MD  02/08/20  7:04 AM

## 2020-02-08 NOTE — PLAN OF CARE
Problem: Patient Care Overview  Goal: Plan of Care Review  Outcome: Ongoing (interventions implemented as appropriate)  Flowsheets (Taken 2/8/2020 1028)  Progress: improving  Plan of Care Reviewed With: patient  Outcome Summary: Pt ambulated 200ft with CGA, progressing to periods of SBA. VC's for increased stride length. Pt demonstrated good stability with no LOB. Pt declined additional ambulation despite increased encouragement. Continue to progress as appropriate.

## 2020-02-09 LAB
ANION GAP SERPL CALCULATED.3IONS-SCNC: 11 MMOL/L (ref 5–15)
BUN BLD-MCNC: 15 MG/DL (ref 6–20)
BUN/CREAT SERPL: 16.9 (ref 7–25)
CALCIUM SPEC-SCNC: 8.8 MG/DL (ref 8.6–10.5)
CHLORIDE SERPL-SCNC: 97 MMOL/L (ref 98–107)
CO2 SERPL-SCNC: 25 MMOL/L (ref 22–29)
CREAT BLD-MCNC: 0.89 MG/DL (ref 0.76–1.27)
GFR SERPL CREATININE-BSD FRML MDRD: 109 ML/MIN/1.73
GFR SERPL CREATININE-BSD FRML MDRD: 90 ML/MIN/1.73
GLUCOSE BLD-MCNC: 107 MG/DL (ref 65–99)
GLUCOSE BLDC GLUCOMTR-MCNC: 112 MG/DL (ref 70–130)
GLUCOSE BLDC GLUCOMTR-MCNC: 120 MG/DL (ref 70–130)
NT-PROBNP SERPL-MCNC: 3157 PG/ML (ref 5–900)
POTASSIUM BLD-SCNC: 4.1 MMOL/L (ref 3.5–5.2)
SODIUM BLD-SCNC: 133 MMOL/L (ref 136–145)

## 2020-02-09 PROCEDURE — 99024 POSTOP FOLLOW-UP VISIT: CPT | Performed by: THORACIC SURGERY (CARDIOTHORACIC VASCULAR SURGERY)

## 2020-02-09 PROCEDURE — 99232 SBSQ HOSP IP/OBS MODERATE 35: CPT | Performed by: INTERNAL MEDICINE

## 2020-02-09 PROCEDURE — 25010000002 HEPARIN (PORCINE) PER 1000 UNITS: Performed by: INTERNAL MEDICINE

## 2020-02-09 PROCEDURE — 80048 BASIC METABOLIC PNL TOTAL CA: CPT | Performed by: PHYSICIAN ASSISTANT

## 2020-02-09 PROCEDURE — 83880 ASSAY OF NATRIURETIC PEPTIDE: CPT | Performed by: INTERNAL MEDICINE

## 2020-02-09 PROCEDURE — 25010000002 HEPARIN (PORCINE) PER 1000 UNITS: Performed by: PHYSICIAN ASSISTANT

## 2020-02-09 PROCEDURE — 82962 GLUCOSE BLOOD TEST: CPT

## 2020-02-09 RX ORDER — CARVEDILOL 3.12 MG/1
3.12 TABLET ORAL EVERY 12 HOURS SCHEDULED
Status: DISCONTINUED | OUTPATIENT
Start: 2020-02-09 | End: 2020-02-11 | Stop reason: HOSPADM

## 2020-02-09 RX ADMIN — HYDROCODONE BITARTRATE AND ACETAMINOPHEN 1 TABLET: 7.5; 325 TABLET ORAL at 12:14

## 2020-02-09 RX ADMIN — OXYCODONE HYDROCHLORIDE 10 MG: 5 TABLET ORAL at 20:42

## 2020-02-09 RX ADMIN — OXYCODONE HYDROCHLORIDE 10 MG: 5 TABLET ORAL at 16:46

## 2020-02-09 RX ADMIN — ASPIRIN 325 MG: 325 TABLET, DELAYED RELEASE ORAL at 08:04

## 2020-02-09 RX ADMIN — SENNOSIDES AND DOCUSATE SODIUM 2 TABLET: 8.6; 5 TABLET ORAL at 08:04

## 2020-02-09 RX ADMIN — ATORVASTATIN CALCIUM 80 MG: 40 TABLET, FILM COATED ORAL at 20:20

## 2020-02-09 RX ADMIN — SENNOSIDES AND DOCUSATE SODIUM 2 TABLET: 8.6; 5 TABLET ORAL at 20:20

## 2020-02-09 RX ADMIN — HEPARIN SODIUM 5000 UNITS: 5000 INJECTION, SOLUTION INTRAVENOUS; SUBCUTANEOUS at 14:03

## 2020-02-09 RX ADMIN — CARVEDILOL 3.12 MG: 3.12 TABLET, FILM COATED ORAL at 20:20

## 2020-02-09 RX ADMIN — HEPARIN SODIUM 5000 UNITS: 5000 INJECTION, SOLUTION INTRAVENOUS; SUBCUTANEOUS at 20:20

## 2020-02-09 RX ADMIN — HYDROCODONE BITARTRATE AND ACETAMINOPHEN 1 TABLET: 7.5; 325 TABLET ORAL at 04:48

## 2020-02-09 RX ADMIN — HEPARIN SODIUM 5000 UNITS: 5000 INJECTION, SOLUTION INTRAVENOUS; SUBCUTANEOUS at 06:32

## 2020-02-09 RX ADMIN — CARVEDILOL 6.25 MG: 6.25 TABLET, FILM COATED ORAL at 02:20

## 2020-02-09 NOTE — PROGRESS NOTES
CTS Progress Note       LOS: 4 days   Patient Care Team:  Saul Cowan MD as PCP - General (Internal Medicine)  Darinel Martinez MD as Consulting Physician (Cardiology)  Darinel Pardo MD as Surgeon (Cardiothoracic Surgery)    Chief Complaint: Atherosclerosis of native coronary artery of native heart    Vital Signs:  Temp:  [98.3 °F (36.8 °C)-99.2 °F (37.3 °C)] 98.4 °F (36.9 °C)  Heart Rate:  [] 76  Resp:  [16-22] 22  BP: ()/(48-93) 95/49    Physical Exam: Sternal incision is satisfactory without any erythema.  Breathing unlabored.       Results:   Results from last 7 days   Lab Units 02/08/20  0343   WBC 10*3/mm3 6.92   HEMOGLOBIN g/dL 9.2*   HEMATOCRIT % 28.9*   PLATELETS 10*3/mm3 89*     Results from last 7 days   Lab Units 02/09/20  0350   SODIUM mmol/L 133*   POTASSIUM mmol/L 4.1   CHLORIDE mmol/L 97*   CO2 mmol/L 25.0   BUN mg/dL 15   CREATININE mg/dL 0.89   GLUCOSE mg/dL 107*   CALCIUM mg/dL 8.8           Imaging Results (Last 24 Hours)     Procedure Component Value Units Date/Time    XR Chest 1 View [[id number]] Collected:  02/07/20 0901     Updated:  02/08/20 2024    Narrative:       EXAMINATION: XR CHEST 1 VW- 02/07/2020     INDICATION: Post-Op Heart Surgery; I25.10-Atherosclerotic heart disease  of native coronary artery without angina pectoris     COMPARISON: 02/06/2020     FINDINGS: PA catheter tip lies in the main pulmonary segment. The heart  is enlarged. Vasculature appears normal. There is very mild bibasilar  discoid atelectasis. No pneumothorax is seen.       Impression:       1. Cardiomegaly without evidence of congestive failure.  2. Mild bibasilar discoid atelectasis.         D:  02/07/2020  E:  02/07/2020     This report was finalized on 2/8/2020 8:20 PM by Dr. Homar High MD.             Assessment      Coronary artery disease status post coronary artery bypass grafting x4.    Chronic systolic CHF (congestive heart failure) (CMS/HCC)    HTN (hypertension)    Has been  awaiting available telemetry bed for 24 hours    Plan   As stated above    Please note that portions of this note were completed with a voice recognition program. Efforts were made to edit the dictations, but occasionally words are mistranscribed.    Dhruv Uribe MD  02/09/20  7:30 AM

## 2020-02-09 NOTE — PROGRESS NOTES
INTENSIVIST   PROGRESS NOTE     Hospital:  LOS: 4 days     Mr. Ross Quintero, 54 y.o. male is followed for a Chief Complaint of: Postoperative management      Subjective   S     Interval History:  No acute events. Right scapula pain this AM.        The patient's relevant past medical, surgical and social history were reviewed and updated in Epic as appropriate.      ROS:   Constitutional: Negative for fever.   Respiratory: Positive for dyspnea.   Cardiovascular: Negative for chest pain.   Gastrointestinal: Negative for  nausea, vomiting and diarrhea.     Objective   O     Vitals:  Temp  Min: 98.2 °F (36.8 °C)  Max: 99.2 °F (37.3 °C)  BP  Min: 71/38  Max: 132/80  Pulse  Min: 75  Max: 108  Resp  Min: 16  Max: 22  SpO2  Min: 89 %  Max: 98 % Flow (L/min)  Min: 2  Max: 3    Intake/Ouptut 24 hrs (7:00AM - 6:59 AM)  Intake & Output (last 3 days)       02/06 0701 - 02/07 0700 02/07 0701 - 02/08 0700 02/08 0701 - 02/09 0700 02/09 0701 - 02/10 0700    P.O. 900 1575 1343 120    I.V. (mL/kg) 1296 (12.1) 56 (0.5)      IV Piggyback 50 250      Total Intake(mL/kg) 2246 (21) 1881 (17.6) 1343 (12.6) 120 (1.1)    Urine (mL/kg/hr) 1625 (0.6) 750 (0.3) 2625 (1)     Emesis/NG output        Chest Tube 300       Total Output 1671 954 8808     Net +321 +1131 -1282 +120            Urine Unmeasured Occurrence  1 x            Medications (drips):    niCARdipine    norepinephrine Last Rate: Stopped (02/08/20 0545)         Physical Examination  Telemetry:  Normal sinus rhythm.    Constitutional:  No acute distress.  Sitting up in bed.    Cardiovascular: Normal rate, regular and rhythm. Normal heart sounds.  No murmurs, gallop or rub.   Respiratory: No respiratory distress. Normal respiratory effort.  Normal breath sounds  Clear to ascultation   Abdominal:  Soft. No masses. Non-tender. No distension. No HSM.   Extremities: No digital cyanosis. No clubbing.  No peripheral edema.   Neurological:   Alert and Oriented to person, place, and  time.              Results from last 7 days   Lab Units 02/08/20  0343 02/07/20  0342 02/06/20  0319   WBC 10*3/mm3 6.92 9.38 8.38   HEMOGLOBIN g/dL 9.2* 9.7* 9.7*   MCV fL 96.3 97.1* 94.2   PLATELETS 10*3/mm3 89* 99* 127*     Results from last 7 days   Lab Units 02/09/20  0350 02/08/20  0343 02/07/20  0342 02/06/20  2259 02/06/20  0319 02/06/20  0000   SODIUM mmol/L 133* 133* 136 135* 141 143   POTASSIUM mmol/L 4.1 4.0 4.5 4.3 4.1 4.1   CO2 mmol/L 25.0 25.0 24.0 24.0 24.0 25.0   CREATININE mg/dL 0.89 0.87 0.89 0.89 1.03 1.03   GLUCOSE mg/dL 107* 137* 126* 138* 132* 127*   MAGNESIUM mg/dL  --   --   --  2.1 2.2 2.2   PHOSPHORUS mg/dL  --   --   --  2.7 4.0 4.0     Estimated Creatinine Clearance: 115.6 mL/min (by C-G formula based on SCr of 0.89 mg/dL).  Results from last 7 days   Lab Units 02/04/20  1535   ALK PHOS U/L 83   BILIRUBIN mg/dL 0.7   ALT (SGPT) U/L 19   AST (SGOT) U/L 19       Results from last 7 days   Lab Units 02/05/20  1617 02/05/20  1140 02/05/20  1027 02/05/20  1000 02/05/20  0933   PH, ARTERIAL pH units 7.320* 7.358 7.40 7.43 7.43   PCO2, ARTERIAL mm Hg 53.3 45.6  --   --   --    PO2 ART mm Hg 80.9* 135.0*  --   --   --    FIO2 % 40 100  --   --   --        Images:  Imaging Results (Last 24 Hours)     Procedure Component Value Units Date/Time    XR Chest 1 View [[id number]] Collected:  02/07/20 0901     Updated:  02/08/20 2024    Narrative:       EXAMINATION: XR CHEST 1 VW- 02/07/2020     INDICATION: Post-Op Heart Surgery; I25.10-Atherosclerotic heart disease  of native coronary artery without angina pectoris     COMPARISON: 02/06/2020     FINDINGS: PA catheter tip lies in the main pulmonary segment. The heart  is enlarged. Vasculature appears normal. There is very mild bibasilar  discoid atelectasis. No pneumothorax is seen.       Impression:       1. Cardiomegaly without evidence of congestive failure.  2. Mild bibasilar discoid atelectasis.         D:  02/07/2020  E:  02/07/2020     This report  was finalized on 2/8/2020 8:20 PM by Dr. Homar High MD.               Results: Reviewed.  I reviewed the patient's new laboratory and imaging results.  I independently reviewed the patient's new images.    Medications: Reviewed.    Assessment/Plan   A / P     Mr. Quintero is a 53yo M who underwent CABG x 4 by Dr. Pardo on 2/5/20.     Nutrition:   Diet Regular; Cardiac  Advance Directives:   Code Status and Medical Interventions:   Ordered at: 02/05/20 1120     Code Status:    CPR     Medical Interventions (Level of Support Prior to Arrest):    Full       Active Hospital Problems    Diagnosis   • **Coronary artery disease status post coronary artery bypass grafting x4.   • Chronic systolic CHF (congestive heart failure) (CMS/[facility])   • HTN (hypertension)       Assessment / Plan:    1. Continue SQH. Monitor platelet count.   2. D/c CVC.   3. Blood glucose well controlled.   4. Ambulate  5. AM labs  6. Okay to transfer to telemetry when a bed is available.       Plan of care and goals reviewed during interdisciplinary rounds.  I discussed the patient's findings and my recommendations with patient and nursing staff      Agnieszka Hayward, DO    Intensive Care Medicine and Pulmonary Medicine

## 2020-02-09 NOTE — PROGRESS NOTES
[facility] at [facility]  IP Progress Note      Chief Complaint/Reason for service: #1 ischemic cardiomyopathy    Subjective   Subjective: The patient is complaining of pain in his right shoulder made worse when he raises his left arm.  He denies feeling any significant shortness of breath.  His appetite is improved.  The nurse reports the blood pressures get down to the high 80s on his Coreg    Past medical, surgical, social and family history reviewed in the patient's electronic medical record.    Objective     Vital Sign Min/Max for last 24 hours  Temp  Min: 98.3 °F (36.8 °C)  Max: 99.2 °F (37.3 °C)   BP  Min: 82/50  Max: 134/81   Pulse  Min: 75  Max: 108   Resp  Min: 16  Max: 22   SpO2  Min: 89 %  Max: 98 %   Flow (L/min)  Min: 2  Max: 3      Intake/Output Summary (Last 24 hours) at 2/9/2020 0753  Last data filed at 2/9/2020 0600  Gross per 24 hour   Intake 1343 ml   Output 2625 ml   Net -1282 ml             Current Facility-Administered Medications:   •  acetaminophen (TYLENOL) tablet 650 mg, 650 mg, Oral, Q4H PRN, Darinel Pardo MD  •  aspirin EC tablet 325 mg, 325 mg, Oral, Daily, Roman Joshi PA, 325 mg at 02/08/20 0818  •  atorvastatin (LIPITOR) tablet 80 mg, 80 mg, Oral, Nightly, Cata Zuniga PA-C, 80 mg at 02/08/20 2119  •  bisacodyl (DULCOLAX) EC tablet 10 mg, 10 mg, Oral, Daily PRN, Darinel Pardo MD  •  bisacodyl (DULCOLAX) suppository 10 mg, 10 mg, Rectal, Daily PRN, Darinel Pardo MD  •  carvedilol (COREG) tablet 6.25 mg, 6.25 mg, Oral, Q12H, Darinel Martinez MD, 6.25 mg at 02/09/20 0220  •  heparin (porcine) 5000 UNIT/ML injection 5,000 Units, 5,000 Units, Subcutaneous, Q8H, Case, Agnieszka V., DO, 5,000 Units at 02/09/20 0632  •  HYDROcodone-acetaminophen (NORCO) 7.5-325 MG per tablet 1 tablet, 1 tablet, Oral, Q4H PRN, Darinel Pardo MD, 1 tablet at 02/09/20 0448  •  magnesium hydroxide (MILK OF MAGNESIA) suspension 2400 mg/10mL 10 mL, 10 mL,  Oral, Daily PRN, Darinel Pardo MD  •  morphine injection 2 mg, 2 mg, Intravenous, Q2H PRN, Darinel Pardo MD, 2 mg at 02/07/20 1950  •  naloxone (NARCAN) injection 0.2 mg, 0.2 mg, Intravenous, PRN, Darinel Pardo MD  •  niCARdipine (CARDENE) 40 mg in 200 mL NS (0.2 mg/mL) infusion, 5-15 mg/hr, Intravenous, Continuous PRN, Roman Joshi PA  •  norepinephrine (LEVOPHED) 8 mg/250 mL (32 mcg/mL) in sodium chloride 0.9% infusion (premix), 0.02-0.3 mcg/kg/min, Intravenous, Titrated, Roman Joshi PA, Stopped at 02/08/20 0545  •  ondansetron (ZOFRAN) injection 4 mg, 4 mg, Intravenous, Q6H PRN, Roman Joshi PA, 4 mg at 02/05/20 1722  •  oxyCODONE (ROXICODONE) immediate release tablet 10 mg, 10 mg, Oral, Q4H PRN, Darinel Pardo MD, 10 mg at 02/08/20 0819  •  Pharmacy Consult - [facility], , Does not apply, Daily, Shekhar Bhakta, [facility], 1 each at 02/08/20 0819  •  phenol (CHLORASEPTIC) 1.4 % liquid 2 spray, 2 spray, Mouth/Throat, Q2H PRN, Darinel Pardo MD  •  potassium chloride (MICRO-K) CR capsule 40 mEq, 40 mEq, Oral, PRN **OR** potassium chloride (KLOR-CON) packet 40 mEq, 40 mEq, Oral, PRN, Roman Joshi PA  •  sennosides-docusate (PERICOLACE) 8.6-50 MG per tablet 2 tablet, 2 tablet, Oral, BID, Darinel Pardo MD, 2 tablet at 02/08/20 2119    Physical Exam: General well-developed male who is overweight pleasant cooperative room air O2 sat drops to 90% in less than 1 minute current blood pressure 91 systolic        HEENT: No obvious JVP is present, no icterus, nasal O2 is present central line is present       Respiratory: Equal bilateral symmetrical expansion with no rhonchi wheezes or crackles       Cardiovascular: Regular rate and rhythm without murmur gallop or click and he has trace lower extremity edema to palpation       GI: Positive bowel sounds       Lower Extremities: No lesions       Neuro: Facial expressions are symmetrical he moves all 4 extremities       Skin: Warm and dry with trace  edema to palpation       Psych: Pleasant affect    Results Review: Output exceeds intake of over a liter.  Hemoglobin is 9.2.  Renal function is normal with GFR of 109.    Radiology Results:  Imaging Results (Last 72 Hours)     Procedure Component Value Units Date/Time    XR Chest 1 View [[id number]] Collected:  02/07/20 0901     Updated:  02/08/20 2024    Narrative:       EXAMINATION: XR CHEST 1 VW- 02/07/2020     INDICATION: Post-Op Heart Surgery; I25.10-Atherosclerotic heart disease  of native coronary artery without angina pectoris     COMPARISON: 02/06/2020     FINDINGS: PA catheter tip lies in the main pulmonary segment. The heart  is enlarged. Vasculature appears normal. There is very mild bibasilar  discoid atelectasis. No pneumothorax is seen.       Impression:       1. Cardiomegaly without evidence of congestive failure.  2. Mild bibasilar discoid atelectasis.         D:  02/07/2020  E:  02/07/2020     This report was finalized on 2/8/2020 8:20 PM by Dr. Homar High MD.       XR Chest 1 View [[id number]] Collected:  02/06/20 0901     Updated:  02/06/20 2234    Narrative:       EXAMINATION: XR CHEST 1 VW- 02/06/2020     INDICATION: Post-Op Heart Surgery; I25.10-Atherosclerotic heart disease  of native coronary artery without angina pectoris     COMPARISON: 02/05/2020     FINDINGS: ET tube and NG tube appear to be in good position. PA catheter  tip lies in the main pulmonary artery segment. The heart is enlarged.  The vasculature appears upper limits of normal. There appears to be  trace bibasilar discoid atelectasis, but no other new pulmonary disease  following extubation.       Impression:       Post extubation chest radiograph with very mild bibasilar  discoid atelectasis. No other new chest disease is seen.     D:  02/06/2020  E:  02/06/2020         This report was finalized on 2/6/2020 10:30 PM by Dr. Homar High MD.             EKG: On February 7 showed sinus rhythm poor R wave progression no ischemia and  PVCs    ECHO: Depressed preop EF    Tele: Sinus rhythm    Assessment   Assessment/Plan: 1 ischemic cardiomyopathy-the patient status post coronary revascularization.  Even though his lungs sound clear he still requiring oxygen at 2-1/2 L.  I will check a BNP he may need some diuretics.  2 LV dysfunction-since the patient's blood pressure is getting low I will reduce the Coreg dose to 3.125 twice daily.  Maybe his blood pressure, but I feel we can add Entresto or an ACE inhibitor    Dhruv Amezquita MD  02/09/20  7:53 AM

## 2020-02-09 NOTE — PLAN OF CARE
Problem: Patient Care Overview  Goal: Plan of Care Review  Outcome: Ongoing (interventions implemented as appropriate)  Flowsheets (Taken 2/9/2020 6864)  Progress: improving  Plan of Care Reviewed With: patient  Outcome Summary: Pt VSS. Ambulating well without difficulty. Remains on 2L ALONSO Smith RN.

## 2020-02-09 NOTE — PLAN OF CARE
Pt remains on 2L/NC, ambulated well walking 620 ft, continues to have loose productive cough, IS to 1500. Up to chair for better comfort and pulmonary toileting, said he felt pop in left shoulder complains of pain in it radiating to right scapula. Pt has orders to floor, will continue to monitor.

## 2020-02-10 ENCOUNTER — APPOINTMENT (OUTPATIENT)
Dept: CARDIOLOGY | Facility: HOSPITAL | Age: 55
End: 2020-02-10

## 2020-02-10 LAB
BH CV ECHO MEAS - BSA(HAYCOCK): 2.3 M^2
BH CV ECHO MEAS - BSA: 2.2 M^2
BH CV ECHO MEAS - BZI_BMI: 36.9 KILOGRAMS/M^2
BH CV ECHO MEAS - BZI_METRIC_HEIGHT: 172.7 CM
BH CV ECHO MEAS - BZI_METRIC_WEIGHT: 110.2 KG
BH CV ECHO MEAS - EDV(CUBED): 165.9 ML
BH CV ECHO MEAS - EDV(MOD-SP2): 224 ML
BH CV ECHO MEAS - EDV(MOD-SP4): 253 ML
BH CV ECHO MEAS - EDV(TEICH): 147.1 ML
BH CV ECHO MEAS - EF(CUBED): 42.8 %
BH CV ECHO MEAS - EF(MOD-SP2): 31.3 %
BH CV ECHO MEAS - EF(MOD-SP4): 47 %
BH CV ECHO MEAS - EF(TEICH): 35.1 %
BH CV ECHO MEAS - ESV(CUBED): 94.9 ML
BH CV ECHO MEAS - ESV(MOD-SP2): 154 ML
BH CV ECHO MEAS - ESV(MOD-SP4): 134 ML
BH CV ECHO MEAS - ESV(TEICH): 95.4 ML
BH CV ECHO MEAS - FS: 17 %
BH CV ECHO MEAS - IVS/LVPW: 1
BH CV ECHO MEAS - IVSD: 0.95 CM
BH CV ECHO MEAS - LA DIMENSION: 4.3 CM
BH CV ECHO MEAS - LV DIASTOLIC VOL/BSA (35-75): 113.9 ML/M^2
BH CV ECHO MEAS - LV MASS(C)D: 193.8 GRAMS
BH CV ECHO MEAS - LV MASS(C)DI: 87.3 GRAMS/M^2
BH CV ECHO MEAS - LV SYSTOLIC VOL/BSA (12-30): 60.4 ML/M^2
BH CV ECHO MEAS - LVIDD: 5.5 CM
BH CV ECHO MEAS - LVIDS: 4.6 CM
BH CV ECHO MEAS - LVLD AP2: 9.4 CM
BH CV ECHO MEAS - LVLD AP4: 10.4 CM
BH CV ECHO MEAS - LVLS AP2: 9.2 CM
BH CV ECHO MEAS - LVLS AP4: 8.8 CM
BH CV ECHO MEAS - LVPWD: 0.91 CM
BH CV ECHO MEAS - MV A MAX VEL: 59.8 CM/SEC
BH CV ECHO MEAS - MV DEC TIME: 0.19 SEC
BH CV ECHO MEAS - MV E MAX VEL: 103.1 CM/SEC
BH CV ECHO MEAS - MV E/A: 1.7
BH CV ECHO MEAS - SI(CUBED): 32 ML/M^2
BH CV ECHO MEAS - SI(MOD-SP2): 31.5 ML/M^2
BH CV ECHO MEAS - SI(MOD-SP4): 53.6 ML/M^2
BH CV ECHO MEAS - SI(TEICH): 23.3 ML/M^2
BH CV ECHO MEAS - SV(CUBED): 71 ML
BH CV ECHO MEAS - SV(MOD-SP2): 70 ML
BH CV ECHO MEAS - SV(MOD-SP4): 119 ML
BH CV ECHO MEAS - SV(TEICH): 51.7 ML
BH CV VAS BP LEFT ARM: NORMAL MMHG
DEPRECATED RDW RBC AUTO: 50 FL (ref 37–54)
ERYTHROCYTE [DISTWIDTH] IN BLOOD BY AUTOMATED COUNT: 14.4 % (ref 12.3–15.4)
HCT VFR BLD AUTO: 28.1 % (ref 37.5–51)
HGB BLD-MCNC: 9.5 G/DL (ref 13–17.7)
LV EF 2D ECHO EST: 30 %
MCH RBC QN AUTO: 32.2 PG (ref 26.6–33)
MCHC RBC AUTO-ENTMCNC: 33.8 G/DL (ref 31.5–35.7)
MCV RBC AUTO: 95.3 FL (ref 79–97)
PLATELET # BLD AUTO: 148 10*3/MM3 (ref 140–450)
PMV BLD AUTO: 11 FL (ref 6–12)
RBC # BLD AUTO: 2.95 10*6/MM3 (ref 4.14–5.8)
WBC NRBC COR # BLD: 6.05 10*3/MM3 (ref 3.4–10.8)

## 2020-02-10 PROCEDURE — 93321 DOPPLER ECHO F-UP/LMTD STD: CPT | Performed by: INTERNAL MEDICINE

## 2020-02-10 PROCEDURE — 25010000002 FUROSEMIDE PER 20 MG: Performed by: NURSE PRACTITIONER

## 2020-02-10 PROCEDURE — 25010000002 MORPHINE PER 10 MG: Performed by: PHYSICIAN ASSISTANT

## 2020-02-10 PROCEDURE — 25010000002 HEPARIN (PORCINE) PER 1000 UNITS: Performed by: PHYSICIAN ASSISTANT

## 2020-02-10 PROCEDURE — 93308 TTE F-UP OR LMTD: CPT | Performed by: INTERNAL MEDICINE

## 2020-02-10 PROCEDURE — 93321 DOPPLER ECHO F-UP/LMTD STD: CPT

## 2020-02-10 PROCEDURE — 93308 TTE F-UP OR LMTD: CPT

## 2020-02-10 PROCEDURE — 93325 DOPPLER ECHO COLOR FLOW MAPG: CPT

## 2020-02-10 PROCEDURE — 93325 DOPPLER ECHO COLOR FLOW MAPG: CPT | Performed by: INTERNAL MEDICINE

## 2020-02-10 PROCEDURE — 99231 SBSQ HOSP IP/OBS SF/LOW 25: CPT | Performed by: INTERNAL MEDICINE

## 2020-02-10 PROCEDURE — 25010000002 SULFUR HEXAFLUORIDE MICROSPH 60.7-25 MG RECONSTITUTED SUSPENSION: Performed by: THORACIC SURGERY (CARDIOTHORACIC VASCULAR SURGERY)

## 2020-02-10 PROCEDURE — 85027 COMPLETE CBC AUTOMATED: CPT | Performed by: PHYSICIAN ASSISTANT

## 2020-02-10 RX ORDER — ZOLPIDEM TARTRATE 5 MG/1
10 TABLET ORAL ONCE
Status: COMPLETED | OUTPATIENT
Start: 2020-02-10 | End: 2020-02-10

## 2020-02-10 RX ORDER — POTASSIUM CHLORIDE 750 MG/1
10 TABLET, FILM COATED, EXTENDED RELEASE ORAL DAILY
Qty: 30 TABLET | Refills: 11 | Status: SHIPPED | OUTPATIENT
Start: 2020-02-10 | End: 2020-02-10 | Stop reason: SDUPTHER

## 2020-02-10 RX ORDER — HYDROCODONE BITARTRATE AND ACETAMINOPHEN 7.5; 325 MG/1; MG/1
1 TABLET ORAL EVERY 6 HOURS PRN
Qty: 24 TABLET | Refills: 0
Start: 2020-02-10 | End: 2020-03-03

## 2020-02-10 RX ORDER — FUROSEMIDE 40 MG/1
40 TABLET ORAL DAILY
Qty: 30 TABLET | Refills: 11 | Status: SHIPPED | OUTPATIENT
Start: 2020-02-10 | End: 2020-03-03

## 2020-02-10 RX ORDER — FUROSEMIDE 40 MG/1
40 TABLET ORAL DAILY
Qty: 30 TABLET | Refills: 11 | Status: SHIPPED | OUTPATIENT
Start: 2020-02-10 | End: 2020-02-10 | Stop reason: SDUPTHER

## 2020-02-10 RX ORDER — FUROSEMIDE 10 MG/ML
40 INJECTION INTRAMUSCULAR; INTRAVENOUS ONCE
Status: COMPLETED | OUTPATIENT
Start: 2020-02-10 | End: 2020-02-10

## 2020-02-10 RX ORDER — POTASSIUM CHLORIDE 750 MG/1
10 TABLET, FILM COATED, EXTENDED RELEASE ORAL DAILY
Qty: 30 TABLET | Refills: 11 | Status: SHIPPED | OUTPATIENT
Start: 2020-02-10 | End: 2020-03-03

## 2020-02-10 RX ORDER — NICOTINE 21 MG/24HR
1 PATCH, TRANSDERMAL 24 HOURS TRANSDERMAL ONCE
Status: COMPLETED | OUTPATIENT
Start: 2020-02-10 | End: 2020-02-11

## 2020-02-10 RX ADMIN — CARVEDILOL 3.12 MG: 3.12 TABLET, FILM COATED ORAL at 20:45

## 2020-02-10 RX ADMIN — OXYCODONE HYDROCHLORIDE 10 MG: 5 TABLET ORAL at 03:23

## 2020-02-10 RX ADMIN — MORPHINE SULFATE 2 MG: 2 INJECTION, SOLUTION INTRAMUSCULAR; INTRAVENOUS at 20:44

## 2020-02-10 RX ADMIN — HYDROCODONE BITARTRATE AND ACETAMINOPHEN 1 TABLET: 7.5; 325 TABLET ORAL at 11:01

## 2020-02-10 RX ADMIN — ATORVASTATIN CALCIUM 80 MG: 40 TABLET, FILM COATED ORAL at 20:44

## 2020-02-10 RX ADMIN — HEPARIN SODIUM 5000 UNITS: 5000 INJECTION, SOLUTION INTRAVENOUS; SUBCUTANEOUS at 05:24

## 2020-02-10 RX ADMIN — FUROSEMIDE 40 MG: 10 INJECTION, SOLUTION INTRAMUSCULAR; INTRAVENOUS at 10:56

## 2020-02-10 RX ADMIN — CARVEDILOL 3.12 MG: 3.12 TABLET, FILM COATED ORAL at 10:53

## 2020-02-10 RX ADMIN — SULFUR HEXAFLUORIDE 3 ML: KIT at 12:00

## 2020-02-10 RX ADMIN — ZOLPIDEM TARTRATE 10 MG: 5 TABLET ORAL at 21:12

## 2020-02-10 RX ADMIN — SENNOSIDES AND DOCUSATE SODIUM 2 TABLET: 8.6; 5 TABLET ORAL at 10:53

## 2020-02-10 RX ADMIN — ASPIRIN 325 MG: 325 TABLET, DELAYED RELEASE ORAL at 10:52

## 2020-02-10 RX ADMIN — OXYCODONE HYDROCHLORIDE 10 MG: 5 TABLET ORAL at 21:12

## 2020-02-10 RX ADMIN — MAGNESIUM HYDROXIDE 10 ML: 2400 SUSPENSION ORAL at 05:34

## 2020-02-10 RX ADMIN — NICOTINE 1 PATCH: 21 PATCH, EXTENDED RELEASE TRANSDERMAL at 21:12

## 2020-02-10 NOTE — PLAN OF CARE
Problem: Patient Care Overview  Goal: Plan of Care Review  Outcome: Ongoing (interventions implemented as appropriate)  Flowsheets  Taken 2/10/2020 0350  Progress: improving  Outcome Summary: VSS, ambulated better today. Still having moderate right shoulder pain, Still on 2L NC and SOA with activity  Taken 2/9/2020 2000  Plan of Care Reviewed With: patient;spouse

## 2020-02-10 NOTE — PROGRESS NOTES
"  [facility] at [facility]   Inpatient Progress Note       LOS: 5 days   Patient Care Team:  Saul Cowan MD as PCP - General (Internal Medicine)  Darinel Martinez MD as Consulting Physician (Cardiology)  Darinel Pardo MD as Surgeon (Cardiothoracic Surgery)    Chief Complaint:  Follow-up for CHF and ICM    Subjective     Interval History:   Patient had BM this AM. No current CV complaints. Hoping to go home.  Ambulated in hallway 3 laps today without symptoms.      Review of Systems:   Pertinent positives noted in history, exam, and assessment. Otherwise reviewed and negative.      Objective     Vitals:  Blood pressure 110/80, pulse 84, temperature 98.8 °F (37.1 °C), temperature source Oral, resp. rate 18, height 172.7 cm (67.99\"), weight 110 kg (243 lb 9.6 oz), SpO2 95 %.     Intake/Output Summary (Last 24 hours) at 2/10/2020 0828  Last data filed at 2/10/2020 0329  Gross per 24 hour   Intake 240 ml   Output 3050 ml   Net -2810 ml     Physical Exam   Constitutional: He is oriented to person, place, and time. He appears well-developed and well-nourished.   Neck: No JVD present. No tracheal deviation present.   Cardiovascular: Normal rate, regular rhythm, normal heart sounds and intact distal pulses. Exam reveals no friction rub.   No murmur heard.  Pulmonary/Chest: Effort normal. No respiratory distress. He has no rales.        Abdominal: Soft. Bowel sounds are normal. There is no tenderness.   Musculoskeletal: He exhibits edema (trace to 1+ ble). He exhibits no deformity. Tenderness: geo.   Neurological: He is alert and oriented to person, place, and time.   Skin: Skin is warm and dry.        I have examined the patient and agree with the above  Results Review:     I reviewed the patient's new clinical results.    Results from last 7 days   Lab Units 02/10/20  0425   WBC 10*3/mm3 6.05   HEMOGLOBIN g/dL 9.5*   HEMATOCRIT % 28.1*   PLATELETS 10*3/mm3 148     Results from last 7 days   Lab " Units 02/09/20  0350  02/04/20  1535   SODIUM mmol/L 133*   < > 139   POTASSIUM mmol/L 4.1   < > 4.2   CHLORIDE mmol/L 97*   < > 101   CO2 mmol/L 25.0   < > 25.0   BUN mg/dL 15   < > 12   CREATININE mg/dL 0.89   < > 0.86   CALCIUM mg/dL 8.8   < > 9.3   BILIRUBIN mg/dL  --   --  0.7   ALK PHOS U/L  --   --  83   ALT (SGPT) U/L  --   --  19   AST (SGOT) U/L  --   --  19   GLUCOSE mg/dL 107*   < > 84    < > = values in this interval not displayed.     Results from last 7 days   Lab Units 02/09/20  0350   SODIUM mmol/L 133*   POTASSIUM mmol/L 4.1   CHLORIDE mmol/L 97*   CO2 mmol/L 25.0   BUN mg/dL 15   CREATININE mg/dL 0.89   GLUCOSE mg/dL 107*   CALCIUM mg/dL 8.8     Results from last 7 days   Lab Units 02/06/20  0319 02/05/20  1134 02/04/20  1535   INR  1.38* 1.47* 1.09     No results found for: TROPONINI          Results from last 7 days   Lab Units 02/09/20  0810   PROBNP pg/mL 3,157.0*         Tele:  SR    Assessment/Plan       Coronary artery disease status post coronary artery bypass grafting x4.    Chronic systolic CHF (congestive heart failure) (CMS/[facility])    HTN (hypertension)      1. Ischemic CM/MVCAD  - LVEF 25% pre-op   - CABG x4 2/5/2020     2. SHF  - will need to resume Entresto  - currently getting low dose lopressor       3. HLD  -   - Lipitor to 80mg     Plan:  · Lasix 40 mg IV today.  · Echo showed LVEF 30%.  · Recommend LifeVest at hospital discharge.  · Once LifeVest arranged, could consider discharge later this afternoon.    GRACE Pink       I have seen and examined the patient, I have reviewed the note, discussed the case with the advance practice clinician, made necessary changes and I agree with the final note.    Darinel Martinez MD  02/10/20  12:44 PM          Dictated utilizing Dragon dictation

## 2020-02-10 NOTE — PROGRESS NOTES
"Ross Quintero  [id number]  1965     LOS: 5 days   Patient Care Team:  Saul Cowan MD as PCP - General (Internal Medicine)  Darinel Martinez MD as Consulting Physician (Cardiology)  Darinel Padro MD as Surgeon (Cardiothoracic Surgery)    Chief Complaint: Coronary artery disease      Subjective: No complaints    Objective:     Vital Sign Min/Max for last 24 hours  Temp  Min: 97.8 °F (36.6 °C)  Max: 98.8 °F (37.1 °C)   BP  Min: 71/38  Max: 114/67   Pulse  Min: 75  Max: 97   Resp  Min: 18  Max: 22   SpO2  Min: 90 %  Max: 98 %   No data recorded   Weight  Min: 110 kg (243 lb 6.4 oz)  Max: 110 kg (243 lb 9.6 oz)     Flowsheet Rows      First Filed Value   Admission Height  172.7 cm (67.99\") Documented at 02/06/2020 2321   Admission Weight  107 kg (236 lb 15.9 oz) Documented at 02/06/2020 2321          Physical Exam:    Wound: Satisfactory    Pulses:     Mediastinal and Chest Tube Drainage:       Results Review:   Results from last 7 days   Lab Units 02/10/20  0425   WBC 10*3/mm3 6.05   HEMOGLOBIN g/dL 9.5*   HEMATOCRIT % 28.1*   PLATELETS 10*3/mm3 148     Results from last 7 days   Lab Units 02/09/20  0350   SODIUM mmol/L 133*   POTASSIUM mmol/L 4.1   CHLORIDE mmol/L 97*   CO2 mmol/L 25.0   BUN mg/dL 15   CREATININE mg/dL 0.89   GLUCOSE mg/dL 107*   CALCIUM mg/dL 8.8     Results from last 7 days   Lab Units 02/05/20  1617   PH, ARTERIAL pH units 7.320*   PO2 ART mm Hg 80.9*   PCO2, ARTERIAL mm Hg 53.3   HCO3 ART mmol/L 27.4*         Assessment      Coronary artery disease status post coronary artery bypass grafting x4.    Chronic systolic CHF (congestive heart failure) (CMS/[facility])    HTN (hypertension)      Discharge when all agree        Darinel Pardo MD  02/10/20  6:54 AM      Please note that portions of this note were completed with a voice recognition program. Efforts were made to edit the dictations, but words may be mistranscribed  "

## 2020-02-10 NOTE — PROGRESS NOTES
Case Management Discharge Note      Final Note: Pt's plan is to dc to home with family. Cardiac Rehab has been set up through [facility]. Voices no needs at present. Family to transport.          Destination      No service has been selected for the patient.      Durable Medical Equipment      No service has been selected for the patient.      Dialysis/Infusion      No service has been selected for the patient.      Home Medical Care      No service has been selected for the patient.      Therapy      No service has been selected for the patient.      Community Resources      No service has been selected for the patient.             Final Discharge Disposition Code: 01 - home or self-care

## 2020-02-10 NOTE — PLAN OF CARE
Problem: Patient Care Overview  Goal: Plan of Care Review  Outcome: Outcome(s) achieved  Note:   Pt rested well this shift with discomfort of right shoulder relieved and PRN pain med given x 1. Still awaiting insurance approval for the life vest and will be ready for discharge. VSS. No acute distress noted this shift.

## 2020-02-10 NOTE — PAYOR COMM NOTE
"Deyanira Perez RN  Utilization Review  P: [phone]  F: [phone]    Ref # [id number]  DC date = 2/10/2020  Updated clinicals      LeonMaxim (54 y.o. Male)     Date of Birth Social Security Number Address Home Phone MRN    1965  [address]  [facility] [address]  [id number]    Yazidi Marital Status          Rastafarian        Admission Date Admission Type Admitting Provider Attending Provider Department, Room/Bed    2/5/20 Elective Darinel Pardo MD Rogers, Anthony G, MD [facility] 4G, S464/1    Discharge Date Discharge Disposition Discharge Destination         Home or Self Care              Attending Provider:  Darinel Pardo MD    Allergies:  No Known Allergies    Isolation:  None   Infection:  None   Code Status:  CPR    Ht:  172.7 cm (67.99\")   Wt:  110 kg (242 lb 3.2 oz)    Admission Cmt:  None   Principal Problem:  Coronary artery disease status post coronary artery bypass grafting x4. [I25.10]                 Active Insurance as of 2/5/2020     Primary Coverage     Payor Plan Insurance Group Employer/Plan Group    [employer]HE[employer] BLUE CROSS AN[employer] BLUE CROSS BLUE [employer]IELD PPO [id number]     Payor Plan Address Payor Plan Phone Number Payor Plan Fax Number Effective Dates    [address] [phone]  1/1/2008 - None Entered    [place] [address]       Subscriber Name Subscriber Birth Date Member ID       MAXIM BENSON 1965 [id number]                 Emergency Contacts      (Rel.) Home Phone Work Phone Mobile Phone    gabe benson (Spouse) [phone] -- --               Physician Progress Notes (last 48 hours) (Notes from 02/08/20 1103 through 02/10/20 1103)      Radha Santillan APRN at 02/10/20 0828            [facility] at [facility]   Inpatient Progress Note       LOS: 5 days   Patient Care Team:  Saul Cowan MD as PCP - General (Internal Medicine)  Darinel Martinez MD as Consulting Physician " "(Cardiology)  Darinel Pardo MD as Surgeon (Cardiothoracic Surgery)    Chief Complaint:  Follow-up for CHF and ICM    Subjective     Interval History:   Patient had BM this AM. No current CV complaints. Hoping to go home      Review of Systems:   Pertinent positives noted in history, exam, and assessment. Otherwise reviewed and negative.      Objective     Vitals:  Blood pressure 110/80, pulse 84, temperature 98.8 °F (37.1 °C), temperature source Oral, resp. rate 18, height 172.7 cm (67.99\"), weight 110 kg (243 lb 9.6 oz), SpO2 95 %.     Intake/Output Summary (Last 24 hours) at 2/10/2020 0828  Last data filed at 2/10/2020 0329  Gross per 24 hour   Intake 240 ml   Output 3050 ml   Net -2810 ml     Physical Exam   Constitutional: He is oriented to person, place, and time. He appears well-developed and well-nourished.   Neck: No JVD present. No tracheal deviation present.   Cardiovascular: Normal rate, regular rhythm, normal heart sounds and intact distal pulses. Exam reveals no friction rub.   No murmur heard.  Pulmonary/Chest: Effort normal. No respiratory distress. He has no rales.   Decreased bases     Abdominal: Soft. Bowel sounds are normal. There is no tenderness.   Musculoskeletal: He exhibits edema (trace to 1+ ble). He exhibits no deformity. Tenderness: geo.   Neurological: He is alert and oriented to person, place, and time.   Skin: Skin is warm and dry.          Results Review:     I reviewed the patient's new clinical results.    Results from last 7 days   Lab Units 02/10/20  0425   WBC 10*3/mm3 6.05   HEMOGLOBIN g/dL 9.5*   HEMATOCRIT % 28.1*   PLATELETS 10*3/mm3 148     Results from last 7 days   Lab Units 02/09/20  0350  02/04/20  1535   SODIUM mmol/L 133*   < > 139   POTASSIUM mmol/L 4.1   < > 4.2   CHLORIDE mmol/L 97*   < > 101   CO2 mmol/L 25.0   < > 25.0   BUN mg/dL 15   < > 12   CREATININE mg/dL 0.89   < > 0.86   CALCIUM mg/dL 8.8   < > 9.3   BILIRUBIN mg/dL  --   --  0.7   ALK PHOS U/L  --   " --  83   ALT (SGPT) U/L  --   --  19   AST (SGOT) U/L  --   --  19   GLUCOSE mg/dL 107*   < > 84    < > = values in this interval not displayed.     Results from last 7 days   Lab Units 02/09/20  0350   SODIUM mmol/L 133*   POTASSIUM mmol/L 4.1   CHLORIDE mmol/L 97*   CO2 mmol/L 25.0   BUN mg/dL 15   CREATININE mg/dL 0.89   GLUCOSE mg/dL 107*   CALCIUM mg/dL 8.8     Results from last 7 days   Lab Units 02/06/20  0319 02/05/20  1134 02/04/20  1535   INR  1.38* 1.47* 1.09     No results found for: TROPONINI          Results from last 7 days   Lab Units 02/09/20  0810   PROBNP pg/mL 3,157.0*         Tele:  SR    Assessment/Plan       Coronary artery disease status post coronary artery bypass grafting x4.    Chronic systolic CHF (congestive heart failure) (CMS/HCC)    HTN (hypertension)      1. Ischemic CM/MVCAD  - LVEF 25% pre-op   - CABG x4 2/5/2020     2. SHF  - will need to resume Entresto  - currently getting low dose lopressor       3. HLD  -   - Lipitor to 80mg     Plan:  · Lasix 40 mg IV today.  · Limited echo to assess LVEF  · Possible life vest (will determine after echo)  · Pending results could consider discharge later this afternoon.    GRACE Pink           Dictated utilizing Dragon dictation          Electronically signed by Radha Santillan APRN at 02/10/20 0857     Darinel Pardo MD at 02/10/20 0654          Ross Quintero  [id number]  1965     LOS: 5 days   Patient Care Team:  Saul Cowan MD as PCP - General (Internal Medicine)  Darinel Martinez MD as Consulting Physician (Cardiology)  Darinel Pardo MD as Surgeon (Cardiothoracic Surgery)    Chief Complaint: Coronary artery disease      Subjective: No complaints    Objective:     Vital Sign Min/Max for last 24 hours  Temp  Min: 97.8 °F (36.6 °C)  Max: 98.8 °F (37.1 °C)   BP  Min: 71/38  Max: 114/67   Pulse  Min: 75  Max: 97   Resp  Min: 18  Max: 22   SpO2  Min: 90 %  Max: 98 %   No data recorded   Weight   "Min: 110 kg (243 lb 6.4 oz)  Max: 110 kg (243 lb 9.6 oz)     Flowsheet Rows      First Filed Value   Admission Height  172.7 cm (67.99\") Documented at 02/06/2020 2321   Admission Weight  107 kg (236 lb 15.9 oz) Documented at 02/06/2020 2321          Physical Exam:    Wound: Satisfactory    Pulses:     Mediastinal and Chest Tube Drainage:       Results Review:   Results from last 7 days   Lab Units 02/10/20  0425   WBC 10*3/mm3 6.05   HEMOGLOBIN g/dL 9.5*   HEMATOCRIT % 28.1*   PLATELETS 10*3/mm3 148     Results from last 7 days   Lab Units 02/09/20  0350   SODIUM mmol/L 133*   POTASSIUM mmol/L 4.1   CHLORIDE mmol/L 97*   CO2 mmol/L 25.0   BUN mg/dL 15   CREATININE mg/dL 0.89   GLUCOSE mg/dL 107*   CALCIUM mg/dL 8.8     Results from last 7 days   Lab Units 02/05/20  1617   PH, ARTERIAL pH units 7.320*   PO2 ART mm Hg 80.9*   PCO2, ARTERIAL mm Hg 53.3   HCO3 ART mmol/L 27.4*         Assessment      Coronary artery disease status post coronary artery bypass grafting x4.    Chronic systolic CHF (congestive heart failure) (CMS/[facility])    HTN (hypertension)      Discharge when all agree        Darinel Pardo MD  02/10/20  6:54 AM      Please note that portions of this note were completed with a voice recognition program. Efforts were made to edit the dictations, but words may be mistranscribed    Electronically signed by Darinel Pardo MD at 02/10/20 0655     Agnieszka Hayward DO at 02/09/20 1051          INTENSIVIST   PROGRESS NOTE     Hospital:  LOS: 4 days     Mr. Ross Quintero, 54 y.o. male is followed for a Chief Complaint of: Postoperative management      Subjective   S     Interval History:  No acute events. Right scapula pain this AM.        The patient's relevant past medical, surgical and social history were reviewed and updated in Epic as appropriate.      ROS:   Constitutional: Negative for fever.   Respiratory: Positive for dyspnea.   Cardiovascular: Negative for chest pain.   Gastrointestinal: " Negative for  nausea, vomiting and diarrhea.     Objective   O     Vitals:  Temp  Min: 98.2 °F (36.8 °C)  Max: 99.2 °F (37.3 °C)  BP  Min: 71/38  Max: 132/80  Pulse  Min: 75  Max: 108  Resp  Min: 16  Max: 22  SpO2  Min: 89 %  Max: 98 % Flow (L/min)  Min: 2  Max: 3    Intake/Ouptut 24 hrs (7:00AM - 6:59 AM)  Intake & Output (last 3 days)       02/06 0701 - 02/07 0700 02/07 0701 - 02/08 0700 02/08 0701 - 02/09 0700 02/09 0701 - 02/10 0700    P.O. 900 1575 1343 120    I.V. (mL/kg) 1296 (12.1) 56 (0.5)      IV Piggyback 50 250      Total Intake(mL/kg) 2246 (21) 1881 (17.6) 1343 (12.6) 120 (1.1)    Urine (mL/kg/hr) 1625 (0.6) 750 (0.3) 2625 (1)     Emesis/NG output        Chest Tube 300       Total Output 6090 297 5208     Net +321 +1131 -1282 +120            Urine Unmeasured Occurrence  1 x            Medications (drips):    niCARdipine    norepinephrine Last Rate: Stopped (02/08/20 0545)         Physical Examination  Telemetry:  Normal sinus rhythm.    Constitutional:  No acute distress.  Sitting up in bed.    Cardiovascular: Normal rate, regular and rhythm. Normal heart sounds.  No murmurs, gallop or rub.   Respiratory: No respiratory distress. Normal respiratory effort.  Normal breath sounds  Clear to ascultation   Abdominal:  Soft. No masses. Non-tender. No distension. No HSM.   Extremities: No digital cyanosis. No clubbing.  No peripheral edema.   Neurological:   Alert and Oriented to person, place, and time.              Results from last 7 days   Lab Units 02/08/20  0343 02/07/20  0342 02/06/20  0319   WBC 10*3/mm3 6.92 9.38 8.38   HEMOGLOBIN g/dL 9.2* 9.7* 9.7*   MCV fL 96.3 97.1* 94.2   PLATELETS 10*3/mm3 89* 99* 127*     Results from last 7 days   Lab Units 02/09/20  0350 02/08/20  0343 02/07/20  0342 02/06/20  2259 02/06/20  0319 02/06/20  0000   SODIUM mmol/L 133* 133* 136 135* 141 143   POTASSIUM mmol/L 4.1 4.0 4.5 4.3 4.1 4.1   CO2 mmol/L 25.0 25.0 24.0 24.0 24.0 25.0   CREATININE mg/dL 0.89 0.87 0.89 0.89  1.03 1.03   GLUCOSE mg/dL 107* 137* 126* 138* 132* 127*   MAGNESIUM mg/dL  --   --   --  2.1 2.2 2.2   PHOSPHORUS mg/dL  --   --   --  2.7 4.0 4.0     Estimated Creatinine Clearance: 115.6 mL/min (by C-G formula based on SCr of 0.89 mg/dL).  Results from last 7 days   Lab Units 02/04/20  1535   ALK PHOS U/L 83   BILIRUBIN mg/dL 0.7   ALT (SGPT) U/L 19   AST (SGOT) U/L 19       Results from last 7 days   Lab Units 02/05/20  1617 02/05/20  1140 02/05/20  1027 02/05/20  1000 02/05/20  0933   PH, ARTERIAL pH units 7.320* 7.358 7.40 7.43 7.43   PCO2, ARTERIAL mm Hg 53.3 45.6  --   --   --    PO2 ART mm Hg 80.9* 135.0*  --   --   --    FIO2 % 40 100  --   --   --        Images:  Imaging Results (Last 24 Hours)     Procedure Component Value Units Date/Time    XR Chest 1 View [[id number]] Collected:  02/07/20 0901     Updated:  02/08/20 2024    Narrative:       EXAMINATION: XR CHEST 1 VW- 02/07/2020     INDICATION: Post-Op Heart Surgery; I25.10-Atherosclerotic heart disease  of native coronary artery without angina pectoris     COMPARISON: 02/06/2020     FINDINGS: PA catheter tip lies in the main pulmonary segment. The heart  is enlarged. Vasculature appears normal. There is very mild bibasilar  discoid atelectasis. No pneumothorax is seen.       Impression:       1. Cardiomegaly without evidence of congestive failure.  2. Mild bibasilar discoid atelectasis.         D:  02/07/2020  E:  02/07/2020     This report was finalized on 2/8/2020 8:20 PM by Dr. Homar High MD.               Results: Reviewed.  I reviewed the patient's new laboratory and imaging results.  I independently reviewed the patient's new images.    Medications: Reviewed.    Assessment/Plan   A / P     Mr. Quintero is a 53yo M who underwent CABG x 4 by Dr. Pardo on 2/5/20.     Nutrition:   Diet Regular; Cardiac  Advance Directives:   Code Status and Medical Interventions:   Ordered at: 02/05/20 1120     Code Status:    CPR     Medical Interventions (Level of  Support Prior to Arrest):    Full       Active Hospital Problems    Diagnosis   • **Coronary artery disease status post coronary artery bypass grafting x4.   • Chronic systolic CHF (congestive heart failure) (CMS/[facility])   • HTN (hypertension)       Assessment / Plan:    1. Continue SQH. Monitor platelet count.   2. D/c CVC.   3. Blood glucose well controlled.   4. Ambulate  5. AM labs  6. Okay to transfer to telemetry when a bed is available.       Plan of care and goals reviewed during interdisciplinary rounds.  I discussed the patient's findings and my recommendations with patient and nursing staff      Agnieszka Hayward DO    Intensive Care Medicine and Pulmonary Medicine       Electronically signed by Agnieszka Hayward DO at 02/09/20 1052     Dhruv Amezquita MD at 02/09/20 0464          [facility] at [facility]  IP Progress Note      Chief Complaint/Reason for service: #1 ischemic cardiomyopathy    Subjective   Subjective: The patient is complaining of pain in his right shoulder made worse when he raises his left arm.  He denies feeling any significant shortness of breath.  His appetite is improved.  The nurse reports the blood pressures get down to the high 80s on his Coreg    Past medical, surgical, social and family history reviewed in the patient's electronic medical record.    Objective     Vital Sign Min/Max for last 24 hours  Temp  Min: 98.3 °F (36.8 °C)  Max: 99.2 °F (37.3 °C)   BP  Min: 82/50  Max: 134/81   Pulse  Min: 75  Max: 108   Resp  Min: 16  Max: 22   SpO2  Min: 89 %  Max: 98 %   Flow (L/min)  Min: 2  Max: 3      Intake/Output Summary (Last 24 hours) at 2/9/2020 0753  Last data filed at 2/9/2020 0600  Gross per 24 hour   Intake 1343 ml   Output 2625 ml   Net -1282 ml             Current Facility-Administered Medications:   •  acetaminophen (TYLENOL) tablet 650 mg, 650 mg, Oral, Q4H PRN, Darinel Pardo MD  •  aspirin EC tablet 325 mg, 325 mg, Oral, Daily, Roman Joshi  KAREN MELENDEZ, 325 mg at 02/08/20 0818  •  atorvastatin (LIPITOR) tablet 80 mg, 80 mg, Oral, Nightly, Cata Zuniga PA-C, 80 mg at 02/08/20 2119  •  bisacodyl (DULCOLAX) EC tablet 10 mg, 10 mg, Oral, Daily PRN, Darinel Pardo MD  •  bisacodyl (DULCOLAX) suppository 10 mg, 10 mg, Rectal, Daily PRN, Darinel Pardo MD  •  carvedilol (COREG) tablet 6.25 mg, 6.25 mg, Oral, Q12H, Darinel Martinez MD, 6.25 mg at 02/09/20 0220  •  heparin (porcine) 5000 UNIT/ML injection 5,000 Units, 5,000 Units, Subcutaneous, Q8H, Case, Agnieszka V., DO, 5,000 Units at 02/09/20 0632  •  HYDROcodone-acetaminophen (NORCO) 7.5-325 MG per tablet 1 tablet, 1 tablet, Oral, Q4H PRN, Darinel Pardo MD, 1 tablet at 02/09/20 0448  •  magnesium hydroxide (MILK OF MAGNESIA) suspension 2400 mg/10mL 10 mL, 10 mL, Oral, Daily PRN, Darinel Pardo MD  •  morphine injection 2 mg, 2 mg, Intravenous, Q2H PRN, Darinel Pardo MD, 2 mg at 02/07/20 1950  •  naloxone (NARCAN) injection 0.2 mg, 0.2 mg, Intravenous, PRN, Darinel Pardo MD  •  niCARdipine (CARDENE) 40 mg in 200 mL NS (0.2 mg/mL) infusion, 5-15 mg/hr, Intravenous, Continuous PRN, Roman Joshi PA  •  norepinephrine (LEVOPHED) 8 mg/250 mL (32 mcg/mL) in sodium chloride 0.9% infusion (premix), 0.02-0.3 mcg/kg/min, Intravenous, Titrated, Roman Joshi PA, Stopped at 02/08/20 0545  •  ondansetron (ZOFRAN) injection 4 mg, 4 mg, Intravenous, Q6H PRN, Roman Joshi PA, 4 mg at 02/05/20 1722  •  oxyCODONE (ROXICODONE) immediate release tablet 10 mg, 10 mg, Oral, Q4H PRN, Darinel Pardo MD, 10 mg at 02/08/20 0819  •  Pharmacy Consult - [facility], , Does not apply, Daily, Shekhar Bhakta, RP, 1 each at 02/08/20 0819  •  phenol (CHLORASEPTIC) 1.4 % liquid 2 spray, 2 spray, Mouth/Throat, Q2H PRN, Darinel Pardo MD  •  potassium chloride (MICRO-K) CR capsule 40 mEq, 40 mEq, Oral, PRN **OR** potassium chloride (KLOR-CON) packet 40 mEq, 40 mEq, Oral, PRN, Roman Joshi PA  •   sennosides-docusate (PERICOLACE) 8.6-50 MG per tablet 2 tablet, 2 tablet, Oral, BID, Darinel Pardo MD, 2 tablet at 02/08/20 2119    Physical Exam: General well-developed male who is overweight pleasant cooperative room air O2 sat drops to 90% in less than 1 minute current blood pressure 91 systolic        HEENT: No obvious JVP is present, no icterus, nasal O2 is present central line is present       Respiratory: Equal bilateral symmetrical expansion with no rhonchi wheezes or crackles       Cardiovascular: Regular rate and rhythm without murmur gallop or click and he has trace lower extremity edema to palpation       GI: Positive bowel sounds       Lower Extremities: No lesions       Neuro: Facial expressions are symmetrical he moves all 4 extremities       Skin: Warm and dry with trace edema to palpation       Psych: Pleasant affect    Results Review: Output exceeds intake of over a liter.  Hemoglobin is 9.2.  Renal function is normal with GFR of 109.    Radiology Results:  Imaging Results (Last 72 Hours)     Procedure Component Value Units Date/Time    XR Chest 1 View [[id number]] Collected:  02/07/20 0901     Updated:  02/08/20 2024    Narrative:       EXAMINATION: XR CHEST 1 VW- 02/07/2020     INDICATION: Post-Op Heart Surgery; I25.10-Atherosclerotic heart disease  of native coronary artery without angina pectoris     COMPARISON: 02/06/2020     FINDINGS: PA catheter tip lies in the main pulmonary segment. The heart  is enlarged. Vasculature appears normal. There is very mild bibasilar  discoid atelectasis. No pneumothorax is seen.       Impression:       1. Cardiomegaly without evidence of congestive failure.  2. Mild bibasilar discoid atelectasis.         D:  02/07/2020  E:  02/07/2020     This report was finalized on 2/8/2020 8:20 PM by Dr. Homar High MD.       XR Chest 1 View [[id number]] Collected:  02/06/20 0901     Updated:  02/06/20 2234    Narrative:       EXAMINATION: XR CHEST 1 VW- 02/06/2020      INDICATION: Post-Op Heart Surgery; I25.10-Atherosclerotic heart disease  of native coronary artery without angina pectoris     COMPARISON: 02/05/2020     FINDINGS: ET tube and NG tube appear to be in good position. PA catheter  tip lies in the main pulmonary artery segment. The heart is enlarged.  The vasculature appears upper limits of normal. There appears to be  trace bibasilar discoid atelectasis, but no other new pulmonary disease  following extubation.       Impression:       Post extubation chest radiograph with very mild bibasilar  discoid atelectasis. No other new chest disease is seen.     D:  02/06/2020  E:  02/06/2020         This report was finalized on 2/6/2020 10:30 PM by Dr. Homar High MD.             EKG: On February 7 showed sinus rhythm poor R wave progression no ischemia and PVCs    ECHO: Depressed preop EF    Tele: Sinus rhythm    Assessment   Assessment/Plan: 1 ischemic cardiomyopathy-the patient status post coronary revascularization.  Even though his lungs sound clear he still requiring oxygen at 2-1/2 L.  I will check a BNP he may need some diuretics.  2 LV dysfunction-since the patient's blood pressure is getting low I will reduce the Coreg dose to 3.125 twice daily.  Maybe his blood pressure, but I feel we can add Entresto or an ACE inhibitor    Dhruv Amezquita MD  02/09/20  7:53 AM      Electronically signed by Dhruv Amezquita MD at 02/09/20 0801     Dhruv Uribe MD at 02/09/20 0730          CTS Progress Note       LOS: 4 days   Patient Care Team:  Saul Cowan MD as PCP - General (Internal Medicine)  Darinel Martinez MD as Consulting Physician (Cardiology)  Darinel Pardo MD as Surgeon (Cardiothoracic Surgery)    Chief Complaint: Atherosclerosis of native coronary artery of native heart    Vital Signs:  Temp:  [98.3 °F (36.8 °C)-99.2 °F (37.3 °C)] 98.4 °F (36.9 °C)  Heart Rate:  [] 76  Resp:  [16-22] 22  BP: ()/(48-93) 95/49    Physical Exam:  Sternal incision is satisfactory without any erythema.  Breathing unlabored.       Results:   Results from last 7 days   Lab Units 02/08/20  0343   WBC 10*3/mm3 6.92   HEMOGLOBIN g/dL 9.2*   HEMATOCRIT % 28.9*   PLATELETS 10*3/mm3 89*     Results from last 7 days   Lab Units 02/09/20  0350   SODIUM mmol/L 133*   POTASSIUM mmol/L 4.1   CHLORIDE mmol/L 97*   CO2 mmol/L 25.0   BUN mg/dL 15   CREATININE mg/dL 0.89   GLUCOSE mg/dL 107*   CALCIUM mg/dL 8.8           Imaging Results (Last 24 Hours)     Procedure Component Value Units Date/Time    XR Chest 1 View [[id number]] Collected:  02/07/20 0901     Updated:  02/08/20 2024    Narrative:       EXAMINATION: XR CHEST 1 VW- 02/07/2020     INDICATION: Post-Op Heart Surgery; I25.10-Atherosclerotic heart disease  of native coronary artery without angina pectoris     COMPARISON: 02/06/2020     FINDINGS: PA catheter tip lies in the main pulmonary segment. The heart  is enlarged. Vasculature appears normal. There is very mild bibasilar  discoid atelectasis. No pneumothorax is seen.       Impression:       1. Cardiomegaly without evidence of congestive failure.  2. Mild bibasilar discoid atelectasis.         D:  02/07/2020  E:  02/07/2020     This report was finalized on 2/8/2020 8:20 PM by Dr. Homar High MD.             Assessment      Coronary artery disease status post coronary artery bypass grafting x4.    Chronic systolic CHF (congestive heart failure) (CMS/HCC)    HTN (hypertension)    Has been awaiting available telemetry bed for 24 hours    Plan   As stated above    Please note that portions of this note were completed with a voice recognition program. Efforts were made to edit the dictations, but occasionally words are mistranscribed.    Dhruv Uribe MD  02/09/20  7:30 AM        Electronically signed by Dhruv Uribe MD at 02/09/20 5520     Agnieszka Hayward DO at 02/08/20 1155          INTENSIVIST   PROGRESS NOTE     Hospital:  LOS: 3 days     Mr. Ross Steele  Leon, 54 y.o. male is followed for a Chief Complaint of: Postoperative management      Subjective   S     Interval History:  No acute events. Breathing better this AM. Pain better controlled.        The patient's relevant past medical, surgical and social history were reviewed and updated in Epic as appropriate.      ROS:   Constitutional: Negative for fever.   Respiratory: Positive for dyspnea.   Cardiovascular: Negative for chest pain.   Gastrointestinal: Negative for  nausea, vomiting and diarrhea.     Objective   O     Vitals:  Temp  Min: 98.2 °F (36.8 °C)  Max: 98.5 °F (36.9 °C)  BP  Min: 77/61  Max: 135/64  Pulse  Min: 83  Max: 102  Resp  Min: 18  Max: 20  SpO2  Min: 89 %  Max: 99 % Flow (L/min)  Min: 3  Max: 3    Intake/Ouptut 24 hrs (7:00AM - 6:59 AM)  Intake & Output (last 3 days)       02/05 0701 - 02/06 0700 02/06 0701 - 02/07 0700 02/07 0701 - 02/08 0700 02/08 0701 - 02/09 0700    P.O.  900 1575 268    I.V. (mL/kg) 4051 1296 (12.1) 56 (0.5)     IV Piggyback 1250 50 250     Total Intake(mL/kg) 5301 2246 (21) 1881 (17.6) 268 (2.5)    Urine (mL/kg/hr) 5515 1625 (0.6) 750 (0.3)     Emesis/NG output 40       Chest Tube 630 300      Total Output 6185 1925 750     Net -884 +321 +1131 +268            Urine Unmeasured Occurrence   1 x           Medications (drips):    niCARdipine    norepinephrine Last Rate: Stopped (02/08/20 0545)         Physical Examination  Telemetry:  Normal sinus rhythm.    Constitutional:  No acute distress.  Sitting up in a chair.    Cardiovascular: Normal rate, regular and rhythm. Normal heart sounds.  No murmurs, gallop or rub.   Respiratory: No respiratory distress. Normal respiratory effort.  Normal breath sounds  Clear to ascultation   Abdominal:  Soft. No masses. Non-tender. No distension. No HSM.   Extremities: No digital cyanosis. No clubbing.  No peripheral edema.   Neurological:   Alert and Oriented to person, place, and time.              Results from last 7 days   Lab  Units 02/08/20  0343 02/07/20  0342 02/06/20  0319   WBC 10*3/mm3 6.92 9.38 8.38   HEMOGLOBIN g/dL 9.2* 9.7* 9.7*   MCV fL 96.3 97.1* 94.2   PLATELETS 10*3/mm3 89* 99* 127*     Results from last 7 days   Lab Units 02/08/20  0343 02/07/20  0342 02/06/20  2259 02/06/20  0319 02/06/20  0000   SODIUM mmol/L 133* 136 135* 141 143   POTASSIUM mmol/L 4.0 4.5 4.3 4.1 4.1   CO2 mmol/L 25.0 24.0 24.0 24.0 25.0   CREATININE mg/dL 0.87 0.89 0.89 1.03 1.03   GLUCOSE mg/dL 137* 126* 138* 132* 127*   MAGNESIUM mg/dL  --   --  2.1 2.2 2.2   PHOSPHORUS mg/dL  --   --  2.7 4.0 4.0     Estimated Creatinine Clearance: 118.3 mL/min (by C-G formula based on SCr of 0.87 mg/dL).  Results from last 7 days   Lab Units 02/04/20  1535   ALK PHOS U/L 83   BILIRUBIN mg/dL 0.7   ALT (SGPT) U/L 19   AST (SGOT) U/L 19       Results from last 7 days   Lab Units 02/05/20  1617 02/05/20  1140 02/05/20  1027 02/05/20  1000 02/05/20  0933   PH, ARTERIAL pH units 7.320* 7.358 7.40 7.43 7.43   PCO2, ARTERIAL mm Hg 53.3 45.6  --   --   --    PO2 ART mm Hg 80.9* 135.0*  --   --   --    FIO2 % 40 100  --   --   --        Images:  Imaging Results (Last 24 Hours)     ** No results found for the last 24 hours. **            Results: Reviewed.  I reviewed the patient's new laboratory and imaging results.  I independently reviewed the patient's new images.    Medications: Reviewed.    Assessment/Plan   A / P     Mr. Quintero is a 53yo M who underwent CABG x 4 by Dr. Pardo on 2/5/20.     Nutrition:   Diet Regular; Cardiac  Advance Directives:   Code Status and Medical Interventions:   Ordered at: 02/05/20 1120     Code Status:    CPR     Medical Interventions (Level of Support Prior to Arrest):    Full       Active Hospital Problems    Diagnosis   • **Coronary artery disease status post coronary artery bypass grafting x4.   • Chronic systolic CHF (congestive heart failure) (CMS/[facility])   • HTN (hypertension)       Assessment / Plan:    7. Off all pressors.    8. Chest tubes removed on 2/7/20. Start SQH.   9. Blood glucose well controlled.   10. Ambulate  11. AM labs  12. Okay to transfer to telemetry when a bed is available.       Plan of care and goals reviewed during interdisciplinary rounds.  I discussed the patient's findings and my recommendations with patient and nursing staff      Agnieszka Hayward DO    Intensive Care Medicine and Pulmonary Medicine       Electronically signed by Agnieszka Hayward DO at 02/08/20 5862

## 2020-02-11 VITALS
SYSTOLIC BLOOD PRESSURE: 111 MMHG | OXYGEN SATURATION: 96 % | TEMPERATURE: 99 F | BODY MASS INDEX: 36.75 KG/M2 | HEIGHT: 68 IN | RESPIRATION RATE: 16 BRPM | HEART RATE: 79 BPM | WEIGHT: 242.51 LBS | DIASTOLIC BLOOD PRESSURE: 73 MMHG

## 2020-02-11 LAB
ANION GAP SERPL CALCULATED.3IONS-SCNC: 10 MMOL/L (ref 5–15)
BUN BLD-MCNC: 13 MG/DL (ref 6–20)
BUN/CREAT SERPL: 15.9 (ref 7–25)
CALCIUM SPEC-SCNC: 9.1 MG/DL (ref 8.6–10.5)
CHLORIDE SERPL-SCNC: 98 MMOL/L (ref 98–107)
CO2 SERPL-SCNC: 28 MMOL/L (ref 22–29)
CREAT BLD-MCNC: 0.82 MG/DL (ref 0.76–1.27)
GFR SERPL CREATININE-BSD FRML MDRD: 120 ML/MIN/1.73
GFR SERPL CREATININE-BSD FRML MDRD: 99 ML/MIN/1.73
GLUCOSE BLD-MCNC: 154 MG/DL (ref 65–99)
POTASSIUM BLD-SCNC: 3.9 MMOL/L (ref 3.5–5.2)
SODIUM BLD-SCNC: 136 MMOL/L (ref 136–145)

## 2020-02-11 PROCEDURE — 99232 SBSQ HOSP IP/OBS MODERATE 35: CPT | Performed by: INTERNAL MEDICINE

## 2020-02-11 PROCEDURE — 80048 BASIC METABOLIC PNL TOTAL CA: CPT | Performed by: NURSE PRACTITIONER

## 2020-02-11 PROCEDURE — 25010000002 FUROSEMIDE PER 20 MG: Performed by: NURSE PRACTITIONER

## 2020-02-11 PROCEDURE — 97530 THERAPEUTIC ACTIVITIES: CPT

## 2020-02-11 RX ORDER — NICOTINE 21 MG/24HR
1 PATCH, TRANSDERMAL 24 HOURS TRANSDERMAL ONCE
Qty: 1 PATCH | Refills: 0 | Status: SHIPPED | OUTPATIENT
Start: 2020-02-11 | End: 2020-02-11

## 2020-02-11 RX ORDER — FUROSEMIDE 10 MG/ML
40 INJECTION INTRAMUSCULAR; INTRAVENOUS ONCE
Status: COMPLETED | OUTPATIENT
Start: 2020-02-11 | End: 2020-02-11

## 2020-02-11 RX ORDER — CARVEDILOL 3.12 MG/1
3.12 TABLET ORAL EVERY 12 HOURS SCHEDULED
Qty: 60 TABLET | Refills: 11 | Status: SHIPPED | OUTPATIENT
Start: 2020-02-11 | End: 2020-02-12

## 2020-02-11 RX ADMIN — SENNOSIDES AND DOCUSATE SODIUM 1 TABLET: 8.6; 5 TABLET ORAL at 08:36

## 2020-02-11 RX ADMIN — CARVEDILOL 3.12 MG: 3.12 TABLET, FILM COATED ORAL at 08:36

## 2020-02-11 RX ADMIN — HYDROCODONE BITARTRATE AND ACETAMINOPHEN 1 TABLET: 7.5; 325 TABLET ORAL at 17:36

## 2020-02-11 RX ADMIN — HYDROCODONE BITARTRATE AND ACETAMINOPHEN 1 TABLET: 7.5; 325 TABLET ORAL at 11:42

## 2020-02-11 RX ADMIN — ASPIRIN 325 MG: 325 TABLET, DELAYED RELEASE ORAL at 08:37

## 2020-02-11 RX ADMIN — FUROSEMIDE 40 MG: 10 INJECTION, SOLUTION INTRAMUSCULAR; INTRAVENOUS at 11:43

## 2020-02-11 NOTE — THERAPY DISCHARGE NOTE
Patient Name: Ross Quintero  : 1965    MRN: [id number]                              Today's Date: 2020       Admit Date: 2020    Visit Dx:     ICD-10-CM ICD-9-CM   1. Atherosclerosis of native coronary artery of native heart, angina presence unspecified I25.10 414.01     Patient Active Problem List   Diagnosis   • Abnormal echocardiogram   • Angina at rest (CMS/[facility])   • Coronary artery disease status post coronary artery bypass grafting x4.   • Chronic systolic CHF (congestive heart failure) (CMS/[facility])   • HTN (hypertension)     Past Medical History:   Diagnosis Date   • Cardiomyopathy (CMS/HCC)    • CHF (congestive heart failure) (CMS/[facility])    • Hyperlipidemia    • Hypertension    • Tobacco abuse      Past Surgical History:   Procedure Laterality Date   • CARDIAC CATHETERIZATION N/A 2020    Procedure: Left Heart Cath;  Surgeon: Darinel Martinez MD;  Location:  [facility];  Service: Cardiovascular   • CARDIAC CATHETERIZATION  2020   • CORONARY ARTERY BYPASS GRAFT N/A 2020    Procedure: MEDIAN STERNOTOMY, CORONARY ARTERY BYPASS X 4 WITH LEFT INTERNAL MAMMARY ARTERY GRAFT, ENDOSCOPIC VEIN HARVEST OF RIGHT GREATER SAPHENOUS VEIN;  Surgeon: Darinel Pardo MD;  Location:  [facility];  Service: Cardiothoracic;  Laterality: N/A;  VEIN OUT: 0832  VEIN READY: 0843     • LASIK     • VASECTOMY      STATES RECEIVED GENERAL ANESTHESIA FOR THIS   • WISDOM TOOTH EXTRACTION       General Information     Row Name 20 1158          PT Evaluation Time/Intention    Document Type  discharge treatment;therapy note (daily note)  -DM     Mode of Treatment  physical therapy  -DM     Row Name 20 1158          General Information    Existing Precautions/Restrictions  cardiac;fall;sternal  -DM       User Key  (r) = Recorded By, (t) = Taken By, (c) = Cosigned By    Initials Name Provider Type    DM Marychuy Bhatia, PT Physical Therapist        Mobility     Row Name 20  "1158          Bed Mobility Assessment/Treatment    Comment (Bed Mobility)  UIC  -DM     Row Name 02/11/20 1158          Transfer Assessment/Treatment    Comment (Transfers)  cues for HP,seq, splinting (issued gt belt & instructed in positioning to secure post op pillow)  -DM     Row Name 02/11/20 1158          Sit-Stand Transfer    Sit-Stand Colony (Transfers)  supervision  -DM     Assistive Device (Sit-Stand Transfers)  other (see comments) gt.belt  -DM     Row Name 02/11/20 1158          Gait/Stairs Assessment/Training    Gait/Stairs Assessment/Training  gait/ambulation independence  -DM     Colony Level (Gait)  stand by assist MIP,then gt.to stairs & 5 min.in mao;1 stand.rest; HR to 124  -DM     Assistive Device (Gait)  other (see comments) gt belt  -DM     Distance in Feet (Gait)  700 ( 5 1/2 min)  -DM     Pattern (Gait)  step-through  -DM     Deviations/Abnormal Patterns (Gait)  barbra decreased;stride length decreased decr step length  -DM     Negotiation (Stairs)  stairs independence  -DM     Colony Level (Stairs)  contact guard  -DM     Assistive Device (Stairs)  other (see comments) gt belt   -DM     Handrail Location (Stairs)  none  -DM     Number of Steps (Stairs)  1  -DM     Ascending Technique (Stairs)  step-to-step  -DM     Descending Technique (Stairs)  step-to-step  -DM     Comment (Gait/Stairs)  cues to incr step length, Focus ahead, PLB; c/o incr R shldr pain, then \"pop\" when spont. trying shldr shrug to alleviate pain, & noted immed. relief  -DM       User Key  (r) = Recorded By, (t) = Taken By, (c) = Cosigned By    Initials Name Provider Type    DM Marychuy Bhatia, PT Physical Therapist        Obj/Interventions     Row Name 02/11/20 1158          Therapeutic Exercise    Upper Extremity Range of Motion (Therapeutic Exercise)  shoulder flexion/extension, bilateral;shoulder abduction/adduction, bilateral;elbow flexion/extension, bilateral very limited R shldr mvmt d/t \"catching\" " pain, & referred pain from L to R w/ L shldr mvmt  -DM     Lower Extremity (Therapeutic Exercise)  marching while seated;marching while standing;LAQ (long arc quad), bilateral  -DM     Lower Extremity Range of Motion (Therapeutic Exercise)  ankle dorsiflexion/plantar flexion, bilateral  -DM     Exercise Type (Therapeutic Exercise)  AROM (active range of motion)  -DM     Position (Therapeutic Exercise)  seated;standing  -DM     Sets/Reps (Therapeutic Exercise)  1/10  -DM     Expected Outcome (Therapeutic Exercise)  improve functional stability;improve functional tolerance, single extremity activity  -DM     Comment (Therapeutic Exercise)  ret. to issue HEP & instruct  -DM     Row Name 02/11/20 1158          Static Sitting Balance    Level of [place] (Unsupported Sitting, Static Balance)  independent  -DM     Sitting Position (Unsupported Sitting, Static Balance)  sitting in chair  -DM     Comment (Unsupported Sitting, Static Balance)  LE exer; PLB; BP readings  -DM     Row Name 02/11/20 1158          Dynamic Sitting Balance    Level of [place], Reaches Outside Midline (Sitting, Dynamic Balance)  independent  -DM     Sitting Position, Reaches Outside Midline (Sitting, Dynamic Balance)  sitting in chair  -DM     Comment, Reaches Outside Midline (Sitting, Dynamic Balance)  recip scooting  -DM     Row Name 02/11/20 1158          Static Standing Balance    Level of [place] (Supported Standing, Static Balance)  independent  -DM     Time Able to Maintain Position (Supported Standing, Static Balance)  1 to 2 minutes  -DM     Assistive Device Utilized (Supported Standing, Static Balance)  other (see comments) gt belt  -DM     Comment (Supported Standing, Static Balance)  trunk ext, PLB; sh.shrugs to alleviate R shldr pain  -DM     Row Name 02/11/20 1158          Dynamic Standing Balance    Level of [place], Reaches Outside Midline (Standing, Dynamic Balance)  supervision  -DM     Time Able to Maintain  Position, Reaches Outside Midline (Standing, Dynamic Balance)  30 to 45 seconds  -DM     Assistive Device Utilized (Supported Standing, Dynamic Balance)  other (see comments) gt belt  -DM     Comment, Reaches Outside Midline (Standing, Dynamic Balance)  MIP  -DM       User Key  (r) = Recorded By, (t) = Taken By, (c) = Cosigned By    Initials Name Provider Type    DM Marychuy Bhatia, PT Physical Therapist        Goals/Plan     Row Name 02/11/20 1158          Bed Mobility Goal 1 (PT)    Activity/Assistive Device (Bed Mobility Goal 1, PT)  sit to supine/supine to sit  -DM     Rooks Level/Cues Needed (Bed Mobility Goal 1, PT)  independent  -DM     Time Frame (Bed Mobility Goal 1, PT)  long term goal (LTG);10 days  -DM     Progress/Outcomes (Bed Mobility Goal 1, PT)  goal met  -DM     Row Name 02/11/20 1158          Transfer Goal 1 (PT)    Activity/Assistive Device (Transfer Goal 1, PT)  sit-to-stand/stand-to-sit  -DM     Rooks Level/Cues Needed (Transfer Goal 1, PT)  independent  -DM     Time Frame (Transfer Goal 1, PT)  long term goal (LTG);10 days  -DM     Progress/Outcome (Transfer Goal 1, PT)  goal met  -DM     Row Name 02/11/20 1158          Gait Training Goal 1 (PT)    Activity/Assistive Device (Gait Training Goal 1, PT)  gait (walking locomotion)  -DM     Rooks Level (Gait Training Goal 1, PT)  independent  -DM     Distance (Gait Goal 1, PT)  400  -DM     Time Frame (Gait Training Goal 1, PT)  long term goal (LTG);10 days  -DM     Progress/Outcome (Gait Training Goal 1, PT)  goal met pt indep w/ wife amb. him, & supv w/ PT amb him 700 ft  -DM     Row Name 02/11/20 1158          Stairs Goal 1 (PT)    Activity/Assistive Device (Stairs Goal 1, PT)  ascending stairs;descending stairs VSS  -DM     Rooks Level/Cues Needed (Stairs Goal 1, PT)  contact guard assist  -DM     Number of Stairs (Stairs Goal 1, PT)  1  -DM     Time Frame (Stairs Goal 1, PT)  short term goal (STG);1 day  -DM      Progress/Outcome (Stairs Goal 1, PT)  goal met  -DM     Row Name 02/11/20 1158          Patient Education Goal (PT)    Activity (Patient Education Goal, PT)  HEP  -DM     Dare/Cues/Accuracy (Memory Goal 2, PT)  verbalizes understanding  -DM     Time Frame (Patient Education Goal, PT)  long term goal (LTG);10 days  -DM     Progress/Outcome (Patient Education Goal, PT)  goal met  -DM       User Key  (r) = Recorded By, (t) = Taken By, (c) = Cosigned By    Initials Name Provider Type    Marychuy Macedo, PT Physical Therapist        Clinical Impression     Row Name 02/11/20 1158          Pain Assessment    Additional Documentation  Pain Scale: Numbers Pre/Post-Treatment (Group)  -DM     Row Name 02/11/20 1158          Pain Scale: Numbers Pre/Post-Treatment    Pain Scale: Numbers, Pretreatment  3/10  -DM     Pain Scale: Numbers, Post-Treatment  5/10  -DM     Pain Location - Orientation  incisional  -DM     Pain Location  chest & R shldr  -DM     Pain Intervention(s)  Repositioned;Elevated;Rest  -DM     Row Name 02/11/20 1158          Vital Signs    Pre Systolic BP Rehab  121  -DM     Pre Treatment Diastolic BP  81  -DM     Post Systolic BP Rehab  125  -DM     Post Treatment Diastolic BP  73  -DM     Pretreatment Heart Rate (beats/min)  89  -DM     Intratreatment Heart Rate (beats/min)  124  -DM     Posttreatment Heart Rate (beats/min)  100  -DM     Pre SpO2 (%)  96  -DM     O2 Delivery Pre Treatment  room air  -DM     O2 Delivery Intra Treatment  room air  -DM     O2 Delivery Post Treatment  room air  -DM     Pre Patient Position  Sitting  -DM     Intra Patient Position  Standing  -DM     Post Patient Position  Sitting respirex 2000 cc  -DM     Rest Breaks   1  -DM     Row Name 02/11/20 1158          Positioning and Restraints    Pre-Treatment Position  sitting in chair/recliner  -DM     Post Treatment Position  chair  -DM     In Chair  notified nsg;reclined;call light within reach;encouraged to call for  assist;legs elevated  -DM       User Key  (r) = Recorded By, (t) = Taken By, (c) = Cosigned By    Initials Name Provider Type    Marychuy Macedo, PT Physical Therapist        Outcome Measures     Row Name 02/11/20 1158          How much help from another person do you currently need...    Turning from your back to your side while in flat bed without using bedrails?  4  -DM     Moving from lying on back to sitting on the side of a flat bed without bedrails?  4  -DM     Moving to and from a bed to a chair (including a wheelchair)?  3  -DM     Standing up from a chair using your arms (e.g., wheelchair, bedside chair)?  4  -DM     Climbing 3-5 steps with a railing?  3  -DM     To walk in hospital room?  4  -DM     AM-PAC 6 Clicks Score (PT)  22  -DM     Row Name 02/11/20 1158          Functional Assessment    Outcome Measure Options  AM-PAC 6 Clicks Basic Mobility (PT)  -DM       User Key  (r) = Recorded By, (t) = Taken By, (c) = Cosigned By    Initials Name Provider Type    Marychuy Macedo, PT Physical Therapist          PT Recommendation and Plan     Outcome Summary/Treatment Plan (PT)  Anticipated Discharge Disposition (PT): home with assist, home with OP services(OP card rehab)  Plan of Care Reviewed With: patient, spouse  Progress: improving  Outcome Summary: Performed STS transf x 2 (supv., then indep), MIP x 10, amb 700 ft w/ SBA & on 1 step w/ CGA (5 1/2 MIN. total), w/ 1 stand.rest, + perform Bal. activ & cardiac ther exer per issued HEP, but limited by incr. incis. & R shldr pain to 5/10, low HGB (9.5), occ PVC's, & elev HR to 124; met goals & will d/c home w/ asst this PM; plans OP card. rehab     Time Calculation:   PT Charges     Row Name 02/11/20 1222             Time Calculation    Start Time  1158  -DM      PT Received On  02/11/20  -DM      PT Goal Re-Cert Due Date  02/17/20  -DM         Time Calculation- PT    Total Timed Code Minutes- PT  15 minute(s)  -DM         Timed Charges    46453 - PT  Therapeutic Activity Minutes  15  -DM        User Key  (r) = Recorded By, (t) = Taken By, (c) = Cosigned By    Initials Name Provider Type    DM Marychuy Bhatia, PT Physical Therapist        Therapy Charges for Today     Code Description Service Date Service Provider Modifiers Qty    [id number]  PT THERAPEUTIC ACT EA 15 MIN 2/11/2020 Marychuy Bhatia, PT GP 1          PT G-Codes  Outcome Measure Options: AM-PAC 6 Clicks Basic Mobility (PT)  AM-PAC 6 Clicks Score (PT): 22    PT Discharge Summary  Anticipated Discharge Disposition (PT): home with assist, home with OP services(OP card rehab)    Marychuy Bhatia, PT  2/11/2020

## 2020-02-11 NOTE — PROGRESS NOTES
Continued Stay Note  [facility]     Patient Name: Ross Quintero  MRN: [id number]  Today's Date: 2/11/2020    Admit Date: 2/5/2020    Discharge Plan     Row Name 02/11/20 0945       Plan    Plan  Home with family    Patient/Family in Agreement with Plan  yes    Plan Comments  Pt. is waiting for insurance approval for life vest. Plan is to dc to [place] with family. Will cont. to follow.     Final Discharge Disposition Code  01 - home or self-care        Discharge Codes    No documentation.       Expected Discharge Date and Time     Expected Discharge Date Expected Discharge Time    Feb 11, 2020             Nini Hanson RN

## 2020-02-11 NOTE — PLAN OF CARE
Problem: Patient Care Overview  Goal: Plan of Care Review  2/11/2020 1218 by Marychuy Bhatia, PT  Flowsheets (Taken 2/11/2020 1218)  Progress: improving  Plan of Care Reviewed With: patient; spouse  Outcome Summary: Performed STS transf x 2 (supv., then indep), MIP x 10, amb 700 ft w/ SBA & on 1 step w/ CGA (5 1/2 MIN. total), w/ 1 stand.rest, + perform Bal. activ & cardiac ther exer per issued HEP, but limited by incr. incis. & R shldr pain to 5/10, low HGB (9.5), occ PVC's, & elev HR to 124; met goals & will d/c home w/ asst this PM; plans OP card. rehab

## 2020-02-11 NOTE — PLAN OF CARE
VSS, patient was restless and agitated at the beginning of shift. Ambien ordered for sleep and patient has slept well most of the night. D/C tomorrow after life vest

## 2020-02-11 NOTE — PAYOR COMM NOTE
"Deyanira Perez RN  Utilization Review  P: [phone]  F: [phone]    Ref # [id number]  Patient did not discharge 2/10 due to waiting on insurance approval for LifeVest  Updated clinicals attached      Maxim Benson (54 y.o. Male)     Date of Birth Social Security Number Address Home Phone MRN    1965  [address]  [facility] [address]  [id number]    Christianity Marital Status          Amish        Admission Date Admission Type Admitting Provider Attending Provider Department, Room/Bed    2/5/20 Elective Darinel Pardo MD Rogers, Anthony G, MD [facility] 4G, S464/1    Discharge Date Discharge Disposition Discharge Destination         Home or Self Care              Attending Provider:  Darinel Pardo MD    Allergies:  No Known Allergies    Isolation:  None   Infection:  None   Code Status:  CPR    Ht:  172.7 cm (67.99\")   Wt:  110 kg (242 lb 8.1 oz)    Admission Cmt:  None   Principal Problem:  Coronary artery disease status post coronary artery bypass grafting x4. [I25.10]                 Active Insurance as of 2/5/2020     Primary Coverage     Payor Plan Insurance Group Employer/Plan Group    [employer] BLUE CROSS [employer] BLUE CROSS BLUE SHIELD PPO [id number]     Payor Plan Address Payor Plan Phone Number Payor Plan Fax Number Effective Dates    [address] [phone]  1/1/2008 - None Entered    [place] [address]       Subscriber Name Subscriber Birth Date Member ID       MAXIM BENSON 1965 [id number]                 Emergency Contacts      (Rel.) Home Phone Work Phone Mobile Phone    gabe benson (Spouse) [phone] -- --               Physician Progress Notes (last 48 hours) (Notes from 02/09/20 1607 through 02/11/20 1607)      Darinel Martinez MD at 02/11/20 0854            [facility]   Inpatient Progress Note       LOS: 6 days   Patient Care Team:  Saul Cowan MD as PCP - General " "(Internal Medicine)  Darinel Martinez MD as Consulting Physician (Cardiology)  Darinel Pardo MD as Surgeon (Cardiothoracic Surgery)    Chief Complaint:  Follow-up for CHF and ICM    Subjective     Interval History:   Patient notes that he had an anxiety attack last night and was medicated for this. No CV complaints. Wants to go home. Still awaiting word regarding life vest.      Review of Systems:   Pertinent positives noted in history, exam, and assessment. Otherwise reviewed and negative.      Objective     Vitals:  Blood pressure 121/81, pulse 95, temperature 98.9 °F (37.2 °C), temperature source Oral, resp. rate 18, height 172.7 cm (67.99\"), weight 110 kg (242 lb 8.1 oz), SpO2 96 %.     Intake/Output Summary (Last 24 hours) at 2/11/2020 0854  Last data filed at 2/11/2020 0000  Gross per 24 hour   Intake --   Output 900 ml   Net -900 ml     Physical Exam   Constitutional: He is oriented to person, place, and time. He appears well-developed and well-nourished.   Neck: No JVD present. No tracheal deviation present.   Cardiovascular: Normal rate, regular rhythm, normal heart sounds and intact distal pulses. Exam reveals no friction rub.   No murmur heard.  Pulmonary/Chest: Effort normal. No respiratory distress. He has no rales.        Abdominal: Soft. Bowel sounds are normal. There is no tenderness.   Musculoskeletal: He exhibits edema (trace to 1+ ble). He exhibits no deformity. Tenderness: geo.   Neurological: He is alert and oriented to person, place, and time.   Skin: Skin is warm and dry.   I have examined the patient and agree with the above    Results Review:     I reviewed the patient's new clinical results.    Results from last 7 days   Lab Units 02/10/20  0425   WBC 10*3/mm3 6.05   HEMOGLOBIN g/dL 9.5*   HEMATOCRIT % 28.1*   PLATELETS 10*3/mm3 148     Results from last 7 days   Lab Units 02/09/20  0350  02/04/20  1535   SODIUM mmol/L 133*   < > 139   POTASSIUM mmol/L 4.1   < > 4.2   CHLORIDE mmol/L " 97*   < > 101   CO2 mmol/L 25.0   < > 25.0   BUN mg/dL 15   < > 12   CREATININE mg/dL 0.89   < > 0.86   CALCIUM mg/dL 8.8   < > 9.3   BILIRUBIN mg/dL  --   --  0.7   ALK PHOS U/L  --   --  83   ALT (SGPT) U/L  --   --  19   AST (SGOT) U/L  --   --  19   GLUCOSE mg/dL 107*   < > 84    < > = values in this interval not displayed.     Results from last 7 days   Lab Units 02/09/20  0350   SODIUM mmol/L 133*   POTASSIUM mmol/L 4.1   CHLORIDE mmol/L 97*   CO2 mmol/L 25.0   BUN mg/dL 15   CREATININE mg/dL 0.89   GLUCOSE mg/dL 107*   CALCIUM mg/dL 8.8     Results from last 7 days   Lab Units 02/06/20  0319 02/05/20  1134 02/04/20  1535   INR  1.38* 1.47* 1.09     No results found for: TROPONINI          Results from last 7 days   Lab Units 02/09/20  0810   PROBNP pg/mL 3,157.0*         Tele:  SR    Assessment/Plan       Coronary artery disease status post coronary artery bypass grafting x4.    Chronic systolic CHF (congestive heart failure) (CMS/HCC)    HTN (hypertension)      1. Ischemic CM/MVCAD  - LVEF 25% pre-op   - CABG x4 2/5/2020  -Limited echo EF 30%     2. SHF  - will need to resume Entresto  - On low dose coreg       3. HLD  -   - Lipitor to 80mg     Plan:  · Repeat lasix 40 mg IV today  · Await information regarding life vest  · Discharge home later today, after insurance decision on LifeVest.  · We will add Entresto back now that his blood pressure has improved  · Follow-up in our office in 2 to 3 weeks time for further med titration    GRACE Pink     I have seen and examined the patient, I have reviewed the note, discussed the case with the advance practice clinician, made necessary changes and I agree with the final note.    Darinel Martinez MD  02/11/20  11:27 AM        Dictated utilizing Dragon dictation          Electronically signed by Darinel Martinez MD at 02/11/20 1129     Darinel Pardo MD at 02/11/20 0712          Ross Quintero  [id number]  1965     LOS: 6 days  "  Patient Care Team:  Saul Cowan MD as PCP - General (Internal Medicine)  Darinel Martinez MD as Consulting Physician (Cardiology)  Darinel Pardo MD as Surgeon (Cardiothoracic Surgery)    Chief Complaint: Coronary artery disease      Subjective: No specific complaints    Objective:     Vital Sign Min/Max for last 24 hours  Temp  Min: 98.8 °F (37.1 °C)  Max: 98.8 °F (37.1 °C)   BP  Min: 118/62  Max: 120/72   Pulse  Min: 80  Max: 93   Resp  Min: 18  Max: 18   SpO2  Min: 90 %  Max: 96 %   No data recorded   Weight  Min: 110 kg (242 lb 3.2 oz)  Max: 110 kg (242 lb 8.1 oz)     Flowsheet Rows      First Filed Value   Admission Height  172.7 cm (67.99\") Documented at 02/06/2020 2321   Admission Weight  107 kg (236 lb 15.9 oz) Documented at 02/06/2020 2321          Physical Exam:    Wound: Satisfactory    Pulses:     Mediastinal and Chest Tube Drainage:       Results Review:   Results from last 7 days   Lab Units 02/10/20  0425   WBC 10*3/mm3 6.05   HEMOGLOBIN g/dL 9.5*   HEMATOCRIT % 28.1*   PLATELETS 10*3/mm3 148     Results from last 7 days   Lab Units 02/09/20  0350   SODIUM mmol/L 133*   POTASSIUM mmol/L 4.1   CHLORIDE mmol/L 97*   CO2 mmol/L 25.0   BUN mg/dL 15   CREATININE mg/dL 0.89   GLUCOSE mg/dL 107*   CALCIUM mg/dL 8.8     Results from last 7 days   Lab Units 02/05/20  1617   PH, ARTERIAL pH units 7.320*   PO2 ART mm Hg 80.9*   PCO2, ARTERIAL mm Hg 53.3   HCO3 ART mmol/L 27.4*         Assessment      Coronary artery disease status post coronary artery bypass grafting x4.    Chronic systolic CHF (congestive heart failure) (CMS/[facility])    HTN (hypertension)      Discharge when all agree        Darinel Pardo MD  02/11/20  7:13 AM      Please note that portions of this note were completed with a voice recognition program. Efforts were made to edit the dictations, but words may be mistranscribed    Electronically signed by Darinel Pardo MD at 02/11/20 0713     Darinel Martinez MD at 02/10/20 0828  " "          [facility]   Inpatient Progress Note       LOS: 5 days   Patient Care Team:  Saul Cowan MD as PCP - General (Internal Medicine)  Darinel Martinez MD as Consulting Physician (Cardiology)  Darinel Pardo MD as Surgeon (Cardiothoracic Surgery)    Chief Complaint:  Follow-up for CHF and ICM    Subjective     Interval History:   Patient had BM this AM. No current CV complaints. Hoping to go home.  Ambulated in hallway 3 laps today without symptoms.      Review of Systems:   Pertinent positives noted in history, exam, and assessment. Otherwise reviewed and negative.      Objective     Vitals:  Blood pressure 110/80, pulse 84, temperature 98.8 °F (37.1 °C), temperature source Oral, resp. rate 18, height 172.7 cm (67.99\"), weight 110 kg (243 lb 9.6 oz), SpO2 95 %.     Intake/Output Summary (Last 24 hours) at 2/10/2020 0828  Last data filed at 2/10/2020 0329  Gross per 24 hour   Intake 240 ml   Output 3050 ml   Net -2810 ml     Physical Exam   Constitutional: He is oriented to person, place, and time. He appears well-developed and well-nourished.   Neck: No JVD present. No tracheal deviation present.   Cardiovascular: Normal rate, regular rhythm, normal heart sounds and intact distal pulses. Exam reveals no friction rub.   No murmur heard.  Pulmonary/Chest: Effort normal. No respiratory distress. He has no rales.        Abdominal: Soft. Bowel sounds are normal. There is no tenderness.   Musculoskeletal: He exhibits edema (trace to 1+ ble). He exhibits no deformity. Tenderness: geo.   Neurological: He is alert and oriented to person, place, and time.   Skin: Skin is warm and dry.        I have examined the patient and agree with the above  Results Review:     I reviewed the patient's new clinical results.    Results from last 7 days   Lab Units 02/10/20  0425   WBC 10*3/mm3 6.05   HEMOGLOBIN g/dL 9.5*   HEMATOCRIT % 28.1*   PLATELETS 10*3/mm3 148     Results from last 7 days "   Lab Units 02/09/20  0350  02/04/20  1535   SODIUM mmol/L 133*   < > 139   POTASSIUM mmol/L 4.1   < > 4.2   CHLORIDE mmol/L 97*   < > 101   CO2 mmol/L 25.0   < > 25.0   BUN mg/dL 15   < > 12   CREATININE mg/dL 0.89   < > 0.86   CALCIUM mg/dL 8.8   < > 9.3   BILIRUBIN mg/dL  --   --  0.7   ALK PHOS U/L  --   --  83   ALT (SGPT) U/L  --   --  19   AST (SGOT) U/L  --   --  19   GLUCOSE mg/dL 107*   < > 84    < > = values in this interval not displayed.     Results from last 7 days   Lab Units 02/09/20  0350   SODIUM mmol/L 133*   POTASSIUM mmol/L 4.1   CHLORIDE mmol/L 97*   CO2 mmol/L 25.0   BUN mg/dL 15   CREATININE mg/dL 0.89   GLUCOSE mg/dL 107*   CALCIUM mg/dL 8.8     Results from last 7 days   Lab Units 02/06/20  0319 02/05/20  1134 02/04/20  1535   INR  1.38* 1.47* 1.09     No results found for: TROPONINI          Results from last 7 days   Lab Units 02/09/20  0810   PROBNP pg/mL 3,157.0*         Tele:  SR    Assessment/Plan       Coronary artery disease status post coronary artery bypass grafting x4.    Chronic systolic CHF (congestive heart failure) (CMS/[facility])    HTN (hypertension)      1. Ischemic CM/MVCAD  - LVEF 25% pre-op   - CABG x4 2/5/2020     2. SHF  - will need to resume Entresto  - currently getting low dose lopressor       3. HLD  -   - Lipitor to 80mg     Plan:  · Lasix 40 mg IV today.  · Echo showed LVEF 30%.  · Recommend LifeVest at hospital discharge.  · Once LifeVest arranged, could consider discharge later this afternoon.    GRACE Pink       I have seen and examined the patient, I have reviewed the note, discussed the case with the advance practice clinician, made necessary changes and I agree with the final note.    Darinel Martinez MD  02/10/20  12:44 PM          Dictated utilizing Dragon dictation          Electronically signed by Darinel Martinez MD at 02/10/20 8724     Darinel Pardo MD at 02/10/20 0654          Ross Quintero  [id number]  1965     LOS: 5  "days   Patient Care Team:  Saul Cowan MD as PCP - General (Internal Medicine)  Darinel Martinez MD as Consulting Physician (Cardiology)  Darinel Pardo MD as Surgeon (Cardiothoracic Surgery)    Chief Complaint: Coronary artery disease      Subjective: No complaints    Objective:     Vital Sign Min/Max for last 24 hours  Temp  Min: 97.8 °F (36.6 °C)  Max: 98.8 °F (37.1 °C)   BP  Min: 71/38  Max: 114/67   Pulse  Min: 75  Max: 97   Resp  Min: 18  Max: 22   SpO2  Min: 90 %  Max: 98 %   No data recorded   Weight  Min: 110 kg (243 lb 6.4 oz)  Max: 110 kg (243 lb 9.6 oz)     Flowsheet Rows      First Filed Value   Admission Height  172.7 cm (67.99\") Documented at 02/06/2020 2321   Admission Weight  107 kg (236 lb 15.9 oz) Documented at 02/06/2020 2321          Physical Exam:    Wound: Satisfactory    Pulses:     Mediastinal and Chest Tube Drainage:       Results Review:   Results from last 7 days   Lab Units 02/10/20  0425   WBC 10*3/mm3 6.05   HEMOGLOBIN g/dL 9.5*   HEMATOCRIT % 28.1*   PLATELETS 10*3/mm3 148     Results from last 7 days   Lab Units 02/09/20  0350   SODIUM mmol/L 133*   POTASSIUM mmol/L 4.1   CHLORIDE mmol/L 97*   CO2 mmol/L 25.0   BUN mg/dL 15   CREATININE mg/dL 0.89   GLUCOSE mg/dL 107*   CALCIUM mg/dL 8.8     Results from last 7 days   Lab Units 02/05/20  1617   PH, ARTERIAL pH units 7.320*   PO2 ART mm Hg 80.9*   PCO2, ARTERIAL mm Hg 53.3   HCO3 ART mmol/L 27.4*         Assessment      Coronary artery disease status post coronary artery bypass grafting x4.    Chronic systolic CHF (congestive heart failure) (CMS/[facility])    HTN (hypertension)      Discharge when all agree        Darinel Pardo MD  02/10/20  6:54 AM      Please note that portions of this note were completed with a voice recognition program. Efforts were made to edit the dictations, but words may be mistranscribed    Electronically signed by Darinel Pardo MD at 02/10/20 0655       "

## 2020-02-11 NOTE — PROGRESS NOTES
"Ross Quintero  [id number]  1965     LOS: 6 days   Patient Care Team:  Salu Cowan MD as PCP - General (Internal Medicine)  Darinel Martinez MD as Consulting Physician (Cardiology)  Darinel Pardo MD as Surgeon (Cardiothoracic Surgery)    Chief Complaint: Coronary artery disease      Subjective: No specific complaints    Objective:     Vital Sign Min/Max for last 24 hours  Temp  Min: 98.8 °F (37.1 °C)  Max: 98.8 °F (37.1 °C)   BP  Min: 118/62  Max: 120/72   Pulse  Min: 80  Max: 93   Resp  Min: 18  Max: 18   SpO2  Min: 90 %  Max: 96 %   No data recorded   Weight  Min: 110 kg (242 lb 3.2 oz)  Max: 110 kg (242 lb 8.1 oz)     Flowsheet Rows      First Filed Value   Admission Height  172.7 cm (67.99\") Documented at 02/06/2020 2321   Admission Weight  107 kg (236 lb 15.9 oz) Documented at 02/06/2020 2321          Physical Exam:    Wound: Satisfactory    Pulses:     Mediastinal and Chest Tube Drainage:       Results Review:   Results from last 7 days   Lab Units 02/10/20  0425   WBC 10*3/mm3 6.05   HEMOGLOBIN g/dL 9.5*   HEMATOCRIT % 28.1*   PLATELETS 10*3/mm3 148     Results from last 7 days   Lab Units 02/09/20  0350   SODIUM mmol/L 133*   POTASSIUM mmol/L 4.1   CHLORIDE mmol/L 97*   CO2 mmol/L 25.0   BUN mg/dL 15   CREATININE mg/dL 0.89   GLUCOSE mg/dL 107*   CALCIUM mg/dL 8.8     Results from last 7 days   Lab Units 02/05/20  1617   PH, ARTERIAL pH units 7.320*   PO2 ART mm Hg 80.9*   PCO2, ARTERIAL mm Hg 53.3   HCO3 ART mmol/L 27.4*         Assessment      Coronary artery disease status post coronary artery bypass grafting x4.    Chronic systolic CHF (congestive heart failure) (CMS/[facility])    HTN (hypertension)      Discharge when all agree        Darinel Pardo MD  02/11/20  7:13 AM      Please note that portions of this note were completed with a voice recognition program. Efforts were made to edit the dictations, but words may be mistranscribed  "

## 2020-02-11 NOTE — PROGRESS NOTES
"  [facility] at [facility]   Inpatient Progress Note       LOS: 6 days   Patient Care Team:  Saul Cowan MD as PCP - General (Internal Medicine)  Darinel Martinez MD as Consulting Physician (Cardiology)  Darinel Pardo MD as Surgeon (Cardiothoracic Surgery)    Chief Complaint:  Follow-up for CHF and ICM    Subjective     Interval History:   Patient notes that he had an anxiety attack last night and was medicated for this. No CV complaints. Wants to go home. Still awaiting word regarding life vest.      Review of Systems:   Pertinent positives noted in history, exam, and assessment. Otherwise reviewed and negative.      Objective     Vitals:  Blood pressure 121/81, pulse 95, temperature 98.9 °F (37.2 °C), temperature source Oral, resp. rate 18, height 172.7 cm (67.99\"), weight 110 kg (242 lb 8.1 oz), SpO2 96 %.     Intake/Output Summary (Last 24 hours) at 2/11/2020 0854  Last data filed at 2/11/2020 0000  Gross per 24 hour   Intake --   Output 900 ml   Net -900 ml     Physical Exam   Constitutional: He is oriented to person, place, and time. He appears well-developed and well-nourished.   Neck: No JVD present. No tracheal deviation present.   Cardiovascular: Normal rate, regular rhythm, normal heart sounds and intact distal pulses. Exam reveals no friction rub.   No murmur heard.  Pulmonary/Chest: Effort normal. No respiratory distress. He has no rales.        Abdominal: Soft. Bowel sounds are normal. There is no tenderness.   Musculoskeletal: He exhibits edema (trace to 1+ ble). He exhibits no deformity. Tenderness: geo.   Neurological: He is alert and oriented to person, place, and time.   Skin: Skin is warm and dry.   I have examined the patient and agree with the above    Results Review:     I reviewed the patient's new clinical results.    Results from last 7 days   Lab Units 02/10/20  0425   WBC 10*3/mm3 6.05   HEMOGLOBIN g/dL 9.5*   HEMATOCRIT % 28.1*   PLATELETS 10*3/mm3 148 "     Results from last 7 days   Lab Units 02/09/20  0350  02/04/20  1535   SODIUM mmol/L 133*   < > 139   POTASSIUM mmol/L 4.1   < > 4.2   CHLORIDE mmol/L 97*   < > 101   CO2 mmol/L 25.0   < > 25.0   BUN mg/dL 15   < > 12   CREATININE mg/dL 0.89   < > 0.86   CALCIUM mg/dL 8.8   < > 9.3   BILIRUBIN mg/dL  --   --  0.7   ALK PHOS U/L  --   --  83   ALT (SGPT) U/L  --   --  19   AST (SGOT) U/L  --   --  19   GLUCOSE mg/dL 107*   < > 84    < > = values in this interval not displayed.     Results from last 7 days   Lab Units 02/09/20  0350   SODIUM mmol/L 133*   POTASSIUM mmol/L 4.1   CHLORIDE mmol/L 97*   CO2 mmol/L 25.0   BUN mg/dL 15   CREATININE mg/dL 0.89   GLUCOSE mg/dL 107*   CALCIUM mg/dL 8.8     Results from last 7 days   Lab Units 02/06/20  0319 02/05/20  1134 02/04/20  1535   INR  1.38* 1.47* 1.09     No results found for: TROPONINI          Results from last 7 days   Lab Units 02/09/20  0810   PROBNP pg/mL 3,157.0*         Tele:  SR    Assessment/Plan       Coronary artery disease status post coronary artery bypass grafting x4.    Chronic systolic CHF (congestive heart failure) (CMS/[facility])    HTN (hypertension)      1. Ischemic CM/MVCAD  - LVEF 25% pre-op   - CABG x4 2/5/2020  -Limited echo EF 30%     2. SHF  - will need to resume Entresto  - On low dose coreg       3. HLD  -   - Lipitor to 80mg     Plan:  · Repeat lasix 40 mg IV today  · Await information regarding life vest  · Discharge home later today, after insurance decision on LifeVest.  · We will add Entresto back now that his blood pressure has improved  · Follow-up in our office in 2 to 3 weeks time for further med titration    GRACE Pink     I have seen and examined the patient, I have reviewed the note, discussed the case with the advance practice clinician, made necessary changes and I agree with the final note.    Darinel Martinez MD  02/11/20  11:27 AM        Dictated utilizing Jayesh jefferson

## 2020-02-12 ENCOUNTER — READMISSION MANAGEMENT (OUTPATIENT)
Dept: CALL CENTER | Facility: HOSPITAL | Age: 55
End: 2020-02-12

## 2020-02-12 RX ORDER — CARVEDILOL 3.12 MG/1
3.12 TABLET ORAL EVERY 12 HOURS SCHEDULED
Qty: 60 TABLET | Refills: 11 | Status: SHIPPED | OUTPATIENT
Start: 2020-02-12 | End: 2020-03-03

## 2020-02-12 NOTE — OUTREACH NOTE
Prep Survey      Responses   Facility patient discharged from?  [place]   Is patient eligible?  Yes   Discharge diagnosis  CABGx4   Does the patient have one of the following disease processes/diagnoses(primary or secondary)?  Cardiothoracic surgery   Does the patient have Home health ordered?  No   Is there a DME ordered?  No   Comments regarding appointments  see AVS   Medication alerts for this patient  lasix, coreg, norco, entresto, K-Dur, nicoderm   Prep survey completed?  Yes          Sarah Liriano RN

## 2020-02-13 ENCOUNTER — READMISSION MANAGEMENT (OUTPATIENT)
Dept: CALL CENTER | Facility: HOSPITAL | Age: 55
End: 2020-02-13

## 2020-02-13 NOTE — OUTREACH NOTE
CT Surgery Week 1 Survey      Responses   Facility patient discharged from?  [place]   Does the patient have one of the following disease processes/diagnoses(primary or secondary)?  Cardiothoracic surgery   Is there a successful TCM telephone encounter documented?  No   Week 1 attempt successful?  No   Unsuccessful attempts  Attempt 1            Luciana Gonzalez RN

## 2020-02-14 ENCOUNTER — READMISSION MANAGEMENT (OUTPATIENT)
Dept: CALL CENTER | Facility: HOSPITAL | Age: 55
End: 2020-02-14

## 2020-02-14 NOTE — OUTREACH NOTE
"CT Surgery Week 1 Survey      Responses   Facility patient discharged from?  [facility]   Does the patient have one of the following disease processes/diagnoses(primary or secondary)?  Cardiothoracic surgery   Is there a successful TCM telephone encounter documented?  No   Week 1 attempt successful?  Yes   Call start time  1428   Call end time  1449   Discharge diagnosis  CABGx4   Is patient permission given to speak with other caregiver?  Yes   List who call center can speak with  wife   Meds reviewed with patient/caregiver?  Yes   Is the patient having any side effects they believe may be caused by any medication additions or changes?  No   Does the patient have all medications related to this admission filled (includes all antibiotics, pain medications, cardiac medications, etc.)  Yes   Is the patient taking all medications as directed (includes completed medication regime)?  Yes   Does the patient have a primary care provider?   Yes   Does the patient have an appointment scheduled with their C/T surgeon?  Yes   Has the patient kept scheduled appointments due by today?  N/A   Psychosocial issues?  No   Comments  Pt c/o under right shoulder blade pain w/movement & cough. It began after intense coughing.He feels like something has\"slipped out of place almost\", he can pinpoint a spot that the pain is located under scapula. he will try heat to try and losen muscles on shoulder to see if pain will resolve. Advised to call MD, but he has f/u appt 2-18, he will wait for. Discussed not taking NSAID w/o checking with MD, can take Tylenol no more than 3000mg/day. His incisions are w/o s/sx of inf. Appetite returned [no issues voiding. Pain well controlled. Using incentive spirometer, up moving/walking. Denies CP/SOA]   Did the patient receive a copy of their discharge instructions?  Yes   Nursing interventions  Reviewed instructions with patient   What is the patient's perception of their health status since discharge?  New " symptoms unrelated to diagnosis   Nursing interventions  Nurse provided patient education, Advised patient to call provider   Nursing interventions  Reassured on normal signs of recovery   Is the patient /caregiver able to teach back basic post-op care?  Continue use of incentive spirometry at least 1 week post discharge, Practice 'cough and deep breath', Take showers only when approved by MD-sponge bathmame until then, Keep incision areas clean, dry and protected, Do not remove steri-strips   Is the patient/caregiver able to teach back signs and symptoms of incisional infection?  Increased drainage or bleeding, Increased redness, swelling or pain at the incisonal site, Pus or odor from incision, Fever   Is the patient/caregiver able to teach back steps to recovery at home?  Set small, achievable goals for return to baseline health, Weigh daily   If the patient is a current smoker, are they able to teach back resources for cessation?  Smoking cessation medications [Pt has refrained from smoking. ]   Is the patient/caregiver able to teach back the hierarchy of who to call/visit for symptoms/problems? PCP, Specialist, Home health nurse, Urgent Care, ED, 911  Yes   Additional teach back comments  Pt is hugging pillow when coughing. He is doing morning wts, states no wt gain. Monitoring bp at home, been less than 120/80   Week 1 call completed?  Yes            Marlene Farmer, CARMELO

## 2020-02-17 ENCOUNTER — APPOINTMENT (OUTPATIENT)
Dept: LAB | Facility: HOSPITAL | Age: 55
End: 2020-02-17

## 2020-02-17 ENCOUNTER — OFFICE VISIT (OUTPATIENT)
Dept: CARDIOLOGY | Facility: HOSPITAL | Age: 55
End: 2020-02-17

## 2020-02-17 VITALS
HEART RATE: 72 BPM | DIASTOLIC BLOOD PRESSURE: 57 MMHG | HEIGHT: 68 IN | SYSTOLIC BLOOD PRESSURE: 105 MMHG | RESPIRATION RATE: 18 BRPM | TEMPERATURE: 97.1 F | WEIGHT: 234 LBS | OXYGEN SATURATION: 99 % | BODY MASS INDEX: 35.46 KG/M2

## 2020-02-17 DIAGNOSIS — I25.10 ATHEROSCLEROSIS OF NATIVE CORONARY ARTERY OF NATIVE HEART WITHOUT ANGINA PECTORIS: ICD-10-CM

## 2020-02-17 DIAGNOSIS — I10 ESSENTIAL HYPERTENSION: ICD-10-CM

## 2020-02-17 DIAGNOSIS — I50.22 CHRONIC SYSTOLIC CHF (CONGESTIVE HEART FAILURE) (HCC): Primary | ICD-10-CM

## 2020-02-17 LAB
ANION GAP SERPL CALCULATED.3IONS-SCNC: 12.5 MMOL/L (ref 5–15)
BUN BLD-MCNC: 12 MG/DL (ref 6–20)
BUN/CREAT SERPL: 14 (ref 7–25)
CALCIUM SPEC-SCNC: 10.6 MG/DL (ref 8.6–10.5)
CHLORIDE SERPL-SCNC: 94 MMOL/L (ref 98–107)
CO2 SERPL-SCNC: 26.5 MMOL/L (ref 22–29)
CREAT BLD-MCNC: 0.86 MG/DL (ref 0.76–1.27)
GFR SERPL CREATININE-BSD FRML MDRD: 113 ML/MIN/1.73
GFR SERPL CREATININE-BSD FRML MDRD: 93 ML/MIN/1.73
GLUCOSE BLD-MCNC: 109 MG/DL (ref 65–99)
POTASSIUM BLD-SCNC: 4.6 MMOL/L (ref 3.5–5.2)
SODIUM BLD-SCNC: 133 MMOL/L (ref 136–145)

## 2020-02-17 PROCEDURE — 80048 BASIC METABOLIC PNL TOTAL CA: CPT | Performed by: NURSE PRACTITIONER

## 2020-02-17 PROCEDURE — 36415 COLL VENOUS BLD VENIPUNCTURE: CPT | Performed by: NURSE PRACTITIONER

## 2020-02-17 NOTE — PROGRESS NOTES
[facility]  [facility]    Encounter Date:02/17/2020     Ross Quintero  [address] [place] [address]    1965    Saul Cowan MD    Ross Quintero is a 54 y.o. male.      Subjective:     Chief Complaint:  Post-op Open Heart Surgery and Establish Care       HPI :  Mr. Quintero comes in to the HF Clinic today for short interval follow up after CABG on 2/5/2020.  LVEF pre-op was 25% and post op was 30%.  He is compliant with wearing his Lifevest and taking his medications.      Overall, he is feeling better each day and anxious to get back to work.  No alarms or issues with the Lifevest.  He brings in a list of weights, BP's, and HR's.  All are in good range and he has dropped about 5 lbs since DC home.    Past Medical History:   Diagnosis Date   • Cardiomyopathy (CMS/HCC)    • CHF (congestive heart failure) (CMS/HCC)    • Hyperlipidemia    • Hypertension    • Tobacco abuse      Past Surgical History:   Procedure Laterality Date   • CARDIAC CATHETERIZATION N/A 1/30/2020    Procedure: Left Heart Cath;  Surgeon: Darinel Martinez MD;  Location:  [facility];  Service: Cardiovascular   • CARDIAC CATHETERIZATION  01/30/2020   • CORONARY ARTERY BYPASS GRAFT N/A 2/5/2020    Procedure: MEDIAN STERNOTOMY, CORONARY ARTERY BYPASS X 4 WITH LEFT INTERNAL MAMMARY ARTERY GRAFT, ENDOSCOPIC VEIN HARVEST OF RIGHT GREATER SAPHENOUS VEIN;  Surgeon: Darinel Pardo MD;  Location: [facility] OR;  Service: Cardiothoracic;  Laterality: N/A;  VEIN OUT: 0832  VEIN READY: 0843     • LASIK     • VASECTOMY      STATES RECEIVED GENERAL ANESTHESIA FOR THIS   • WISDOM TOOTH EXTRACTION         No Known Allergies      Current Outpatient Medications:   •  aspirin 81 MG tablet, Take 1 tablet by mouth Daily., Disp: 30 tablet, Rfl: 11  •  atorvastatin (LIPITOR) 40 MG tablet, Take 1 tablet by mouth Daily., Disp: 30 tablet, Rfl: 11  •  carvedilol (COREG) 3.125 MG tablet, Take 1 tablet by  mouth Every 12 (Twelve) Hours., Disp: 60 tablet, Rfl: 11  •  cetirizine (zyrTEC) 10 MG tablet, Take 10 mg by mouth Daily. Alternates with Allegra., Disp: , Rfl:   •  fexofenadine (ALLEGRA) 180 MG tablet, Take 180 mg by mouth Daily. Pt states he alternates with Zyrtec, Disp: , Rfl:   •  furosemide (LASIX) 40 MG tablet, Take 1 tablet by mouth Daily., Disp: 30 tablet, Rfl: 11  •  HYDROcodone-acetaminophen (NORCO) 7.5-325 MG per tablet, Take 1 tablet by mouth Every 6 (Six) Hours As Needed for Moderate Pain ., Disp: 24 tablet, Rfl: 0  •  potassium chloride (K-DUR,KLOR-CON) 10 MEQ ER tablet, Take 1 tablet by mouth Daily., Disp: 30 tablet, Rfl: 11  •  sacubitril-valsartan (ENTRESTO) 24-26 MG tablet, Take 1 tablet by mouth 2 (Two) Times a Day., Disp: 60 tablet, Rfl: 11  •  varenicline (CHANTIX) 1 MG tablet, Take 1 mg by mouth 2 (Two) Times a Day., Disp: , Rfl:   •  zolpidem (AMBIEN) 10 MG tablet, Take 10 mg by mouth At Night As Needed for Sleep., Disp: , Rfl:     The following portions of the patient's history were reviewed and updated as appropriate in Epic:  Problem list, allergies, current medications, past medical and surgical history, past social and family history.     Review of Systems   Constitution: Positive for malaise/fatigue.   HENT: Negative.    Eyes: Negative.    Cardiovascular: Positive for palpitations.   Respiratory: Negative.    Endocrine: Negative.    Hematologic/Lymphatic: Negative.    Skin: Negative.    Musculoskeletal: Negative.    Gastrointestinal: Positive for nausea.   Genitourinary: Negative.    Neurological: Negative.    Psychiatric/Behavioral: Negative.         Difficulty sleeping but not insomnia   Allergic/Immunologic: Negative.        Objective:     Vitals:    02/17/20 1022 02/17/20 1028 02/17/20 1029   BP: 113/63 105/65 105/57   BP Location: Right arm Left arm Left arm   Patient Position: Sitting Standing Sitting   Cuff Size: Adult Adult Adult   Pulse: 70 81 72   Resp:   18   Temp:   97.1 °F  "(36.2 °C)   TempSrc:   Temporal   SpO2: 99% 99% 99%   Weight:   106 kg (234 lb)   Height:   172.7 cm (67.99\")         Physical Exam   Constitutional: He is oriented to person, place, and time. He appears well-developed. No distress.   HENT:   Head: Normocephalic and atraumatic.   Eyes: Pupils are equal, round, and reactive to light. Conjunctivae are normal. No scleral icterus.   Neck: Normal range of motion. Neck supple. No JVD present.   Pulmonary/Chest: Effort normal and breath sounds normal. No respiratory distress. He has no wheezes. He has no rales.   Musculoskeletal: Normal range of motion. He exhibits no edema.   Neurological: He is alert and oriented to person, place, and time.   Skin: Skin is warm and dry.   Mid-sternal incision, MT sites, and EVH sties all healing well without redness, exudate, or tenderness   Psychiatric: He has a normal mood and affect. His behavior is normal. Judgment and thought content normal.   Vitals reviewed.      Lab and Diagnostic Review:  Basic Metabolic Panel   Specimen Information: Blood        Component  Ref Range & Units 1d ago   Glucose  65 - 99 mg/dL 109High     BUN  6 - 20 mg/dL 12    Creatinine  0.76 - 1.27 mg/dL 0.86    Sodium  136 - 145 mmol/L 133Low     Potassium  3.5 - 5.2 mmol/L 4.6    Chloride  98 - 107 mmol/L 94Low     CO2  22.0 - 29.0 mmol/L 26.5    Calcium  8.6 - 10.5 mg/dL 10.6High     eGFR  African Amer  >60 mL/min/1.73 113    eGFR Non African Amer  >60 mL/min/1.73 93              Assessment and Plan:     1. Acute/ chronic systolic CHF (congestive heart failure) (CMS/[facility])  - Compliant with Lifevest  - Patient has HF booklet and we reviewed basic educational info.  He/ spouse were familiar with basic pathophysiology, meds, and anticipated re-eval of echo in approximatley 3 mos with potential implantable ICD  - Basic Metabolic Panel today since he is one week out on Entresto  - Continue ambulation, meds (coreg, entresto, lasix, Kdur), and low sodium diet  - BP " appears too low for medication titration today.  See cardiology on 3/3/2020    2. CAD s/p CABG x 4  - ASA, statin, BB  - Cardiac rehab referral in [place] confirmed  - See CT Surgery as scheduled 3/16/2020

## 2020-02-18 ENCOUNTER — TELEPHONE (OUTPATIENT)
Dept: CARDIOLOGY | Facility: HOSPITAL | Age: 55
End: 2020-02-18

## 2020-02-18 DIAGNOSIS — I50.22 CHRONIC SYSTOLIC CHF (CONGESTIVE HEART FAILURE) (HCC): Primary | ICD-10-CM

## 2020-02-18 NOTE — TELEPHONE ENCOUNTER
Called patient re: labs.  Advised sodium/ chloride mildly low.  Would recommend reduce lasix/ kdur to QOD until seen by Cardiology.  Continue daily weight and BP and report weight gain to HF clinic in the interim.      I also let him know I spoke with Cardiac Rehab in [place].  Their staff says they attempted to contact him but I gave them an alternate phone number and they will call again.      Mr. Quintero verbalized understanding.    Tanya EDWARDS

## 2020-02-21 ENCOUNTER — READMISSION MANAGEMENT (OUTPATIENT)
Dept: CALL CENTER | Facility: HOSPITAL | Age: 55
End: 2020-02-21

## 2020-02-21 NOTE — OUTREACH NOTE
CT Surgery Week 2 Survey      Responses   Facility patient discharged from?  [facility]   Does the patient have one of the following disease processes/diagnoses(primary or secondary)?  Cardiothoracic surgery   Week 2 attempt successful?  Yes   Call start time  1714   Call end time  1718   Discharge diagnosis  CABGx4   Meds reviewed with patient/caregiver?  Yes   Is the patient taking all medications as directed (includes completed medication regime)?  Yes   Does the patient have an appointment scheduled with their C/T surgeon?  Yes [3/3/20]   Comments regarding PCP  Appt with PCP 2/18/20   Has the patient kept scheduled appointments due by today?  Yes   What is the patient's perception of their health status since discharge?  Improving   Nursing interventions  Nurse provided patient education   Is the patient/caregiver able to teach back normal signs of recovery?  Constipation, Nausea and lack of appetite, Depression or irritability   Nursing interventions  Reassured on normal signs of recovery   Is the patient /caregiver able to teach back basic post-op care?  Lifting as instructed by MD in discharge instructions, Drive as instructed by MD in discharge instructions, No tub bath, swimming, or hot tub until instructed by MD, Keep incision areas clean, dry and protected   Is the patient/caregiver able to teach back signs and symptoms of incisional infection?  Pus or odor from incision, Fever   Is the patient/caregiver able to teach back steps to recovery at home?  Set small, achievable goals for return to baseline health, Rest and rebuild strength, gradually increase activity   Is the patient /caregiver able to teach back the importance of cardiac rehab?  Yes [Pt will start 3/3/20]   If the patient is a current smoker, are they able to teach back resources for cessation?  Smoking cessation medications   Week 2 call completed?  Yes          Anna Galvan RN

## 2020-02-27 ENCOUNTER — TELEPHONE (OUTPATIENT)
Dept: CARDIOLOGY | Facility: HOSPITAL | Age: 55
End: 2020-02-27

## 2020-02-27 NOTE — TELEPHONE ENCOUNTER
Patient's wife called with concerns that patient's blood pressure has been running low. She does not know if it is related to his CABG that he had or not. Patient's wife states that patient's blood pressure has been ranging  systolic and 50-60 range for diastolic. Patient has been experiencing a lot of fatigue and his wife states that his color does not appear to be the same.

## 2020-02-27 NOTE — DISCHARGE SUMMARY
CTS Discharge Summary    Patient Care Team:  Saul Cowan MD as PCP - General (Internal Medicine)  Darinel Martinez MD as Consulting Physician (Cardiology)  Darinel Pardo MD as Surgeon (Cardiothoracic Surgery)  Consults:   Consults     Date and Time Order Name Status Description    1/30/2020 1501 Inpatient Cardiothoracic Surgery Consult Completed           Date of Admission: 2/5/2020  5:30 AM  Date of Discharge:  2/11/2020    Discharge Diagnosis  Past Medical History:   Diagnosis Date   • Cardiomyopathy (CMS/HCC)    • CHF (congestive heart failure) (CMS/[facility])    • Hyperlipidemia    • Hypertension    • Tobacco abuse      Patient Active Problem List   Diagnosis   • Abnormal echocardiogram   • Angina at rest (CMS/[facility])   • Coronary artery disease status post coronary artery bypass grafting x4.   • Chronic systolic CHF (congestive heart failure) (CMS/HCC)   • HTN (hypertension)       Angina at rest (CMS/[facility]) [I20.8]  Atherosclerosis of native coronary artery of native heart, angina presence unspecified [I25.10]     Procedures Performed  Procedure(s):  MEDIAN STERNOTOMY, CORONARY ARTERY BYPASS X 4 WITH LEFT INTERNAL MAMMARY ARTERY GRAFT, ENDOSCOPIC VEIN HARVEST OF RIGHT GREATER SAPHENOUS VEIN       History of Present Illness  Patient is a 54 y.o. male who over the last several weeks has had dyspnea on exertion.  He also woke up at night very short of breath.  He states this is not his usual self.  He has a patient of Dr. Saul hsieh was seen and evaluated.  He is also saw Dr. Darinel Martinez in consultation.  He is undergone echocardiogram and been found to have an ejection fraction from 15 to 25%.  He is undergone cardiac catheterization today and is been found to have evidence of severe three-vessel coronary artery disease.  I have been asked see the patient regards possible coronary bypass surgery.      Hospital Course  Patient was taken to the operating room on 2/5/20 for CABG x4 with EV.  Patient was  transported to cardiac ICU intubated and in stable condition. Extubated per ICU protocol.  POD 1:  On 3L NC. Weaning Primacor and Levophed.  POD 2:  [place] and chest tubes removed. Ambulated with PT.   POD 3: Pressors off.  Awaiting telemetry  POD 4:  Transferred to telemetry  POD 5:  Diuresed.  LifeVest being arranged for LVEF<30%  POD 6:  Diuresed.  Patient met discharge criteria and was discharged to home      Discharge Medications     Discharge Medications      New Medications      Instructions Start Date   carvedilol 3.125 MG tablet  Commonly known as:  COREG   3.125 mg, Oral, Every 12 Hours Scheduled      furosemide 40 MG tablet  Commonly known as:  LASIX   40 mg, Oral, Daily      HYDROcodone-acetaminophen 7.5-325 MG per tablet  Commonly known as:  NORCO   1 tablet, Oral, Every 6 Hours PRN      potassium chloride 10 MEQ ER tablet  Commonly known as:  K-DUR,KLOR-CON   10 mEq, Oral, Daily         Changes to Medications      Instructions Start Date   sacubitril-valsartan 24-26 MG tablet  Commonly known as:  ENTRESTO  What changed:  when to take this   1 tablet, Oral, 2 Times Daily         Continue These Medications      Instructions Start Date   aspirin 81 MG tablet   81 mg, Oral, Daily      atorvastatin 40 MG tablet  Commonly known as:  LIPITOR   40 mg, Oral, Daily      cetirizine 10 MG tablet  Commonly known as:  zyrTEC   10 mg, Oral, Daily, Alternates with Allegra.       fexofenadine 180 MG tablet  Commonly known as:  ALLEGRA   180 mg, Oral, Daily, Pt states he alternates with Zyrtec      varenicline 1 MG tablet  Commonly known as:  CHANTIX   1 mg, Oral, 2 Times Daily      zolpidem 10 MG tablet  Commonly known as:  AMBIEN   10 mg, Oral, Nightly PRN         Stop These Medications    metoprolol succinate XL 50 MG 24 hr tablet  Commonly known as:  TOPROL-XL        ASK your doctor about these medications      Instructions Start Date   nicotine 21 MG/24HR patch  Commonly known as:  NICODERM CQ  Ask about: Should I  take this medication?   1 patch, Transdermal, Once             Discharge Diet:   Diet Instructions     Diet: Consistent Carbohydrate, Cardiac      Discharge Diet:   Consistent Carbohydrate  Cardiac       Diet: Consistent Carbohydrate, Cardiac      Discharge Diet:   Consistent Carbohydrate  Cardiac             Activity at Discharge:   Activity Instructions     Bathing Restrictions      Type of Restriction:  Bathing    Bathing Restrictions:  No Tub Bath    Bathing Restrictions      Type of Restriction:  Bathing    Bathing Restrictions:  No Tub Bath    Driving Restrictions      Type of Restriction:  Driving    Driving Restrictions:  No Driving Until Next Appointment    Driving Restrictions      Type of Restriction:  Driving    Driving Restrictions:  No Driving Until Next Appointment    Lifting Restrictions      Type of Restriction:  Lifting    Lifting Restrictions:  Lifting Restriction (Indicate Limit)    Weight Limit (Pounds):  10    Length of Lifting Restriction:  until next appointment    Lifting Restrictions      Type of Restriction:  Lifting    Lifting Restrictions:  Lifting Restriction (Indicate Limit)    Weight Limit (Pounds):  10    Length of Lifting Restriction:  until next appointment          Follow-up Appointments  Future Appointments   Date Time Provider Department Center   3/3/2020  2:45 PM Darinel Martinez MD [facility] None   3/16/2020 12:30 PM Reyna Dorsey APRN [facility] None           Janis Barajas PA-C  02/27/20  3:13 PM

## 2020-02-28 ENCOUNTER — READMISSION MANAGEMENT (OUTPATIENT)
Dept: CALL CENTER | Facility: HOSPITAL | Age: 55
End: 2020-02-28

## 2020-02-28 ENCOUNTER — TELEPHONE (OUTPATIENT)
Dept: CARDIOLOGY | Facility: HOSPITAL | Age: 55
End: 2020-02-28

## 2020-02-28 NOTE — OUTREACH NOTE
CT Surgery Week 3 Survey      Responses   Facility patient discharged from?  [place]   Does the patient have one of the following disease processes/diagnoses(primary or secondary)?  Cardiothoracic surgery   Week 3 attempt successful?  No   Unsuccessful attempts  Attempt 1          Kimberli Lofton RN

## 2020-02-28 NOTE — TELEPHONE ENCOUNTER
Patient returned your phone call    Returned call to patient this AM.  He states over the past several days her has noticed feeling low energy/ fatigue with SBP high 80's to 90's.  He feels better when SBP is >100.  Advised go ahead and hold lasix/ Kdur completely (he had already reduced it to QOD).  Discuss further with Dr. Martinez in clinic tomorrow.      Tanya EDWARDS

## 2020-03-02 ENCOUNTER — READMISSION MANAGEMENT (OUTPATIENT)
Dept: CALL CENTER | Facility: HOSPITAL | Age: 55
End: 2020-03-02

## 2020-03-02 NOTE — PROGRESS NOTES
Subjective:     Encounter Date:03/03/2020    Primary Care Physician: Saul Cowan MD      Patient ID: Ross Quintero is a 54 y.o. male.    Chief Complaint:Follow-up    PROBLEM LIST:  1. Coronary artery disease  a. 1/30/2020 [facility] 80% ostial LAD, involving origin of a large ramus, large bifurcating diagonal.  Abnormal IFR in each vessel.  70% stenosis mid LAD.  Upper division first diagonal 70%.  90% distal RCA.  b. 2/5/2020 CABG x 4 Dr. Pardo vein graft to diagonal into ramus circumflex, vein graft to PDA, LIMA to LAD.  2. Cardiomyopathy/systolic congestive heart failure  a. 1/15/2020 echocardiogram EF 15%.  Mild to moderate mitral regurgitation.  RVSP 45 mmHg.  Biatrial enlargement.  Dilated right ventricle with decreased right ventricular systolic function.  b. 2/10/2020 postop echo EF 30%.  3. Hypertension  4. Dyslipidemia  5. ETOH abuse  6. Tobacco abuse  7. Surgeries:  a. Lasik surgery  b. Vasectomy       No Known Allergies      Current Outpatient Medications:   •  aspirin 81 MG tablet, Take 1 tablet by mouth Daily., Disp: 30 tablet, Rfl: 11  •  atorvastatin (LIPITOR) 40 MG tablet, Take 1 tablet by mouth Daily., Disp: 30 tablet, Rfl: 11  •  carvedilol (COREG) 3.125 MG tablet, Take 1 tablet by mouth Every 12 (Twelve) Hours., Disp: 60 tablet, Rfl: 11  •  sacubitril-valsartan (ENTRESTO) 24-26 MG tablet, Take 1 tablet by mouth 2 (Two) Times a Day., Disp: 60 tablet, Rfl: 11  •  varenicline (CHANTIX) 1 MG tablet, Take 1 mg by mouth 2 (Two) Times a Day., Disp: , Rfl:   •  zolpidem (AMBIEN) 10 MG tablet, Take 10 mg by mouth At Night As Needed for Sleep., Disp: , Rfl:         History of Present Illness    Patient returns today for one-month follow-up status post CABG for ischemic cardiomyopathy.  Since being discharged in the hospital, he was initially doing well but noted some progressive malaise fatigue, was found to be dehydrated, decreased his diuretic potassium dosage, and subsequently discontinued it 1  "week ago.  Over the last 4 days, he notes feeling significantly better has his blood pressures increase from systolics of 92/100.  He has not exercise rehab scheduled to start tomorrow.  He is improving significantly.  Usual musculoskeletal complaints.  No dizziness orthopnea PND or anginal type chest pain.  Still wearing LifeVest.  Notes significant acid reflux, especially nocturnally since his CABG.    The following portions of the patient's history were reviewed and updated as appropriate: allergies, current medications, past family history, past medical history, past social history, past surgical history and problem list.      Social History     Tobacco Use   • Smoking status: Former Smoker     Packs/day: 1.00     Years: 30.00     Pack years: 30.00     Last attempt to quit: 2020     Years since quittin.0   • Smokeless tobacco: Former User     Quit date:    Substance Use Topics   • Alcohol use: Not Currently     Comment: quit 2 months ago   • Drug use: Defer         Review of Systems   Constitution: Positive for decreased appetite and weight loss.   Cardiovascular: Positive for irregular heartbeat.   Respiratory: Negative.    Hematologic/Lymphatic: Negative for bleeding problem. Does not bruise/bleed easily.   Skin: Negative for rash.   Musculoskeletal: Negative for muscle weakness and myalgias.   Gastrointestinal: Positive for change in bowel habit and heartburn. Negative for nausea and vomiting.   Neurological: Negative.           Objective:   /70 (BP Location: Right arm, Patient Position: Sitting)   Pulse 79   Ht 172.7 cm (68\")   Wt 101 kg (223 lb)   SpO2 98%   BMI 33.91 kg/m²         Physical Exam   Constitutional: He is oriented to person, place, and time. He appears well-developed and well-nourished.   HENT:   Mouth/Throat: Oropharynx is clear and moist.   Neck: No JVD present. Carotid bruit is not present. No thyromegaly present.   Cardiovascular: Regular rhythm, S1 normal, S2 " normal, normal heart sounds and intact distal pulses. Exam reveals no gallop, no S3 and no S4.   No murmur heard.  Pulses:       Carotid pulses are 2+ on the right side, and 2+ on the left side.       Radial pulses are 2+ on the right side, and 2+ on the left side.   Pulmonary/Chest: Breath sounds normal.   Well-healed sternotomy scar.   Abdominal: Soft. Bowel sounds are normal. He exhibits no mass. There is no tenderness.   Musculoskeletal: He exhibits no edema.   Neurological: He is alert and oriented to person, place, and time.   Skin: Skin is warm and dry. No rash noted.       Procedures          Assessment:   Assessment/Plan      Ross was seen today for follow-up.    Diagnoses and all orders for this visit:    Atherosclerosis of native coronary artery of native heart without angina pectoris    Ischemic cardiomyopathy    Essential hypertension    Pure hypercholesterolemia      1.  Coronary artery disease 4 weeks status post multivessel CABG no angina  2.  Ischemic cardiomyopathy LVEF 30% immediately postop.  On Entresto and low-dose beta-blocker  3.  Recent hypotension due to dehydration/overdiuresis  4.  History of hypertension, controlled  5.  Dyslipidemia tolerating high-dose statin  6.  Tobacco use, cessation at time of CABG.    Recommendations  1.  Continue LifeVest until LVEF returns normal or ICD is implanted.  2.  Continue to encourage smoking cessation  3.  Encourage cardiac rehab/exercise  4.  We will increase carvedilol to 6.25 mg starting in next week if he tolerates cardiac rehab.  5.  Revisit 1 month time for further med titration today echo    Darinel Martinez MD                 Dictated utilizing Dragon dictation

## 2020-03-02 NOTE — OUTREACH NOTE
CT Surgery Week 3 Survey      Responses   Facility patient discharged from?  [facility]   Does the patient have one of the following disease processes/diagnoses(primary or secondary)?  Cardiothoracic surgery   Week 3 attempt successful?  Yes   Call start time  1721   Call end time  1724   Meds reviewed with patient/caregiver?  Yes   Is the patient having any side effects they believe may be caused by any medication additions or changes?  No   Does the patient have all medications related to this admission filled (includes all antibiotics, pain medications, cardiac medications, etc.)  Yes   Is the patient taking all medications as directed (includes completed medication regime)?  Yes   Medication comments  BP has been low.  He called PCP office and the instructed him to discontinue the fluid pill   Does the patient have a primary care provider?   Yes   Does the patient have an appointment scheduled with their C/T surgeon?  Yes   Has the patient kept scheduled appointments due by today?  Yes   Comments  Pt will see Dr. Martinez tomorrow   Psychosocial issues?  No   Did the patient receive a copy of their discharge instructions?  Yes   Nursing interventions  Reviewed instructions with patient   What is the patient's perception of their health status since discharge?  Improving   Is the patient/caregiver able to teach back the hierarchy of who to call/visit for symptoms/problems? PCP, Specialist, Home health nurse, Urgent Care, ED, 911  Yes   Additional teach back comments  Pt reports he has been fatigued the past few days.     Week 3 call completed?  Yes   Wrap up additional comments  Pt reports he is doing well other than feeling tired occasionally          Kimberli Lofton RN

## 2020-03-03 ENCOUNTER — OFFICE VISIT (OUTPATIENT)
Dept: CARDIOLOGY | Facility: CLINIC | Age: 55
End: 2020-03-03

## 2020-03-03 VITALS
BODY MASS INDEX: 33.8 KG/M2 | WEIGHT: 223 LBS | DIASTOLIC BLOOD PRESSURE: 70 MMHG | OXYGEN SATURATION: 98 % | SYSTOLIC BLOOD PRESSURE: 108 MMHG | HEIGHT: 68 IN | HEART RATE: 79 BPM

## 2020-03-03 DIAGNOSIS — E78.00 PURE HYPERCHOLESTEROLEMIA: ICD-10-CM

## 2020-03-03 DIAGNOSIS — I10 ESSENTIAL HYPERTENSION: ICD-10-CM

## 2020-03-03 DIAGNOSIS — I25.5 ISCHEMIC CARDIOMYOPATHY: ICD-10-CM

## 2020-03-03 DIAGNOSIS — I25.10 ATHEROSCLEROSIS OF NATIVE CORONARY ARTERY OF NATIVE HEART WITHOUT ANGINA PECTORIS: Primary | ICD-10-CM

## 2020-03-03 PROCEDURE — 99214 OFFICE O/P EST MOD 30 MIN: CPT | Performed by: INTERNAL MEDICINE

## 2020-03-03 RX ORDER — CARVEDILOL 6.25 MG/1
6.25 TABLET ORAL EVERY 12 HOURS SCHEDULED
Qty: 60 TABLET | Refills: 5 | Status: SHIPPED | OUTPATIENT
Start: 2020-03-03 | End: 2020-04-07 | Stop reason: DRUGHIGH

## 2020-03-13 ENCOUNTER — READMISSION MANAGEMENT (OUTPATIENT)
Dept: CALL CENTER | Facility: HOSPITAL | Age: 55
End: 2020-03-13

## 2020-03-13 NOTE — OUTREACH NOTE
CT Surgery Week 3 Survey      Responses   [facility] [place] [facility] by [facility] patient discharged from?  [place]   Does the patient have one of the following disease processes/diagnoses(primary or secondary)?  Cardiothoracic surgery          Naomi Hernandez RN

## 2020-03-16 ENCOUNTER — OFFICE VISIT (OUTPATIENT)
Dept: CARDIAC SURGERY | Facility: CLINIC | Age: 55
End: 2020-03-16

## 2020-03-16 VITALS
HEART RATE: 88 BPM | SYSTOLIC BLOOD PRESSURE: 90 MMHG | WEIGHT: 229.2 LBS | TEMPERATURE: 98.8 F | DIASTOLIC BLOOD PRESSURE: 65 MMHG | OXYGEN SATURATION: 97 % | HEIGHT: 68 IN | BODY MASS INDEX: 34.74 KG/M2

## 2020-03-16 DIAGNOSIS — Z95.1 S/P CABG (CORONARY ARTERY BYPASS GRAFT): ICD-10-CM

## 2020-03-16 PROCEDURE — 99024 POSTOP FOLLOW-UP VISIT: CPT | Performed by: NURSE PRACTITIONER

## 2020-03-16 PROCEDURE — 71046 X-RAY EXAM CHEST 2 VIEWS: CPT | Performed by: NURSE PRACTITIONER

## 2020-03-16 NOTE — PROGRESS NOTES
[facility] Cardiothoracic Surgery Follow-Up Note    Name:  Ross Quintero  MRN Number:  [id number]  Date of Encounter:  03/16/2020    Referred By:  No ref. provider found  PCP:  Saul Cowan MD    Chief Complaint:    Chief Complaint   Patient presents with   • Post-op     Hospital follow up s/p CABG x 4 on 2/5/2020       History of Present Illness:    Mr. Ross Quintero is a pleasant 54 y.o. male with a history of hypertension, hyperlipidemia, congestive heart failure, ischemic cardiomyopathy, remote tobacco abuse and coronary artery disease status post CABG x4 with H 2/5/2020 per Dr. Pardo who returns the office today for postoperative exam.  The patient denies incisional pain, shortness of breath or difficulty with ambulation.  The patient continues to be followed by his cardiologist, Dr. Martinez, and returns to see him on 4/7/2020.  He plans to participate in cardiac rehab in [place] when he is able.  He continues to wear a LifeVest as his ejection fraction was estimated to be approximately 30%.  The patient will have a repeat echocardiogram within the next 2 to 3 months.  The patient reports that he has experienced some episodes of hypotension since his beta-blocker was increased.  He denies syncopal episodes or dizziness with position changes.    Review of Systems:  Review of Systems   Constitution: Negative for chills, fever and malaise/fatigue.   Eyes: Negative for visual disturbance.   Cardiovascular: Negative for chest pain, dyspnea on exertion and leg swelling.   Respiratory: Negative for hemoptysis and shortness of breath.    Skin: Negative for poor wound healing.   Gastrointestinal: Negative for abdominal pain.   Neurological: Negative for weakness.   Psychiatric/Behavioral: Negative for altered mental status.       Past Medical History:    Past Medical History:   Diagnosis Date   • Cardiomyopathy (CMS/HCC)    • CHF (congestive heart failure) (CMS/HCC)    • Hyperlipidemia     • Hypertension    • Tobacco abuse        Past Surgical History:    Past Surgical History:   Procedure Laterality Date   • CARDIAC CATHETERIZATION N/A 2020    Procedure: Left Heart Cath;  Surgeon: Darinel Martinez MD;  Location:  [facility];  Service: Cardiovascular   • CARDIAC CATHETERIZATION  2020   • CORONARY ARTERY BYPASS GRAFT N/A 2020    Procedure: MEDIAN STERNOTOMY, CORONARY ARTERY BYPASS X 4 WITH LEFT INTERNAL MAMMARY ARTERY GRAFT, ENDOSCOPIC VEIN HARVEST OF RIGHT GREATER SAPHENOUS VEIN;  Surgeon: Darinel Pardo MD;  [facility];  Service: Cardiothoracic;  Laterality: N/A;  VEIN OUT: 0832  VEIN READY: 0843     • LASIK     • VASECTOMY      STATES RECEIVED GENERAL ANESTHESIA FOR THIS   • WISDOM TOOTH EXTRACTION         Patient Active Problem List   Diagnosis   • Abnormal echocardiogram   • Angina at rest (CMS/[facility])   • Coronary artery disease status post coronary artery bypass grafting x4.   • Chronic systolic CHF (congestive heart failure) (CMS/[facility])   • HTN (hypertension)   • Ischemic cardiomyopathy     Social History     Tobacco Use   • Smoking status: Former Smoker     Packs/day: 1.00     Years: 30.00     Pack years: 30.00     Last attempt to quit: 2020     Years since quittin.1   • Smokeless tobacco: Former User     Quit date:    Substance Use Topics   • Alcohol use: Not Currently     Comment: quit 2 months ago   • Drug use: Defer     Family History   Adopted: Yes       Medications:      Current Outpatient Medications:   •  aspirin 81 MG tablet, Take 1 tablet by mouth Daily., Disp: 30 tablet, Rfl: 11  •  atorvastatin (LIPITOR) 40 MG tablet, Take 1 tablet by mouth Daily., Disp: 30 tablet, Rfl: 11  •  carvedilol (COREG) 6.25 MG tablet, Take 1 tablet by mouth Every 12 (Twelve) Hours., Disp: 60 tablet, Rfl: 5  •  sacubitril-valsartan (ENTRESTO) 24-26 MG tablet, Take 1 tablet by mouth 2 (Two) Times a Day., Disp: 60 tablet, Rfl: 11  •  varenicline  "(CHANTIX) 1 MG tablet, Take 1 mg by mouth 2 (Two) Times a Day., Disp: , Rfl:   •  zolpidem (AMBIEN) 10 MG tablet, Take 10 mg by mouth At Night As Needed for Sleep., Disp: , Rfl:     Allergies:  No Known Allergies    Physical Exam:  Vital Signs:    Vitals:    03/16/20 1224   BP: 90/65   Pulse: 88   Temp: 98.8 °F (37.1 °C)   TempSrc: Temporal   SpO2: 97%   Weight: 104 kg (229 lb 3.2 oz)   Height: 172.7 cm (68\")       Physical Exam   Gen- NAD, pleasant, cooperative  CV- Regular rate and rhythm, no murmur, gallop or rub  Pulm- Clear to auscultation bilateral without wheeze or rhonchi  GI- Soft, normoactive bowel sounds, non-tender  Ext- Without edema,   Incision- Well approximated midsternal, MT and EVH sites with no erythema, drainage or dehiscence noted.  Neuro- CN II- XII grossly intact, tongue midline, voice normal.    Labs/Imaging:  Chest x-ray, [facility], 3/16/2020  Impression:  Interval removal of support hardware status post median sternotomy and CABG with cardiac silhouette in an improved or normalized appearance of pulmonary vascularity. Improved lung volumes and aeration without acute cardiopulmonary process.  I personally reviewed these images in the office today.    Assessment / Plan:  Mr. Ross Quintero is a pleasant 54 y.o. male with a history of hypertension, hyperlipidemia, congestive heart failure, ischemic cardiomyopathy, remote tobacco abuse and coronary artery disease status post CABG x4 with EVH 2/5/2020 per Dr. Pardo who returns the office today for postoperative exam.  The patient is having stable postoperative course.  His incisions are healing well, his sternum is stable and his chest x-ray obtained in the office today is satisfactory.  He is scheduled to follow-up with his cardiologist, Dr. Martinez, 4/7/2020 and is currently enrolled to participate in cardiac rehab at [facility].  The patient was instructed that he may begin driving at 6 weeks postop as long as he is not " experiencing any dizziness or using pain medication.  I have outlined the physical activity suitable for each benchmark between 6 weeks, 3 months in 1 year.  At this time, we will plan to see the patient back on an as-needed basis.    Please note, this document was produced using voice recognition software.    GRACE Nichols  [facility] Cardiothoracic Surgery

## 2020-04-06 NOTE — PROGRESS NOTES
[facility] Cardiology  Subjective:     Encounter Date:04/07/2020      Patient ID: Ross Quintero is a 54 y.o.  male. He is employed at [employer].    Chief Complaint: Coronary artery disease status post coronary artery bypass      PROBLEM LIST:  1. Coronary artery disease:  a. [facility], 1/30/2020: 80% ostial LAD, involving origin of a large ramus, large bifurcating diagonal. Abnormal IFR in each vessel. 70% stenosis mid LAD. 70% upper division D1. 90% distal RCA.  b. CABG x4, 02/05/2020, Dr. Pardo: Vein graft to diagonal into ramus circumflex, vein graft to PDA, LIMA to LAD.  2. Cardiomyopathy/systolic congestive heart failure  a. Echocardiogram, 01/15/2020: EF 15%. Mild to moderate MR. RVSP 45 mmHg. Biatrial enlargement. Dilated RV with decreased RV systolic function.  b. Echocardiogram, 02/10/2020: EF 30%.  3. Hypertension  4. Dyslipidemia  5. ETOH abuse  6. Tobacco abuse  a. Resolved 1/20  7. Surgeries:  a. Lasik surgery  b. Vasectomy       History of Present Illness  Ross Quintero has telephone visit today for one month follow up with a history of coronary artery disease, cardiomyopathy, and cardiac risk factors. Since last visit  feels essentially unchanged.  His cardiac rehab is been canceled due to the pandemic, and he is doing some exercising and walking at home, although he notices he is not as motivated as he wants was.  His he walks proximal 1 mile daily without symptoms.  He is doing some mild work around the house but not significant.  Denies any chest pain other than his musculoskeletal pleuritic pain from his surgery.  Denies shortness of breath orthopnea PND claudication tachypalpitations dizziness or lightheadedness.  He is wearing his LifeVest religiously.  He has no longer smoked :-).  He is eager to return to work soon.    No Known Allergies      Current Outpatient Medications:   •  aspirin 81 MG tablet, Take 1 tablet by mouth Daily., Disp: 30  "tablet, Rfl: 11  •  atorvastatin (LIPITOR) 40 MG tablet, Take 1 tablet by mouth Daily., Disp: 30 tablet, Rfl: 11  •  carvedilol (COREG) 6.25 MG tablet, Take 1 tablet by mouth Every 12 (Twelve) Hours., Disp: 60 tablet, Rfl: 5  •  sacubitril-valsartan (ENTRESTO) 24-26 MG tablet, Take 1 tablet by mouth 2 (Two) Times a Day., Disp: 60 tablet, Rfl: 11  •  varenicline (CHANTIX) 1 MG tablet, Take 1 mg by mouth 2 (Two) Times a Day., Disp: , Rfl:   •  zolpidem (AMBIEN) 10 MG tablet, Take 10 mg by mouth At Night As Needed for Sleep., Disp: , Rfl:     The following portions of the patient's history were reviewed and updated as appropriate: allergies, current medications, past family history, past medical history, past social history, past surgical history and problem list.    Review of Systems   Constitution: Negative.   Cardiovascular: Negative.    Respiratory: Negative.    Hematologic/Lymphatic: Negative for bleeding problem. Does not bruise/bleed easily.   Skin: Negative for rash.   Musculoskeletal: Negative for muscle weakness and myalgias.   Gastrointestinal: Positive for heartburn. Negative for nausea and vomiting.   Neurological: Negative.           Objective:     Vitals:    04/07/20 1257   BP: 142/82   BP Location: Left arm   Patient Position: Sitting   Pulse: 80   Weight: 105 kg (232 lb)   Height: 172.7 cm (68\")         Physical Exam  No physical exam performed secondary to telephone visit.     Lab Review:  Lab Results   Component Value Date    GLUCOSE 109 (H) 02/17/2020    BUN 12 02/17/2020    CREATININE 0.86 02/17/2020    EGFRIFNONA 93 02/17/2020    EGFRIFAFRI 113 02/17/2020    BCR 14.0 02/17/2020    K 4.6 02/17/2020    CO2 26.5 02/17/2020    CALCIUM 10.6 (H) 02/17/2020    ALBUMIN 4.40 02/06/2020    ALKPHOS 83 02/04/2020    AST 19 02/04/2020    ALT 19 02/04/2020     Lab Results   Component Value Date    CHOL 210 (H) 01/30/2020    TRIG 96 01/30/2020    HDL 42 01/30/2020     (H) 01/30/2020      Lab Results "   Component Value Date    WBC 6.05 02/10/2020    RBC 2.95 (L) 02/10/2020    HGB 9.5 (L) 02/10/2020    HCT 28.1 (L) 02/10/2020    MCV 95.3 02/10/2020     02/10/2020       Procedures        Assessment:   Ross was seen today for coronary artery disease status post coronary artery bypass.    Diagnoses and all orders for this visit:    Atherosclerosis of native coronary artery of native heart without angina pectoris    Ischemic cardiomyopathy    Essential hypertension    Pure hypercholesterolemia        Plan:  1. Coronary artery disease, 2-month status post CABG.  Stable without angina.  Needs to continue cardiac rehab at home as much as possible.  Discussed this with the patient.  2. Given his now hypertension, and ischemic cardiomyopathy, we will increase his Entresto to 49/51 mg starting today.  3. Patient will monitor his blood pressure, and if systolic blood pressure still greater than 115-120 in 2 weeks time he will increase his Coreg to 12.5 mg twice daily.  4. We will check an echocardiogram, 90 days after his CABG to evaluate for possible permanent ICD  5. Patient will remain off work until that time.  6. Continue current medications.  7. Revisit in 1 month time after the echo or sooner as needed.      This patient has consented to a telehealth visit via telephone. The visit was scheduled as a telephone visit to comply with patient safety concerns in accordance with CDC recommendations.  All vitals recorded within this visit are reported by the patient.  I spent 20 minutes in total including but not limited to the 12 minutes spent in direct conversation with this patient.       Darinel Martinez MD      Please note that portions of this note may have been completed with a voice recognition program. Efforts were made to edit the dictations, but occasionally words are mistranscribed.

## 2020-04-07 ENCOUNTER — OFFICE VISIT (OUTPATIENT)
Dept: CARDIOLOGY | Facility: CLINIC | Age: 55
End: 2020-04-07

## 2020-04-07 VITALS
BODY MASS INDEX: 35.16 KG/M2 | SYSTOLIC BLOOD PRESSURE: 142 MMHG | HEART RATE: 80 BPM | WEIGHT: 232 LBS | DIASTOLIC BLOOD PRESSURE: 82 MMHG | HEIGHT: 68 IN

## 2020-04-07 DIAGNOSIS — I25.10 ATHEROSCLEROSIS OF NATIVE CORONARY ARTERY OF NATIVE HEART WITHOUT ANGINA PECTORIS: Primary | ICD-10-CM

## 2020-04-07 DIAGNOSIS — E78.00 PURE HYPERCHOLESTEROLEMIA: ICD-10-CM

## 2020-04-07 DIAGNOSIS — I25.5 ISCHEMIC CARDIOMYOPATHY: ICD-10-CM

## 2020-04-07 DIAGNOSIS — I10 ESSENTIAL HYPERTENSION: ICD-10-CM

## 2020-04-07 PROCEDURE — 99214 OFFICE O/P EST MOD 30 MIN: CPT | Performed by: INTERNAL MEDICINE

## 2020-04-07 RX ORDER — ATORVASTATIN CALCIUM 40 MG/1
40 TABLET, FILM COATED ORAL DAILY
Qty: 30 TABLET | Refills: 11 | Status: SHIPPED | OUTPATIENT
Start: 2020-04-07 | End: 2021-05-13 | Stop reason: SDUPTHER

## 2020-04-07 RX ORDER — CARVEDILOL 12.5 MG/1
12.5 TABLET ORAL 2 TIMES DAILY
Qty: 60 TABLET | Refills: 11 | Status: SHIPPED | OUTPATIENT
Start: 2020-04-07

## 2020-05-08 ENCOUNTER — HOSPITAL ENCOUNTER (OUTPATIENT)
Dept: CARDIOLOGY | Facility: HOSPITAL | Age: 55
Discharge: HOME | End: 2020-05-08
Admitting: INTERNAL MEDICINE

## 2020-05-08 VITALS — BODY MASS INDEX: 35.16 KG/M2 | HEIGHT: 68 IN | WEIGHT: 232 LBS

## 2020-05-08 DIAGNOSIS — I25.10 ATHEROSCLEROSIS OF NATIVE CORONARY ARTERY OF NATIVE HEART WITHOUT ANGINA PECTORIS: ICD-10-CM

## 2020-05-08 PROCEDURE — 93308 TTE F-UP OR LMTD: CPT

## 2020-05-08 PROCEDURE — 25010000002 SULFUR HEXAFLUORIDE MICROSPH 60.7-25 MG RECONSTITUTED SUSPENSION: Performed by: INTERNAL MEDICINE

## 2020-05-08 PROCEDURE — 93308 TTE F-UP OR LMTD: CPT | Performed by: INTERNAL MEDICINE

## 2020-05-08 RX ADMIN — SULFUR HEXAFLUORIDE 4 ML: KIT at 16:50

## 2020-05-11 ENCOUNTER — TELEPHONE (OUTPATIENT)
Dept: CARDIOLOGY | Facility: CLINIC | Age: 55
End: 2020-05-11

## 2020-05-11 LAB
BH CV ECHO MEAS - BSA(HAYCOCK): 2.3 M^2
BH CV ECHO MEAS - BSA: 2.2 M^2
BH CV ECHO MEAS - BZI_BMI: 35.3 KILOGRAMS/M^2
BH CV ECHO MEAS - BZI_METRIC_HEIGHT: 172.7 CM
BH CV ECHO MEAS - BZI_METRIC_WEIGHT: 105.2 KG
BH CV ECHO MEAS - EDV(CUBED): 220.5 ML
BH CV ECHO MEAS - EDV(MOD-SP2): 107 ML
BH CV ECHO MEAS - EDV(MOD-SP4): 111 ML
BH CV ECHO MEAS - EDV(TEICH): 182.9 ML
BH CV ECHO MEAS - EF(CUBED): 45.2 %
BH CV ECHO MEAS - EF(MOD-BP): 45 %
BH CV ECHO MEAS - EF(MOD-SP2): 40.2 %
BH CV ECHO MEAS - EF(MOD-SP4): 37.8 %
BH CV ECHO MEAS - EF(TEICH): 37 %
BH CV ECHO MEAS - ESV(CUBED): 120.9 ML
BH CV ECHO MEAS - ESV(MOD-SP2): 64 ML
BH CV ECHO MEAS - ESV(MOD-SP4): 69 ML
BH CV ECHO MEAS - ESV(TEICH): 115.2 ML
BH CV ECHO MEAS - FS: 18.2 %
BH CV ECHO MEAS - IVS/LVPW: 0.99
BH CV ECHO MEAS - IVSD: 1 CM
BH CV ECHO MEAS - LV DIASTOLIC VOL/BSA (35-75): 51 ML/M^2
BH CV ECHO MEAS - LV MASS(C)D: 266.9 GRAMS
BH CV ECHO MEAS - LV MASS(C)DI: 122.6 GRAMS/M^2
BH CV ECHO MEAS - LV SYSTOLIC VOL/BSA (12-30): 31.7 ML/M^2
BH CV ECHO MEAS - LVIDD: 6 CM
BH CV ECHO MEAS - LVIDS: 4.9 CM
BH CV ECHO MEAS - LVLD AP2: 8.3 CM
BH CV ECHO MEAS - LVLD AP4: 8 CM
BH CV ECHO MEAS - LVLS AP2: 8.1 CM
BH CV ECHO MEAS - LVLS AP4: 8.1 CM
BH CV ECHO MEAS - LVPWD: 1.1 CM
BH CV ECHO MEAS - SI(CUBED): 45.8 ML/M^2
BH CV ECHO MEAS - SI(MOD-SP2): 19.8 ML/M^2
BH CV ECHO MEAS - SI(MOD-SP4): 19.3 ML/M^2
BH CV ECHO MEAS - SI(TEICH): 31.1 ML/M^2
BH CV ECHO MEAS - SV(CUBED): 99.6 ML
BH CV ECHO MEAS - SV(MOD-SP2): 43 ML
BH CV ECHO MEAS - SV(MOD-SP4): 42 ML
BH CV ECHO MEAS - SV(TEICH): 67.6 ML
BH CV VAS BP LEFT ARM: NORMAL MMHG
LV EF 2D ECHO EST: 45 %
MAXIMAL PREDICTED HEART RATE: 167 BPM
STRESS TARGET HR: 142 BPM

## 2020-05-11 NOTE — TELEPHONE ENCOUNTER
Spoke with patient, advised of below, return to work note faxed to [phone], confirmation received.       ----- Message from Darinel Martinez MD sent at 5/11/2020  3:58 PM EDT -----  LVEF has returned to near normal.  May discontinue LifeVest and may return to work

## 2020-07-07 ENCOUNTER — OFFICE VISIT (OUTPATIENT)
Dept: CARDIOLOGY | Facility: CLINIC | Age: 55
End: 2020-07-07

## 2020-07-07 VITALS
WEIGHT: 236 LBS | SYSTOLIC BLOOD PRESSURE: 132 MMHG | HEIGHT: 68 IN | BODY MASS INDEX: 35.77 KG/M2 | DIASTOLIC BLOOD PRESSURE: 78 MMHG | HEART RATE: 75 BPM

## 2020-07-07 DIAGNOSIS — E78.00 PURE HYPERCHOLESTEROLEMIA: ICD-10-CM

## 2020-07-07 DIAGNOSIS — I10 ESSENTIAL HYPERTENSION: ICD-10-CM

## 2020-07-07 DIAGNOSIS — I25.5 ISCHEMIC CARDIOMYOPATHY: ICD-10-CM

## 2020-07-07 DIAGNOSIS — I25.10 ATHEROSCLEROSIS OF NATIVE CORONARY ARTERY OF NATIVE HEART WITHOUT ANGINA PECTORIS: Primary | ICD-10-CM

## 2020-07-07 PROCEDURE — 99214 OFFICE O/P EST MOD 30 MIN: CPT | Performed by: INTERNAL MEDICINE

## 2020-07-07 RX ORDER — FUROSEMIDE 40 MG/1
40 TABLET ORAL EVERY OTHER DAY
COMMUNITY

## 2020-07-07 RX ORDER — ESCITALOPRAM OXALATE 10 MG/1
10 TABLET ORAL DAILY
COMMUNITY

## 2020-07-07 RX ORDER — POTASSIUM CHLORIDE 750 MG/1
10 TABLET, FILM COATED, EXTENDED RELEASE ORAL EVERY OTHER DAY
COMMUNITY

## 2020-07-07 NOTE — PROGRESS NOTES
Subjective:     Encounter Date:07/07/2020    Primary Care Physician: Saul Cowan MD      Patient ID: Ross Quintero is a 55 y.o. male.    Chief Complaint:Abnormal echocardiogram and Coronary artery disease status post coronary artery bypass g    PROBLEM LIST:  1. Coronary artery disease:  a. [facility], 1/30/2020: 80% ostial LAD, involving origin of a large ramus, large bifurcating diagonal. Abnormal IFR in each vessel. 70% stenosis mid LAD. 70% upper division D1. 90% distal RCA.  b. CABG x4, 02/05/2020, Dr. aPrdo: Vein graft to diagonal into ramus circumflex, vein graft to PDA, LIMA to LAD.  2. Cardiomyopathy/systolic congestive heart failure  a. Echocardiogram, 01/15/2020: EF 15%. Mild to moderate MR. RVSP 45 mmHg. Biatrial enlargement. Dilated RV with decreased RV systolic function.  b. Echocardiogram, 02/10/2020: EF 30%.  c. 5/11/2020 ECHO EF 45%.  3. Hypertension  4. Dyslipidemia  5. ETOH abuse  6. Tobacco abuse  a. Resolved 1/20  7. Surgeries:  a. Lasik surgery  b. Vasectomy   c. [place] teeth extraction       No Known Allergies      Current Outpatient Medications:   •  aspirin 81 MG tablet, Take 1 tablet by mouth Daily., Disp: 30 tablet, Rfl: 11  •  atorvastatin (LIPITOR) 40 MG tablet, Take 1 tablet by mouth Daily., Disp: 30 tablet, Rfl: 11  •  carvedilol (COREG) 12.5 MG tablet, Take 1 tablet by mouth 2 (Two) Times a Day., Disp: 60 tablet, Rfl: 11  •  escitalopram (LEXAPRO) 10 MG tablet, Take 10 mg by mouth Daily., Disp: , Rfl:   •  furosemide (LASIX) 40 MG tablet, Take 40 mg by mouth Every Other Day., Disp: , Rfl:   •  potassium chloride (K-DUR) 10 MEQ CR tablet, Take 10 mEq by mouth Every Other Day., Disp: , Rfl:   •  sacubitril-valsartan (ENTRESTO) 49-51 MG tablet, Take 1 tablet by mouth 2 (Two) Times a Day., Disp: 60 tablet, Rfl: 11  •  varenicline (CHANTIX) 1 MG tablet, Take 1 mg by mouth 2 (Two) Times a Day., Disp: , Rfl:   •  zolpidem (AMBIEN) 10 MG tablet, Take 10 mg by mouth At Night As Needed  for Sleep., Disp: , Rfl:         History of Present Illness    Patient returns today for 6-month follow-up status post CABG.  His LVEF has returned to near normal.  He overall feels well, is very active doing cardiac rehab and is returned to work.  He is looking for extra gyms to go to after rehab is been completed.  He transiently stopped his diuretics but noticed his blood pressure increased.  By going on an every other day they have returned to normal.  No orthopnea PND chest pain overall feels significantly better than he did presurgery.  Patient had 1 episode where he called the rescue squad, this occurred when he is having difficulty getting his medicines exactly 12 hours apart, and he was late for dose, and when he took his regular scheduled dose approximately 8 hours later he became slightly hypotensive and symptomatic.    The following portions of the patient's history were reviewed and updated as appropriate: allergies, current medications, past family history, past medical history, past social history, past surgical history and problem list.      Social History     Tobacco Use   • Smoking status: Former Smoker     Packs/day: 1.00     Years: 30.00     Pack years: 30.00     Types: Cigarettes     Last attempt to quit: 2020     Years since quittin.4   • Smokeless tobacco: Former User     Types: Snuff     Quit date:    Substance Use Topics   • Alcohol use: Not Currently     Comment: quit 2 months ago   • Drug use: Defer         Review of Systems   Constitution: Negative.   HENT: Positive for tinnitus.    Cardiovascular: Negative for chest pain, dyspnea on exertion, leg swelling, palpitations and syncope.   Respiratory: Negative.  Negative for shortness of breath.    Hematologic/Lymphatic: Negative for bleeding problem. Bruises/bleeds easily.   Skin: Negative for rash.   Musculoskeletal: Negative for muscle weakness and myalgias.   Gastrointestinal: Positive for diarrhea. Negative for heartburn,  "nausea and vomiting.   Neurological: Negative for dizziness, light-headedness, loss of balance and numbness.          Objective:   /78   Pulse 75   Ht 172.7 cm (68\")   Wt 107 kg (236 lb)   BMI 35.88 kg/m²         Physical Exam   Constitutional: He is oriented to person, place, and time. He appears well-developed and well-nourished.   HENT:   Mouth/Throat: Oropharynx is clear and moist.   Neck: No JVD present. Carotid bruit is not present. No thyromegaly present.   Cardiovascular: Regular rhythm, S1 normal, S2 normal, normal heart sounds and intact distal pulses. Exam reveals no gallop, no S3 and no S4.   No murmur heard.  Pulses:       Carotid pulses are 2+ on the right side, and 2+ on the left side.       Radial pulses are 2+ on the right side, and 2+ on the left side.   Pulmonary/Chest: Breath sounds normal.   Abdominal: Soft. Bowel sounds are normal. He exhibits no mass. There is no tenderness.   Musculoskeletal: He exhibits no edema.   Neurological: He is alert and oriented to person, place, and time.   Skin: Skin is warm and dry. No rash noted.       Procedures          Assessment:   Assessment/Plan      Ross was seen today for abnormal echocardiogram and coronary artery disease status post coronary artery bypass g.    Diagnoses and all orders for this visit:    Atherosclerosis of native coronary artery of native heart without angina pectoris    Ischemic cardiomyopathy    Pure hypercholesterolemia    Essential hypertension      1.  Coronary disease, 6-month status post CABG.  No current angina very active  2.  Ischemic cardiomyopathy now resolved.  On Entresto and carvedilol.  3.  Hypertension well-controlled  4.  Dyslipidemia on high-dose statin  5.  Tobacco abuse, resolved :-).    Recommendations  1.  Discussed the patient that, if he is more than 2 hours late for any of his cardiovascular medicines is simply \"skip them\", and take the next scheduled dose.  2.  Continue current cardiac meds.  3.  " Revisit 6 months or PRN symptom change     Darinel Martinez MD      Dictated utilizing Dragon dictation

## 2021-05-13 RX ORDER — ATORVASTATIN CALCIUM 40 MG/1
40 TABLET, FILM COATED ORAL DAILY
Qty: 90 TABLET | Refills: 1 | Status: SHIPPED | OUTPATIENT
Start: 2021-05-13

## 2021-11-16 RX ORDER — ATORVASTATIN CALCIUM 40 MG/1
TABLET, FILM COATED ORAL
Qty: 90 TABLET | Refills: 0 | OUTPATIENT
Start: 2021-11-16

## 2023-08-01 ENCOUNTER — TELEPHONE (OUTPATIENT)
Dept: CARDIOLOGY | Facility: CLINIC | Age: 58
End: 2023-08-01
Payer: COMMERCIAL

## 2023-09-15 NOTE — PROGRESS NOTES
Subjective:     Encounter Date:09/20/2023    Primary Care Physician: Saul Cowan MD      Patient ID: Ross Quintero is a 58 y.o. male.    Chief Complaint:pre op clearance, CAD    PROBLEM LIST:  Coronary artery disease:  [facility], 1/30/2020: 80% ostial LAD, involving origin of a large ramus, large bifurcating diagonal. Abnormal IFR in each vessel. 70% stenosis mid LAD. 70% upper division D1. 90% distal RCA.  CABG x4, 02/05/2020, Dr. Pardo: Vein graft to diagonal into ramus circumflex, vein graft to PDA, LIMA to LAD.  Cardiomyopathy/systolic congestive heart failure  Echocardiogram, 01/15/2020: EF 15%. Mild to moderate MR. RVSP 45 mmHg. Biatrial enlargement. Dilated RV with decreased RV systolic function.  Echocardiogram, 02/10/2020: EF 30%.  5/11/2020 ECHO EF 45%.  Hypertension  Dyslipidemia  ETOH abuse  Resolved/23  Tobacco abuse  Resolved 1/20  Nephrolithiasis  Surgeries:  Lasik surgery  Vasectomy   [place] teeth extraction        No Known Allergies      Current Outpatient Medications:     atorvastatin (LIPITOR) 40 MG tablet, Take 1 tablet by mouth Daily., Disp: 90 tablet, Rfl: 1    carvedilol (COREG) 12.5 MG tablet, Take 1 tablet by mouth 2 (Two) Times a Day. (Patient taking differently: Take 0.5 tablets by mouth 2 (Two) Times a Day.), Disp: 60 tablet, Rfl: 11    cefdinir (OMNICEF) 300 MG capsule, Take 1 capsule by mouth Daily., Disp: , Rfl:     folic acid (FOLVITE) 1 MG tablet, Take 1 tablet by mouth Daily., Disp: , Rfl:     omeprazole (priLOSEC) 40 MG capsule, Take 1 capsule by mouth Daily., Disp: , Rfl:     ondansetron ODT (ZOFRAN-ODT) 4 MG disintegrating tablet, 1 tablet., Disp: , Rfl:     potassium chloride (K-DUR) 10 MEQ CR tablet, Take 1 tablet by mouth Every Other Day., Disp: , Rfl:     zolpidem (AMBIEN) 10 MG tablet, Take 1 tablet by mouth At Night As Needed for Sleep., Disp: , Rfl:         History of Present Illness    Patient is a 58-year-old  male who presents today for reestablishment  of cardiac care and preoperative evaluation prior to EGD and colonoscopy.  Patient has previous history of ischemic cardiomyopathy and coronary artery disease with previous coronary artery bypass grafting.  He has been lost to follow-up for the last 3 years.  He denies any chest pain, pressure, tightness.  Denies any increasing shortness of breath.  No reported syncope, near-syncope, or edema.  Will be undergoing EGD and colonoscopy for further evaluation of anemia.  Patient was noted to be having some hypotension and his primary care physician discontinued his Entresto and decreased his carvedilol dose.  He was also felt to have a possible peptic ulcer secondary to alcohol and aspirin use and his aspirin was discontinued as well.  Patient has not had any cardiac testing other than a recent EKG since being seen 3 years ago.    The following portions of the patient's history were reviewed and updated as appropriate: allergies, current medications, past family history, past medical history, past social history, past surgical history and problem list.    Family History   Adopted: Yes       Social History     Tobacco Use    Smoking status: Former     Packs/day: 1.00     Years: 30.00     Pack years: 30.00     Types: Cigarettes     Quit date: 2/2/2020     Years since quitting: 3.6    Smokeless tobacco: Former     Types: Snuff     Quit date: 2005   Substance Use Topics    Alcohol use: Not Currently     Comment: quit 2 months ago    Drug use: Defer         Review of Systems   Constitutional: Positive for malaise/fatigue. Negative for fever.   HENT:  Negative for nosebleeds.    Eyes:  Negative for redness and visual disturbance.   Cardiovascular:  Negative for orthopnea, palpitations and paroxysmal nocturnal dyspnea.   Respiratory:  Negative for cough, snoring, sputum production and wheezing.    Hematologic/Lymphatic: Negative for bleeding problem.   Skin:  Negative for flushing, itching and rash.   Musculoskeletal:   "Negative for falls, joint pain and muscle cramps.   Gastrointestinal:  Negative for abdominal pain, diarrhea, heartburn, nausea and vomiting.   Genitourinary:  Negative for hematuria.   Neurological:  Negative for excessive daytime sleepiness, dizziness, headaches, tremors and weakness.   Psychiatric/Behavioral:  Negative for substance abuse. The patient is not nervous/anxious.         Objective:   /86   Pulse 83   Ht 172.7 cm (68\")   Wt 111 kg (244 lb)   SpO2 97%   BMI 37.10 kg/m²         Vitals reviewed.   Constitutional:       Appearance: Healthy appearance. Well-developed and not in distress.   Eyes:      Conjunctiva/sclera: Conjunctivae normal.      Pupils: Pupils are equal, round, and reactive to light.   HENT:      Head: Normocephalic and atraumatic.    Mouth/Throat:      Pharynx: Oropharynx is clear.   Neck:      Thyroid: Thyroid normal. No thyromegaly.      Vascular: Normal carotid pulses. No carotid bruit or JVD. JVD normal.      Lymphadenopathy: No cervical adenopathy.   Pulmonary:      Effort: No respiratory distress.      Breath sounds: No wheezing. No rales.   Chest:      Chest wall: Not tender to palpatation.   Cardiovascular:      Normal rate. Regular rhythm.      No gallop.    Pulses:     Carotid: 2+ bilaterally.     Dorsalis pedis: 2+ bilaterally.     Posterior tibial: 2+ bilaterally.  Edema:     Peripheral edema absent.   Abdominal:      General: There is no distension or abdominal bruit.      Palpations: There is no abdominal mass.      Tenderness: There is no abdominal tenderness. There is no rebound.   Musculoskeletal:         General: No tenderness or deformity.      Extremities: No clubbing present.Skin:     General: Skin is warm and dry. There is no cyanosis.      Findings: No rash.   Neurological:      Mental Status: Alert, oriented to person, place, and time and oriented to person, place and time.       Procedures  EKG from 9/18/2023 reviewed.  Sinus bradycardia with nonspecific " ST and T wave changes.        Assessment:   Assessment & Plan      Diagnoses and all orders for this visit:    1. Atherosclerosis of native coronary artery of native heart without angina pectoris (Primary)      1.  Coronary artery disease, 3 years post CABG.  Stable no angina  2.  Cardiomyopathy, last LVEF was 45% several years ago.  Currently euvolemic without heart failure.  Now off Entresto due to hypotension.  3.  Hypertension well-controlled  4.  Dyslipidemia last LDL was 70 on high intensity statin  5.  Tobacco abuse resolved last 3 years  6.  Alcohol abuse, resolved 6 months ago.  This may have been playing a role in his cardiomyopathy  7.  Anemia, currently undergoing EGD/colonoscopy work-up    Recommendations:  1.  Low cardiovascular risk of upcoming EGD colonoscopy.  May proceed as planned.  2.  Continue to hold aspirin for now pending the above results.  Will need an antiplatelet resumed at some point but can wait till after GI evaluation/treatment.  3.  Regarding his Entresto is possible his LVEF has normalized without alcohol use.  We will simply recheck an echocardiogram in approximately 6 months if patient remains asymptomatic.  Based on his LVEF we can decide whether or not this needs to be resumed.  4.  Revisit 6 months or as needed symptom change       Radha EDWARDS scribed portions of this dictation for Dr. Darinel Martinez MD          Dictated utilizing Dragon dictation

## 2023-09-20 ENCOUNTER — OFFICE VISIT (OUTPATIENT)
Dept: CARDIOLOGY | Facility: CLINIC | Age: 58
End: 2023-09-20
Payer: COMMERCIAL

## 2023-09-20 VITALS
HEART RATE: 83 BPM | DIASTOLIC BLOOD PRESSURE: 86 MMHG | BODY MASS INDEX: 36.98 KG/M2 | WEIGHT: 244 LBS | HEIGHT: 68 IN | SYSTOLIC BLOOD PRESSURE: 143 MMHG | OXYGEN SATURATION: 97 %

## 2023-09-20 DIAGNOSIS — I25.10 ATHEROSCLEROSIS OF NATIVE CORONARY ARTERY OF NATIVE HEART WITHOUT ANGINA PECTORIS: Primary | ICD-10-CM

## 2023-09-20 DIAGNOSIS — I25.10 ATHEROSCLEROSIS OF NATIVE CORONARY ARTERY OF NATIVE HEART WITHOUT ANGINA PECTORIS: ICD-10-CM

## 2023-09-20 DIAGNOSIS — I25.5 ISCHEMIC CARDIOMYOPATHY: ICD-10-CM

## 2023-09-20 DIAGNOSIS — I50.22 CHRONIC SYSTOLIC CHF (CONGESTIVE HEART FAILURE): Primary | ICD-10-CM

## 2023-09-20 RX ORDER — CEFDINIR 300 MG/1
300 CAPSULE ORAL DAILY
COMMUNITY
Start: 2023-09-19

## 2023-09-20 RX ORDER — FOLIC ACID 1 MG/1
1 TABLET ORAL DAILY
COMMUNITY
Start: 2023-09-07

## 2023-09-20 RX ORDER — ONDANSETRON 4 MG/1
4 TABLET, ORALLY DISINTEGRATING ORAL
COMMUNITY
Start: 2023-09-19

## 2023-09-20 RX ORDER — OMEPRAZOLE 40 MG/1
40 CAPSULE, DELAYED RELEASE ORAL DAILY
COMMUNITY
Start: 2023-07-25

## 2023-10-06 ENCOUNTER — OFFICE (OUTPATIENT)
Dept: URBAN - METROPOLITAN AREA PATHOLOGY 4 | Facility: PATHOLOGY | Age: 58
End: 2023-10-06

## 2023-10-06 ENCOUNTER — AMBULATORY SURGICAL CENTER (OUTPATIENT)
Dept: URBAN - METROPOLITAN AREA SURGERY 10 | Facility: SURGERY | Age: 58
End: 2023-10-06

## 2023-10-06 DIAGNOSIS — D12.3 BENIGN NEOPLASM OF TRANSVERSE COLON: ICD-10-CM

## 2023-10-06 DIAGNOSIS — D12.4 BENIGN NEOPLASM OF DESCENDING COLON: ICD-10-CM

## 2023-10-06 DIAGNOSIS — D12.5 BENIGN NEOPLASM OF SIGMOID COLON: ICD-10-CM

## 2023-10-06 DIAGNOSIS — K31.89 OTHER DISEASES OF STOMACH AND DUODENUM: ICD-10-CM

## 2023-10-06 DIAGNOSIS — R13.10 DYSPHAGIA, UNSPECIFIED: ICD-10-CM

## 2023-10-06 DIAGNOSIS — K22.4 DYSKINESIA OF ESOPHAGUS: ICD-10-CM

## 2023-10-06 DIAGNOSIS — K57.30 DIVERTICULOSIS OF LARGE INTESTINE WITHOUT PERFORATION OR ABS: ICD-10-CM

## 2023-10-06 DIAGNOSIS — R12 HEARTBURN: ICD-10-CM

## 2023-10-06 DIAGNOSIS — K44.9 DIAPHRAGMATIC HERNIA WITHOUT OBSTRUCTION OR GANGRENE: ICD-10-CM

## 2023-10-06 DIAGNOSIS — D50.9 IRON DEFICIENCY ANEMIA, UNSPECIFIED: ICD-10-CM

## 2023-10-06 DIAGNOSIS — D12.8 BENIGN NEOPLASM OF RECTUM: ICD-10-CM

## 2023-10-06 DIAGNOSIS — K31.9 DISEASE OF STOMACH AND DUODENUM, UNSPECIFIED: ICD-10-CM

## 2023-10-06 DIAGNOSIS — K64.1 SECOND DEGREE HEMORRHOIDS: ICD-10-CM

## 2023-10-06 PROCEDURE — 43249 ESOPH EGD DILATION <30 MM: CPT | Performed by: INTERNAL MEDICINE

## 2023-10-06 PROCEDURE — 45385 COLONOSCOPY W/LESION REMOVAL: CPT | Performed by: INTERNAL MEDICINE

## 2023-10-06 PROCEDURE — 88305 TISSUE EXAM BY PATHOLOGIST: CPT | Performed by: INTERNAL MEDICINE

## 2023-10-06 PROCEDURE — 43239 EGD BIOPSY SINGLE/MULTIPLE: CPT | Mod: 59 | Performed by: INTERNAL MEDICINE

## 2024-03-01 NOTE — PLAN OF CARE
Problem: Patient Care Overview  Goal: Plan of Care Review  Outcome: Ongoing (interventions implemented as appropriate)  Flowsheets (Taken 2/7/2020 1689)  Progress: improving  Plan of Care Reviewed With: patient  Outcome Summary: PT eval is completed patient is able to ambulate 220 ft with CGA he felt better post ambulation. anticipate patient will go home with family at D/C      Date of Service:  02/29/2024     CHIEF COMPLAINT:  Bilateral thumb pain.    The patient is a 63-year-old female, who has pain along the basal joint of both thumbs.  She denies any injury or trauma.  She has a hard time gripping or grasping because of pain.    The patient's past medical, surgical, social and family history have been reviewed today and have been updated in the patient's medical record.    PHYSICAL EXAMINATION:  GENERAL:  The patient is alert and oriented x3.  The patient is well appearing.  The patient is in no acute distress.   SKIN:  The skin of bilateral upper extremities is pink, warm, and well perfused, without rash or striae.   VASCULAR:  Bilateral upper extremities reveal radial pulses of normal quality.  All of the fingertips are pink, warm, and well perfused.  EXTREMITIES:  Both thumbs reveal prominence at the CMC joint.  There is pain and crepitus with CMC grind and CMC palpation bilaterally.  No signs of trigger thumb bilaterally.  No pain over the 1st compartment bilaterally.  Negative carpal compression testing bilaterally.    IMAGING DATA:  X-rays reveal CMC arthrosis, bilateral thumbs.    IMPRESSION:  Bilateral thumb carpometacarpal arthritis.  The patient would like to try cortisone shot.    INJECTION CONSENT:  The risks and the benefits of the injection were explained to the patient including but not limited to infection; increase in pain; damage to nerves, arteries or tendons; swelling; thinning of the skin or bruising, as well as skin discoloration.  The patient is aware of these risks and the benefits of the injection and is willing to proceed.    Under sterile techniques, the left thumb basal joint was sterilely prepped and injected with 5 mg of dexamethasone, 0.5 cc 1% lidocaine plain.  Next, the right thumb basal joint was sterilely prepped and injected with 5 mg of dexamethasone, 0.5 cc 1% lidocaine plain.  CMC braces were given.  Follow up as needed.        Dictated By:   Javier Kapoor DO  Signing Provider:  Javier Kapoor DO    DPW/AQT  D:  02/29/2024 02:28:45 PM  T:  02/29/2024 08:39:41 PM  Job:  [id number]

## 2024-04-09 ENCOUNTER — HOSPITAL ENCOUNTER (OUTPATIENT)
Dept: CARDIOLOGY | Facility: HOSPITAL | Age: 59
Discharge: HOME | End: 2024-04-09
Admitting: INTERNAL MEDICINE
Payer: COMMERCIAL

## 2024-04-09 DIAGNOSIS — I50.22 CHRONIC SYSTOLIC CHF (CONGESTIVE HEART FAILURE): ICD-10-CM

## 2024-04-09 DIAGNOSIS — I25.10 ATHEROSCLEROSIS OF NATIVE CORONARY ARTERY OF NATIVE HEART WITHOUT ANGINA PECTORIS: ICD-10-CM

## 2024-04-09 DIAGNOSIS — I25.5 ISCHEMIC CARDIOMYOPATHY: ICD-10-CM

## 2024-04-09 PROCEDURE — 93306 TTE W/DOPPLER COMPLETE: CPT

## 2024-04-09 NOTE — PROGRESS NOTES
Subjective:     Encounter Date:04/10/2024    Primary Care Physician: Saul Cowan MD      Patient ID: Ross Quintero is a 58 y.o. male.    Chief Complaint:Follow-up (Discuss echo results)      PROBLEM LIST:  Coronary artery disease:  [facility], 1/30/2020: 80% ostial LAD, involving origin of a large ramus, large bifurcating diagonal. Abnormal IFR in each vessel. 70% stenosis mid LAD. 70% upper division D1. 90% distal RCA.  CABG x4, 02/05/2020, Dr. Pardo: Vein graft to diagonal into ramus circumflex, vein graft to PDA, LIMA to LAD.  Cardiomyopathy/systolic congestive heart failure  Echocardiogram, 01/15/2020: EF 15%. Mild to moderate MR. RVSP 45 mmHg. Biatrial enlargement. Dilated RV with decreased RV systolic function.  Echocardiogram, 02/10/2020: EF 30%.  5/11/2020 ECHO EF 45%.  4/9/24 echo EF 51 to 55%.  No significant valve disease.  Hypertension  Dyslipidemia  ETOH abuse  Resolved/23  Tobacco abuse  Resolved 1/20  Nephrolithiasis  Surgeries:  Lasik surgery  Vasectomy   [place] teeth extraction      No Known Allergies      Current Outpatient Medications:     carvedilol (COREG) 12.5 MG tablet, Take 1 tablet by mouth 2 (Two) Times a Day. (Patient taking differently: Take 0.5 tablets by mouth 2 (Two) Times a Day.), Disp: 60 tablet, Rfl: 11    omeprazole (priLOSEC) 40 MG capsule, Take 1 capsule by mouth Daily., Disp: , Rfl:     ondansetron ODT (ZOFRAN-ODT) 4 MG disintegrating tablet, 1 tablet., Disp: , Rfl:     potassium chloride (K-DUR) 10 MEQ CR tablet, Take 1 tablet by mouth Every Other Day., Disp: , Rfl:     rosuvastatin (CRESTOR) 20 MG tablet, Take 2 tablets by mouth Daily., Disp: , Rfl:     zolpidem (AMBIEN) 10 MG tablet, Take 1 tablet by mouth At Night As Needed for Sleep., Disp: , Rfl:         History of Present Illness    Patient is a 58-year-old  male who presents today for 6-month follow-up of ischemic cardiomyopathy and chronic HFrEF.  Since last being seen patient notes overall doing well.   "He has had no EtOH intake since the fall.  Overall feels well.  Denies any chest pain, pressure, tightness.  Denies any increasing shortness of breath.  No ported syncope, presyncope, or edema.  Underwent echocardiogram with results as noted in the problem list.    The following portions of the patient's history were reviewed and updated as appropriate: allergies, current medications, past family history, past medical history, past social history, past surgical history and problem list.      Social History     Tobacco Use    Smoking status: Former     Current packs/day: 0.00     Average packs/day: 1 pack/day for 30.0 years (30.0 ttl pk-yrs)     Types: Cigarettes     Start date: 1990     Quit date: 2020     Years since quittin.1    Smokeless tobacco: Former     Types: Snuff     Quit date:    Substance Use Topics    Alcohol use: Not Currently     Comment: quit 2 months ago    Drug use: Defer         ROS       Objective:   /87   Pulse 70   Ht 172.7 cm (68\")   Wt 119 kg (261 lb 6.4 oz)   SpO2 97%   BMI 39.75 kg/m²         Vitals reviewed.   Constitutional:       Appearance: Well-developed and not in distress.   Neck:      Vascular: No JVD.      Trachea: No tracheal deviation.   Pulmonary:      Effort: Pulmonary effort is normal.      Breath sounds: Normal breath sounds.   Cardiovascular:      Normal rate. Regular rhythm.      Murmurs: There is no murmur.   Edema:     Peripheral edema absent.   Musculoskeletal:         General: No deformity. Skin:     General: Skin is warm and dry.   Neurological:      Mental Status: Alert and oriented to person, place, and time.         Procedures          Assessment:   Assessment & Plan      Diagnoses and all orders for this visit:    1. Chronic systolic CHF (congestive heart failure) (Primary) stable.  LVEF normalized by echo performed yesterday.  On beta-blocker.    2. Coronary artery disease involving native coronary artery of native heart without angina " pectoris, stable.  4 years status post CABG.  On aspirin.    3. Dyslipidemia, stable.  Labs with primary care.  On statin.      Plan:  Patient is overall stable from cardiac standpoint.  Reviewed echocardiogram results with patient and family in the office today.  Continue current cardiac medications.  Will check myocardial perfusion study close to next year's visit as patient will be 5 years status post CABG.  Follow-up in 1 years time or sooner if needed.       GRACE Pink         Dictated utilizing Dragon dictation

## 2024-04-10 ENCOUNTER — OFFICE VISIT (OUTPATIENT)
Dept: CARDIOLOGY | Facility: CLINIC | Age: 59
End: 2024-04-10
Payer: COMMERCIAL

## 2024-04-10 VITALS
OXYGEN SATURATION: 97 % | HEIGHT: 68 IN | HEART RATE: 70 BPM | DIASTOLIC BLOOD PRESSURE: 87 MMHG | SYSTOLIC BLOOD PRESSURE: 128 MMHG | BODY MASS INDEX: 39.62 KG/M2 | WEIGHT: 261.4 LBS

## 2024-04-10 DIAGNOSIS — E78.5 DYSLIPIDEMIA: ICD-10-CM

## 2024-04-10 DIAGNOSIS — I50.22 CHRONIC SYSTOLIC CHF (CONGESTIVE HEART FAILURE): Primary | ICD-10-CM

## 2024-04-10 DIAGNOSIS — I25.10 CORONARY ARTERY DISEASE INVOLVING NATIVE CORONARY ARTERY OF NATIVE HEART WITHOUT ANGINA PECTORIS: ICD-10-CM

## 2024-04-10 LAB
BH CV ECHO MEAS - AO MAX PG: 3.3 MMHG
BH CV ECHO MEAS - AO MEAN PG: 1.77 MMHG
BH CV ECHO MEAS - AO ROOT DIAM: 3.7 CM
BH CV ECHO MEAS - AO V2 MAX: 90.9 CM/SEC
BH CV ECHO MEAS - AO V2 VTI: 22 CM
BH CV ECHO MEAS - AVA(I,D): 2.6 CM2
BH CV ECHO MEAS - EDV(CUBED): 110.2 ML
BH CV ECHO MEAS - EDV(MOD-SP2): 115 ML
BH CV ECHO MEAS - EDV(MOD-SP4): 129 ML
BH CV ECHO MEAS - EF(MOD-BP): 52 %
BH CV ECHO MEAS - EF(MOD-SP2): 54.8 %
BH CV ECHO MEAS - EF(MOD-SP4): 48.8 %
BH CV ECHO MEAS - ESV(CUBED): 51.4 ML
BH CV ECHO MEAS - ESV(MOD-SP2): 52 ML
BH CV ECHO MEAS - ESV(MOD-SP4): 66 ML
BH CV ECHO MEAS - FS: 22.4 %
BH CV ECHO MEAS - IVS/LVPW: 1 CM
BH CV ECHO MEAS - IVSD: 1.21 CM
BH CV ECHO MEAS - LA DIMENSION: 4.4 CM
BH CV ECHO MEAS - LV MASS(C)D: 221.5 GRAMS
BH CV ECHO MEAS - LV MAX PG: 1.47 MMHG
BH CV ECHO MEAS - LV MEAN PG: 0.69 MMHG
BH CV ECHO MEAS - LV V1 MAX: 60.7 CM/SEC
BH CV ECHO MEAS - LV V1 VTI: 16.3 CM
BH CV ECHO MEAS - LVIDD: 4.8 CM
BH CV ECHO MEAS - LVIDS: 3.7 CM
BH CV ECHO MEAS - LVOT AREA: 3.5 CM2
BH CV ECHO MEAS - LVOT DIAM: 2.12 CM
BH CV ECHO MEAS - LVPWD: 1.21 CM
BH CV ECHO MEAS - MV A MAX VEL: 74.4 CM/SEC
BH CV ECHO MEAS - MV DEC TIME: 0.2 SEC
BH CV ECHO MEAS - MV E MAX VEL: 75.4 CM/SEC
BH CV ECHO MEAS - MV E/A: 1.01
BH CV ECHO MEAS - RAP SYSTOLE: 3 MMHG
BH CV ECHO MEAS - RVSP: 21 MMHG
BH CV ECHO MEAS - SV(LVOT): 57.5 ML
BH CV ECHO MEAS - SV(MOD-SP2): 63 ML
BH CV ECHO MEAS - SV(MOD-SP4): 63 ML
BH CV ECHO MEAS - TR MAX PG: 18 MMHG
BH CV ECHO MEAS - TR MAX VEL: 213 CM/SEC
LEFT ATRIUM VOLUME INDEX: 30.2 ML/M2
LV EF 2D ECHO EST: 46 %

## 2024-04-10 PROCEDURE — 99214 OFFICE O/P EST MOD 30 MIN: CPT | Performed by: NURSE PRACTITIONER

## 2024-04-10 RX ORDER — ROSUVASTATIN CALCIUM 20 MG/1
40 TABLET, COATED ORAL DAILY
COMMUNITY

## 2024-04-10 RX ORDER — ASPIRIN 81 MG/1
81 TABLET, CHEWABLE ORAL DAILY
COMMUNITY

## 2024-04-10 NOTE — LETTER
April 10, 2024       No Recipients    Patient: Ross Quintero   YOB: 1965   Date of Visit: 4/10/2024     Dear Saul Cowan MD:       Thank you for referring Ross Quintero to me for evaluation. Below are the relevant portions of my assessment and plan of care.    If you have questions, please do not hesitate to call me. I look forward to following Ross along with you.         Sincerely,        GRACE Pink        CC:   No Recipients    Radha Santillan APRN  04/10/24 1603  Sign when Signing Visit  Subjective:     Encounter Date:04/10/2024    Primary Care Physician: Saul Cowan MD      Patient ID: Ross Quintero is a 58 y.o. male.    Chief Complaint:Follow-up (Discuss echo results)      PROBLEM LIST:  Coronary artery disease:  [facility], 1/30/2020: 80% ostial LAD, involving origin of a large ramus, large bifurcating diagonal. Abnormal IFR in each vessel. 70% stenosis mid LAD. 70% upper division D1. 90% distal RCA.  CABG x4, 02/05/2020, Dr. Pardo: Vein graft to diagonal into ramus circumflex, vein graft to PDA, LIMA to LAD.  Cardiomyopathy/systolic congestive heart failure  Echocardiogram, 01/15/2020: EF 15%. Mild to moderate MR. RVSP 45 mmHg. Biatrial enlargement. Dilated RV with decreased RV systolic function.  Echocardiogram, 02/10/2020: EF 30%.  5/11/2020 ECHO EF 45%.  4/9/24 echo EF 51 to 55%.  No significant valve disease.  Hypertension  Dyslipidemia  ETOH abuse  Resolved/23  Tobacco abuse  Resolved 1/20  Nephrolithiasis  Surgeries:  Lasik surgery  Vasectomy   [place] teeth extraction      No Known Allergies      Current Outpatient Medications:   •  carvedilol (COREG) 12.5 MG tablet, Take 1 tablet by mouth 2 (Two) Times a Day. (Patient taking differently: Take 0.5 tablets by mouth 2 (Two) Times a Day.), Disp: 60 tablet, Rfl: 11  •  omeprazole (priLOSEC) 40 MG capsule, Take 1 capsule by mouth Daily., Disp: , Rfl:   •  ondansetron ODT (ZOFRAN-ODT) 4 MG disintegrating  "tablet, 1 tablet., Disp: , Rfl:   •  potassium chloride (K-DUR) 10 MEQ CR tablet, Take 1 tablet by mouth Every Other Day., Disp: , Rfl:   •  rosuvastatin (CRESTOR) 20 MG tablet, Take 2 tablets by mouth Daily., Disp: , Rfl:   •  zolpidem (AMBIEN) 10 MG tablet, Take 1 tablet by mouth At Night As Needed for Sleep., Disp: , Rfl:         History of Present Illness    Patient is a 58-year-old  male who presents today for 6-month follow-up of ischemic cardiomyopathy and chronic HFrEF.  Since last being seen patient notes overall doing well.  He has had no EtOH intake since the fall.  Overall feels well.  Denies any chest pain, pressure, tightness.  Denies any increasing shortness of breath.  No ported syncope, presyncope, or edema.  Underwent echocardiogram with results as noted in the problem list.    The following portions of the patient's history were reviewed and updated as appropriate: allergies, current medications, past family history, past medical history, past social history, past surgical history and problem list.      Social History     Tobacco Use   • Smoking status: Former     Current packs/day: 0.00     Average packs/day: 1 pack/day for 30.0 years (30.0 ttl pk-yrs)     Types: Cigarettes     Start date: 1990     Quit date: 2020     Years since quittin.1   • Smokeless tobacco: Former     Types: Snuff     Quit date:    Substance Use Topics   • Alcohol use: Not Currently     Comment: quit 2 months ago   • Drug use: Defer         ROS       Objective:   /87   Pulse 70   Ht 172.7 cm (68\")   Wt 119 kg (261 lb 6.4 oz)   SpO2 97%   BMI 39.75 kg/m²         Vitals reviewed.   Constitutional:       Appearance: Well-developed and not in distress.   Neck:      Vascular: No JVD.      Trachea: No tracheal deviation.   Pulmonary:      Effort: Pulmonary effort is normal.      Breath sounds: Normal breath sounds.   Cardiovascular:      Normal rate. Regular rhythm.      Murmurs: There is no " murmur.   Edema:     Peripheral edema absent.   Musculoskeletal:         General: No deformity. Skin:     General: Skin is warm and dry.   Neurological:      Mental Status: Alert and oriented to person, place, and time.         Procedures          Assessment:   Assessment & Plan     Diagnoses and all orders for this visit:    1. Chronic systolic CHF (congestive heart failure) (Primary) stable.  LVEF normalized by echo performed yesterday.  On beta-blocker.    2. Coronary artery disease involving native coronary artery of native heart without angina pectoris, stable.  4 years status post CABG.  On aspirin.    3. Dyslipidemia, stable.  Labs with primary care.  On statin.      Plan:  Patient is overall stable from cardiac standpoint.  Reviewed echocardiogram results with patient and family in the office today.  Continue current cardiac medications.  Will check myocardial perfusion study close to next year's visit as patient will be 5 years status post CABG.  Follow-up in 1 years time or sooner if needed.       GRACE Pink         Dictated utilizing Dragon dictation

## 2024-04-12 ENCOUNTER — TELEPHONE (OUTPATIENT)
Dept: CARDIOLOGY | Facility: HOSPITAL | Age: 59
End: 2024-04-12
Payer: COMMERCIAL

## 2024-04-12 NOTE — TELEPHONE ENCOUNTER
Called patient and advised him that p[er Dr. Martinez, his LVEF still normal. Informed him that he can continue current medications and will discuss at revisit. Verbalized understanding.

## 2025-04-02 ENCOUNTER — HOSPITAL ENCOUNTER (OUTPATIENT)
Dept: CARDIOLOGY | Facility: HOSPITAL | Age: 60
Discharge: HOME | End: 2025-04-02
Payer: COMMERCIAL

## 2025-04-02 DIAGNOSIS — I25.10 CORONARY ARTERY DISEASE INVOLVING NATIVE CORONARY ARTERY OF NATIVE HEART WITHOUT ANGINA PECTORIS: ICD-10-CM

## 2025-04-02 DIAGNOSIS — I50.22 CHRONIC SYSTOLIC CHF (CONGESTIVE HEART FAILURE): ICD-10-CM

## 2025-04-02 PROCEDURE — 34310000005 TECHNETIUM SESTAMIBI: Performed by: NURSE PRACTITIONER

## 2025-04-02 PROCEDURE — A9500 TC99M SESTAMIBI: HCPCS | Performed by: NURSE PRACTITIONER

## 2025-04-02 PROCEDURE — 78452 HT MUSCLE IMAGE SPECT MULT: CPT

## 2025-04-02 PROCEDURE — 93017 CV STRESS TEST TRACING ONLY: CPT

## 2025-04-02 RX ADMIN — TECHNETIUM TC 99M SESTAMIBI 1 DOSE: 1 INJECTION INTRAVENOUS at 12:30

## 2025-04-02 RX ADMIN — TECHNETIUM TC 99M SESTAMIBI 1 DOSE: 1 INJECTION INTRAVENOUS at 10:43

## 2025-04-03 ENCOUNTER — RESULTS FOLLOW-UP (OUTPATIENT)
Dept: CARDIOLOGY | Facility: CLINIC | Age: 60
End: 2025-04-03
Payer: COMMERCIAL

## 2025-04-03 LAB
BH CV REST NUCLEAR ISOTOPE DOSE: 9.1 MCI
BH CV STRESS BP STAGE 1: NORMAL
BH CV STRESS BP STAGE 2: NORMAL
BH CV STRESS DURATION MIN STAGE 1: 3
BH CV STRESS DURATION MIN STAGE 2: 3
BH CV STRESS DURATION SEC STAGE 1: 0
BH CV STRESS DURATION SEC STAGE 2: 7
BH CV STRESS GRADE STAGE 1: 10
BH CV STRESS GRADE STAGE 2: 12
BH CV STRESS HR STAGE 1: 114
BH CV STRESS HR STAGE 2: 141
BH CV STRESS METS STAGE 1: 5
BH CV STRESS METS STAGE 2: 7.5
BH CV STRESS NUCLEAR ISOTOPE DOSE: 31.3 MCI
BH CV STRESS O2 STAGE 1: 99
BH CV STRESS O2 STAGE 2: 100
BH CV STRESS PROTOCOL 1: NORMAL
BH CV STRESS RECOVERY BP: NORMAL MMHG
BH CV STRESS RECOVERY HR: 85 BPM
BH CV STRESS RECOVERY O2: 100 %
BH CV STRESS SPEED STAGE 1: 1.7
BH CV STRESS SPEED STAGE 2: 2.5
BH CV STRESS STAGE 1: 1
BH CV STRESS STAGE 2: 2
MAXIMAL PREDICTED HEART RATE: 163 BPM
PERCENT MAX PREDICTED HR: 87.12 %
SPECT HRT GATED+EF W RNC IV: 66 %
STRESS BASELINE BP: NORMAL MMHG
STRESS BASELINE HR: 79 BPM
STRESS O2 SAT REST: 100 %
STRESS PERCENT HR: 102 %
STRESS POST ESTIMATED WORKLOAD: 7 METS
STRESS POST EXERCISE DUR MIN: 6 MIN
STRESS POST EXERCISE DUR SEC: 7 SEC
STRESS POST O2 SAT PEAK: 100 %
STRESS POST PEAK BP: NORMAL MMHG
STRESS POST PEAK HR: 142 BPM
STRESS TARGET HR: 139 BPM

## 2025-04-16 ENCOUNTER — OFFICE VISIT (OUTPATIENT)
Dept: CARDIOLOGY | Facility: CLINIC | Age: 60
End: 2025-04-16
Payer: COMMERCIAL

## 2025-04-16 VITALS
HEART RATE: 56 BPM | HEIGHT: 68 IN | BODY MASS INDEX: 37.65 KG/M2 | SYSTOLIC BLOOD PRESSURE: 153 MMHG | DIASTOLIC BLOOD PRESSURE: 82 MMHG | WEIGHT: 248.4 LBS

## 2025-04-16 DIAGNOSIS — I25.10 ATHEROSCLEROSIS OF NATIVE CORONARY ARTERY OF NATIVE HEART WITHOUT ANGINA PECTORIS: Primary | ICD-10-CM

## 2025-04-16 PROCEDURE — 99214 OFFICE O/P EST MOD 30 MIN: CPT | Performed by: INTERNAL MEDICINE

## 2025-04-16 RX ORDER — DIPHENHYDRAMINE HCL 50 MG
50 CAPSULE ORAL EVERY 6 HOURS PRN
COMMUNITY

## 2025-04-16 RX ORDER — CARVEDILOL 6.25 MG/1
6.25 TABLET ORAL 2 TIMES DAILY WITH MEALS
COMMUNITY

## 2025-04-16 RX ORDER — CARIPRAZINE 3 MG/1
3 CAPSULE, GELATIN COATED ORAL DAILY
COMMUNITY
Start: 2025-04-14

## 2025-04-16 NOTE — PROGRESS NOTES
Subjective:     Encounter Date:04/16/2025    Primary Care Physician: Saul Cowan MD      Patient ID: Ross Quintero is a 59 y.o. male.    Chief Complaint: Coronary artery disease      PROBLEM LIST:  Coronary artery disease:  [facility], 1/30/2020: 80% ostial LAD, involving origin of a large ramus, large bifurcating diagonal. Abnormal IFR in each vessel. 70% stenosis mid LAD. 70% upper division D1. 90% distal RCA.  CABG x4, 02/05/2020, Dr. Pardo: Vein graft to diagonal into ramus circumflex, vein graft to PDA, LIMA to LAD.  4/25.  Normal MPS  Cardiomyopathy/systolic congestive heart failure  Echocardiogram, 01/15/2020: EF 15%. Mild to moderate MR. RVSP 45 mmHg. Biatrial enlargement. Dilated RV with decreased RV systolic function.  Echocardiogram, 02/10/2020: EF 30%.  5/11/2020 ECHO EF 45%.  4/9/24 echo EF 51 to 55%.  No significant valve disease.  Hypertension  Dyslipidemia  ETOH abuse  Resolved/23  Tobacco abuse  Resolved 1/20  Nephrolithiasis  Surgeries:  Lasik surgery  Vasectomy   [place] teeth extraction        No Known Allergies      Current Outpatient Medications:     aspirin 81 MG chewable tablet, Chew 1 tablet Daily., Disp: , Rfl:     carvedilol (COREG) 6.25 MG tablet, Take 1 tablet by mouth 2 (Two) Times a Day With Meals., Disp: , Rfl:     diphenhydrAMINE (BENADRYL) 50 MG capsule, Take 1 capsule by mouth Every 6 (Six) Hours As Needed for Itching., Disp: , Rfl:     omeprazole (priLOSEC) 40 MG capsule, Take 1 capsule by mouth Daily., Disp: , Rfl:     ondansetron ODT (ZOFRAN-ODT) 4 MG disintegrating tablet, 1 tablet., Disp: , Rfl:     potassium chloride (K-DUR) 10 MEQ CR tablet, Take 1 tablet by mouth Every Other Day. (Patient taking differently: Take 1 tablet by mouth 2 (Two) Times a Day.), Disp: , Rfl:     rosuvastatin (CRESTOR) 20 MG tablet, Take 2 tablets by mouth Daily., Disp: , Rfl:     sertraline (ZOLOFT) 50 MG tablet, Take 1 tablet by mouth Daily., Disp: , Rfl:     Vraylar 3 MG capsule capsule, Take  "1 capsule by mouth Daily., Disp: , Rfl:     zolpidem (AMBIEN) 10 MG tablet, Take 1 tablet by mouth At Night As Needed for Sleep., Disp: , Rfl:     carvedilol (COREG) 12.5 MG tablet, Take 1 tablet by mouth 2 (Two) Times a Day. (Patient taking differently: Take 0.5 tablets by mouth 2 (Two) Times a Day.), Disp: 60 tablet, Rfl: 11        History of Present Illness    Patient returns today for annual follow-up of coronary disease.  He is 5 years post CABG.  He has returned to work.  He has lost 15 pounds.  He thinks with activity he is actually feeling better this year than last.  No dyspnea chest pain shortness breath orthopnea PND or tachypalpitations.  No longer smokes.    The following portions of the patient's history were reviewed and updated as appropriate: allergies, current medications, past family history, past medical history, past social history, past surgical history and problem list.      Social History     Tobacco Use    Smoking status: Former     Current packs/day: 0.00     Average packs/day: 1 pack/day for 30.0 years (30.0 ttl pk-yrs)     Types: Cigarettes     Start date: 1990     Quit date: 2020     Years since quittin.2    Smokeless tobacco: Former     Types: Snuff     Quit date:    Substance Use Topics    Alcohol use: Not Currently     Comment: quit 2 months ago    Drug use: Defer         ROS       Objective:   /82   Pulse 56   Ht 172.7 cm (68\")   Wt 113 kg (248 lb 6.4 oz)   BMI 37.77 kg/m²         Vitals reviewed.   Constitutional:       Appearance: Well-developed and not in distress.   Neck:      Thyroid: No thyromegaly.      Vascular: No carotid bruit or JVD.   Pulmonary:      Breath sounds: Normal breath sounds.   Cardiovascular:      Regular rhythm.      No gallop. No S3 and S4 gallop.   Pulses:     Intact distal pulses.      Carotid: 2+ bilaterally.     Radial: 2+ bilaterally.  Edema:     Peripheral edema absent.   Abdominal:      General: Bowel sounds are normal.      " Palpations: Abdomen is soft. There is no abdominal mass.      Tenderness: There is no abdominal tenderness.   Musculoskeletal:         General: No deformity.      Extremities: No clubbing present.Skin:     General: Skin is warm and dry.      Findings: No rash.   Neurological:      Mental Status: Alert and oriented to person, place, and time.         Procedures          Assessment:   Assessment & Plan      Diagnoses and all orders for this visit:    1. Atherosclerosis of native coronary artery of native heart without angina pectoris (Primary)      1.  Coronary artery disease, 5 years post CABG.  Stable no angina.  Normal MPS.  2.  History of cardiomyopathy, resolved.  LVEF now normal  3.  Hypertension elevated today above, but previous readings have all been normal, including readings at primary care physician's office  4.  Dyslipidemia no recent lipid check available    Recommendations:  1.  Check fasting lipid profile through primary care physician's office  2.  Continue current medical therapy  3.  Revisit annually or as needed symptom change       Darinel Martinez MD          Dictated utilizing Dragon dictation

## (undated) DEVICE — SWAN-GANZ THERMODILUTION CATHETER: Brand: SWAN-GANZ

## (undated) DEVICE — SPNG GZ WOVN 4X4IN 12PLY 10/BX STRL

## (undated) DEVICE — GUIDE CATHETER: Brand: MACH1™

## (undated) DEVICE — PK CATH CARD 10

## (undated) DEVICE — SUT ETHIB 2/0 SH SH 36IN X523H

## (undated) DEVICE — ST INF PRI SMRTSTE 20DRP 2VLV 24ML 117

## (undated) DEVICE — GLIDESHEATH BASIC HYDROPHILIC COATED INTRODUCER SHEATH: Brand: GLIDESHEATH

## (undated) DEVICE — CATH DIAG EXPO .056 FL3.5 6F 100CM

## (undated) DEVICE — SOL NACL 0.9PCT 1000ML

## (undated) DEVICE — SOL NS 500ML

## (undated) DEVICE — CONNECTOR PH 6-IN-1 Y ST: Brand: CARDINAL HEALTH

## (undated) DEVICE — KT INTRO SHEATH PERC W/FULL DRP 9F

## (undated) DEVICE — BG BLD SYS

## (undated) DEVICE — GLV SURG SENSICARE MICRO PF LF 6.5 STRL

## (undated) DEVICE — CVR HNDL LT SURG ACCSSRY BLU STRL

## (undated) DEVICE — 2963 MEDIPORE SOFT CLOTH TAPE 3 IN X 10 YD 12 RLS/CS: Brand: 3M™ MEDIPORE™

## (undated) DEVICE — DEV COMP RAD PRELUDESYNC 24CM

## (undated) DEVICE — KT VLV HEMO MAP ACC PLS LG/BORE MTL/INTRO W/TORQ/DEV

## (undated) DEVICE — PRESSURE MONITORING SET: Brand: TRUWAVE

## (undated) DEVICE — PRESSURE MONITORING SET: Brand: TRUWAVE, VAMP

## (undated) DEVICE — PAD ARMBRD SURG CONVOL 7.5X20X2IN

## (undated) DEVICE — CATH DIAG EXPO M/ PK 6FR FL4/FR4 PIG 3PK

## (undated) DEVICE — PRESSURE MONITORING ACCESSORY: Brand: TRUWAVE

## (undated) DEVICE — SUT SILK 2/0 TIES 18IN A185H

## (undated) DEVICE — CLTH CLENS READYCLEANSE PERI CARE PK/5

## (undated) DEVICE — SUCTION CANISTER, 2500CC, RIGID: Brand: DEROYAL

## (undated) DEVICE — [HIGH FLOW INSUFFLATOR,  DO NOT USE IF PACKAGE IS DAMAGED,  KEEP DRY,  KEEP AWAY FROM SUNLIGHT,  PROTECT FROM HEAT AND RADIOACTIVE SOURCES.]: Brand: PNEUMOSURE

## (undated) DEVICE — Device: Brand: MEDEX

## (undated) DEVICE — MEDI-VAC NON-CONDUCTIVE SUCTION TUBING: Brand: CARDINAL HEALTH

## (undated) DEVICE — MODEL BT2000 P/N 700287-012KIT CONTENTS: MANIFOLD WITH SALINE AND CONTRAST PORTS, SALINE TUBING WITH SPIKE AND HAND SYRINGE, TRANSDUCER: Brand: BT2000 AUTOMATED MANIFOLD KIT

## (undated) DEVICE — SUT PROLN 7/0 BV1 D/A 24IN 8702H

## (undated) DEVICE — SUT SILK 4/0 TIES 18IN A183H

## (undated) DEVICE — DR ROGERS OH: Brand: MEDLINE INDUSTRIES, INC.

## (undated) DEVICE — SUT ETHIB 1 CTX CR8 18IN CX30D

## (undated) DEVICE — Device

## (undated) DEVICE — PK HEART OPN 10

## (undated) DEVICE — ELECTRD BLD EZ CLN STD 2.5IN

## (undated) DEVICE — ANTIBACTERIAL UNDYED BRAIDED (POLYGLACTIN 910), SYNTHETIC ABSORBABLE SUTURE: Brand: COATED VICRYL

## (undated) DEVICE — GLV SURG PREMIERPRO MIC LTX PF SZ7 BRN

## (undated) DEVICE — SUT SILK 0/0 CT2 18IN C027D

## (undated) DEVICE — GW PRESS VERRATA STR 185CM 10185P

## (undated) DEVICE — Device: Brand: LEVEL 1

## (undated) DEVICE — SUT PROLN 6/0 C1 D/A 30IN 8706H

## (undated) DEVICE — SUT PROLN 7/0 8747H

## (undated) DEVICE — SUT SILK 2/0 CT1 CR8 18IN C022D

## (undated) DEVICE — DRP SLUSH MACH

## (undated) DEVICE — SUT SILK B CARDIO BB 5/0 30IN K880H BX/36

## (undated) DEVICE — MODEL AT P65, P/N 701554-001KIT CONTENTS: HAND CONTROLLER, 3-WAY HIGH-PRESSURE STOPCOCK WITH ROTATING END AND PREMIUM HIGH-PRESSURE TUBING: Brand: ANGIOTOUCH® KIT

## (undated) DEVICE — SUT SILK 2 SUTUPAK TIE 60IN SA8H 2STRAND